# Patient Record
Sex: FEMALE | Race: WHITE | URBAN - METROPOLITAN AREA
[De-identification: names, ages, dates, MRNs, and addresses within clinical notes are randomized per-mention and may not be internally consistent; named-entity substitution may affect disease eponyms.]

---

## 2017-01-04 ENCOUNTER — TELEPHONE (OUTPATIENT)
Dept: NURSING | Facility: CLINIC | Age: 52
End: 2017-01-04

## 2017-01-04 DIAGNOSIS — E78.5 HYPERLIPIDEMIA LDL GOAL <100: Primary | ICD-10-CM

## 2017-01-04 RX ORDER — ATORVASTATIN CALCIUM 20 MG/1
20 TABLET, FILM COATED ORAL DAILY
Qty: 45 TABLET | Refills: 0 | Status: SHIPPED | OUTPATIENT
Start: 2017-01-04 | End: 2017-02-13

## 2017-01-04 NOTE — TELEPHONE ENCOUNTER
Called pt and LM informing Rx has been sent to her pharmacy and that it is for 20 mg of the Lipitor (Atorovastatin) so only needs to take 1 tablet, with 45 tabs/0rf (not two as she was doing with the 10 mg tablets) so she has enough so she doesn't run out before her appt. If has questions to call and speak to Triage. Closing encounter.

## 2017-01-04 NOTE — TELEPHONE ENCOUNTER
Pt calling, in November 2016 was changed from Zocor to Lipitor (Atorvastatin) and an Rx for 10 mg was sent but then after her results were back Violette recommending taking 20 mg instead, so pt was taking 2 tablets of the 10 mg and because of that she ran out of Rx about a wk ago and is just calling now. Called pharmacy and spoke to Pharmacist (Karina) who indicated it will get paid for (by insurance) on 1/18/17. Gave verbal to Karina to cancel/delete the remaining 3 month Rx and that we would send a new Rx. Last annual 1/13/16, pt stated she would make appt for February. Routing to Violette, ok to send 45 tabs of Atorvastatin 20 mg so pt would only take 1 tab daily? Routing to Violette. Rx & pharmacy pended.

## 2017-01-06 ENCOUNTER — TELEPHONE (OUTPATIENT)
Dept: PEDIATRICS | Facility: CLINIC | Age: 52
End: 2017-01-06

## 2017-01-06 NOTE — LETTER
[facility] [place]  [address]  [address]  [place] [address] [phone]        March 24, 2017  Modesta Bravo  [address]  [place] [address]    Dear Modesta,    I care about your health and have reviewed your health plan. I have reviewed your medical conditions, medication list, and lab results and am making recommendations based on this review, to better manage your health.    You are in particular need of attention regarding:  -Colon Cancer Screening    I am recommending that you:  -schedule a COLONOSCOPY to look for colon cancer (due every 10 years or 5 years in higher risk situations.)   Colon cancer is now the second leading cause of death in the [place] for both men and women and there are over 130,000 new cases and 50,000 deaths per year from colon cancer.  Colonoscopies can prevent 90-95% of these deaths.  Problem lesions can be removed before they ever become cancer.  This test is not only looking for cancer, but also getting rid of precancerious lesions.  If you do not wish to do a colonoscopy or cannot afford to do one, at this time, there is another option. It is called a FIT test or Fecal Immunochemical Occult Blood Test (take home stool sample kit).  It does not replace the colonoscopy for colorectal cancer screening, but it can detect hidden bleeding in the lower colon.  It does need to be repeated every year and if a positive result is obtained, you would be referred for a colonoscopy.  If you have completed either one of these tests at another facility, please have the records sent to our clinic so that we can best coordinate your care.    Here is a list of Health Maintenance topics that are due now or due soon:  Health Maintenance Due   Topic Date Due     HEPATITIS C SCREENING  11/09/1983     COLON CANCER SCREEN (SYSTEM ASSIGNED)  11/09/2015         Please call us at [phone] (or use [employer]) to address the above  recommendations.    Thank you for trusting [facility] and we appreciate the opportunity to serve you.  We look forward to supporting your healthcare needs in the future.    Healthy Regards,    Dr. Hugo Care Team

## 2017-01-06 NOTE — TELEPHONE ENCOUNTER
Panel Management Review      Patient has the following on her problem list: None      Composite cancer screening  Chart review shows that this patient is due/due soon for the following Colonoscopy  Summary:    Patient is due/failing the following:   COLONOSCOPY    Action needed:   Patient needs office visit for colonoscopy.    Type of outreach:    Sent [employer] message.    Questions for provider review:    None                                                                                                                                    Katiuska Blackwood MA       Chart routed to self .

## 2017-01-06 NOTE — LETTER
[facility]  [address]  [address]  [place] [address]  Phone: [phone]  Fax: [phone]  May 18, 2017      Modesta Bravo  [address] [place] [address]      Dear Modesta,    We care about your health and have reviewed your health plan including your medical conditions, medications, and lab results.  Based on this review, it is recommended that you follow up regarding the following health topic(s):  -Colon Cancer Screening    We recommend you take the following action(s):  -schedule a COLONOSCOPY to look for colon cancer (due every 10 years or 5 years in higher risk situations.)  Colonoscopies can prevent 90-95% of colon cancer deaths.  Problem lesions can be removed before they ever become cancer.  If you do not wish to do a colonoscopy or cannot afford to do one at this time, there is another option called a Fecal Immunochemical Occult Blood Test (FIT) a take home stool sample kit.  It does not replace the colonoscopy for colorectal cancer screening, but it can detect hidden bleeding in the lower colon.  It does need to be repeated every year and if a positive result is obtained, you would be referred for a colonoscopy.  If you have completed either one of these tests at another facility, please have the records sent to our clinic for our records.     Please call us at the [facility] [phone] (or use [employer]) to address the above recommendations.     Thank you for trusting [facility] and we appreciate the opportunity to serve you.  We look forward to supporting your healthcare needs in the future.    Healthy Regards,    Your Health Care Team  [facility]

## 2017-02-13 ENCOUNTER — RADIANT APPOINTMENT (OUTPATIENT)
Dept: MAMMOGRAPHY | Facility: CLINIC | Age: 52
End: 2017-02-13
Payer: COMMERCIAL

## 2017-02-13 ENCOUNTER — OFFICE VISIT (OUTPATIENT)
Dept: OBGYN | Facility: CLINIC | Age: 52
End: 2017-02-13
Payer: COMMERCIAL

## 2017-02-13 VITALS
SYSTOLIC BLOOD PRESSURE: 116 MMHG | BODY MASS INDEX: 32.8 KG/M2 | DIASTOLIC BLOOD PRESSURE: 72 MMHG | WEIGHT: 209 LBS | HEIGHT: 67 IN | HEART RATE: 70 BPM

## 2017-02-13 DIAGNOSIS — E78.5 HYPERLIPIDEMIA, UNSPECIFIED HYPERLIPIDEMIA TYPE: ICD-10-CM

## 2017-02-13 DIAGNOSIS — I10 ESSENTIAL HYPERTENSION WITH GOAL BLOOD PRESSURE LESS THAN 140/90: ICD-10-CM

## 2017-02-13 DIAGNOSIS — Z01.419 ENCOUNTER FOR GYNECOLOGICAL EXAMINATION WITHOUT ABNORMAL FINDING: Primary | ICD-10-CM

## 2017-02-13 DIAGNOSIS — Z12.31 VISIT FOR SCREENING MAMMOGRAM: ICD-10-CM

## 2017-02-13 DIAGNOSIS — Z12.11 COLON CANCER SCREENING: ICD-10-CM

## 2017-02-13 DIAGNOSIS — F41.9 ANXIETY: ICD-10-CM

## 2017-02-13 DIAGNOSIS — E78.5 HYPERLIPIDEMIA LDL GOAL <100: ICD-10-CM

## 2017-02-13 LAB
ALBUMIN SERPL-MCNC: 4 G/DL (ref 3.4–5)
ALP SERPL-CCNC: 66 U/L (ref 40–150)
ALT SERPL W P-5'-P-CCNC: 72 U/L (ref 0–50)
ANION GAP SERPL CALCULATED.3IONS-SCNC: 9 MMOL/L (ref 3–14)
AST SERPL W P-5'-P-CCNC: 42 U/L (ref 0–45)
BILIRUB SERPL-MCNC: 0.5 MG/DL (ref 0.2–1.3)
BUN SERPL-MCNC: 12 MG/DL (ref 7–30)
CALCIUM SERPL-MCNC: 9.3 MG/DL (ref 8.5–10.1)
CHLORIDE SERPL-SCNC: 102 MMOL/L (ref 94–109)
CHOLEST SERPL-MCNC: 155 MG/DL
CO2 SERPL-SCNC: 26 MMOL/L (ref 20–32)
CREAT SERPL-MCNC: 0.8 MG/DL (ref 0.52–1.04)
GFR SERPL CREATININE-BSD FRML MDRD: 76 ML/MIN/1.7M2
GLUCOSE SERPL-MCNC: 112 MG/DL (ref 70–99)
HDLC SERPL-MCNC: 45 MG/DL
LDLC SERPL CALC-MCNC: 82 MG/DL
NONHDLC SERPL-MCNC: 110 MG/DL
POTASSIUM SERPL-SCNC: 3.4 MMOL/L (ref 3.4–5.3)
PROT SERPL-MCNC: 7.9 G/DL (ref 6.8–8.8)
SODIUM SERPL-SCNC: 137 MMOL/L (ref 133–144)
TRIGL SERPL-MCNC: 142 MG/DL

## 2017-02-13 PROCEDURE — 87624 HPV HI-RISK TYP POOLED RSLT: CPT | Performed by: PHYSICIAN ASSISTANT

## 2017-02-13 PROCEDURE — 80053 COMPREHEN METABOLIC PANEL: CPT | Performed by: PHYSICIAN ASSISTANT

## 2017-02-13 PROCEDURE — G0202 SCR MAMMO BI INCL CAD: HCPCS | Mod: TC

## 2017-02-13 PROCEDURE — G0145 SCR C/V CYTO,THINLAYER,RESCR: HCPCS | Performed by: PHYSICIAN ASSISTANT

## 2017-02-13 PROCEDURE — 36415 COLL VENOUS BLD VENIPUNCTURE: CPT | Performed by: PHYSICIAN ASSISTANT

## 2017-02-13 PROCEDURE — 99396 PREV VISIT EST AGE 40-64: CPT | Performed by: PHYSICIAN ASSISTANT

## 2017-02-13 PROCEDURE — 80061 LIPID PANEL: CPT | Performed by: PHYSICIAN ASSISTANT

## 2017-02-13 RX ORDER — ATORVASTATIN CALCIUM 20 MG/1
20 TABLET, FILM COATED ORAL DAILY
Qty: 90 TABLET | Refills: 3 | Status: SHIPPED | OUTPATIENT
Start: 2017-02-13 | End: 2018-02-09

## 2017-02-13 RX ORDER — LISINOPRIL AND HYDROCHLOROTHIAZIDE 20; 25 MG/1; MG/1
1 TABLET ORAL DAILY
Qty: 90 TABLET | Refills: 3 | Status: CANCELLED | OUTPATIENT
Start: 2017-02-13

## 2017-02-13 ASSESSMENT — ANXIETY QUESTIONNAIRES
5. BEING SO RESTLESS THAT IT IS HARD TO SIT STILL: SEVERAL DAYS
1. FEELING NERVOUS, ANXIOUS, OR ON EDGE: SEVERAL DAYS
3. WORRYING TOO MUCH ABOUT DIFFERENT THINGS: SEVERAL DAYS
6. BECOMING EASILY ANNOYED OR IRRITABLE: SEVERAL DAYS
GAD7 TOTAL SCORE: 6
7. FEELING AFRAID AS IF SOMETHING AWFUL MIGHT HAPPEN: NOT AT ALL
2. NOT BEING ABLE TO STOP OR CONTROL WORRYING: SEVERAL DAYS
IF YOU CHECKED OFF ANY PROBLEMS ON THIS QUESTIONNAIRE, HOW DIFFICULT HAVE THESE PROBLEMS MADE IT FOR YOU TO DO YOUR WORK, TAKE CARE OF THINGS AT HOME, OR GET ALONG WITH OTHER PEOPLE: NOT DIFFICULT AT ALL

## 2017-02-13 ASSESSMENT — PATIENT HEALTH QUESTIONNAIRE - PHQ9: 5. POOR APPETITE OR OVEREATING: SEVERAL DAYS

## 2017-02-13 NOTE — PROGRESS NOTES
Modesta is a 51 year old  female who presents for annual exam.     Besides routine health maintenance, she has no other health concerns today .    HPI:  The patient's PCP is Toro Hugo MD, MD.      Doing well overall. Main concern is still can't loose weight. Has decided to see a  at the [facility] as next step.     Still having monthly menses and they remain very heavy. We have discussed ablation in the past which she still may consider.   This past month, her period was lighter, however so she is going to see if this is the new trend.   She is having occ hot flushes but not bad. +vaginal dryness, ok with OTC lube, decreased libido.    Would like refill of sertraline. Moods are ok overall, still feels more irritable than use to but is ok with same dose of medication.   Lipitor is going much better than simvastatin, takes 20mg daily. LFTs were up last year prior to the med change so will recheck today.   No muscle aches or fatigue with lipitor.   Blood pressure has been excellent, wonders about a decreased dose. No dizziness.     Being worked up for chronic congestion and some coughing, occ wheezing. Pulmonary function testing was normal. Thinking it may be allergies.     Had mammogram prior to visit.   Agrees to colonoscopy at [facility], did not get it done last year but agrees to do it now.   Pap/HPV due today.       GYNECOLOGIC HISTORY:    Patient's last menstrual period was 2017 (approximate).  Her current contraception method is: vasectomy.  She  reports that she has never smoked. She has never used smokeless tobacco.    Patient is sexually active.  STD testing offered?  Declined  Last PHQ-9 score on record =   PHQ-9 SCORE 2017   Total Score 5     Last GAD7 score on record =   SARAI-7 SCORE 2017   Total Score 6     Alcohol Score = 3    HEALTH MAINTENANCE:  Cholesterol: 16   Total= 239, Triglycerides=158, HDL=51, PSR=548  Last Mammo: one year ago, Result: normal, Next  Mammo: today   Pap: 12 wnl, HPV-  Colonoscopy:  Never, Result: not applicable, Next Colonoscopy: DUE  Dexa:  Never    Health maintenance updated:  yes    HISTORY:  Obstetric History       T2      TAB0   SAB0   E0   M0   L2       # Outcome Date GA Lbr Bandar/2nd Weight Sex Delivery Anes PTL Lv   2 Term            1 Term                   Patient Active Problem List   Diagnosis     Hyperlipidemia     Hyperparathyroidism (H)     Hypertension goal BP (blood pressure) < 140/90     Anxiety     Cough     Past Surgical History   Procedure Laterality Date     No history of surgery        Social History   Substance Use Topics     Smoking status: Never Smoker     Smokeless tobacco: Never Used     Alcohol use 0.0 oz/week     0 Standard drinks or equivalent per week      Problem (# of Occurrences) Relation (Name,Age of Onset)    Breast Cancer (3) Sister (47): pt is BRCA neg, Maternal Grandmother, Sister    DIABETES (2) Mother, Father    Hyperlipidemia (2) Mother, Father    Hypertension (2) Mother, Father: chf 73            Current Outpatient Prescriptions   Medication Sig     BABY ASPIRIN PO      atorvastatin (LIPITOR) 20 MG tablet Take 1 tablet (20 mg) by mouth daily     sertraline (ZOLOFT) 50 MG tablet Take 1 tablet (50 mg) by mouth daily     lisinopril-hydrochlorothiazide (PRINZIDE,ZESTORETIC) 20-25 MG per tablet Take 1 tablet by mouth daily     albuterol (PROAIR HFA, PROVENTIL HFA, VENTOLIN HFA) 108 (90 BASE) MCG/ACT inhaler Inhale 2 puffs into the lungs every 6 hours as needed for shortness of breath / dyspnea or wheezing     Vitamin D, Cholecalciferol, 1000 UNITS TABS Take 2,000 Units by mouth daily     [DISCONTINUED] atorvastatin (LIPITOR) 20 MG tablet Take 1 tablet (20 mg) by mouth daily     beclomethasone (QVAR) 80 MCG/ACT Inhaler Inhale 2 puffs into the lungs 2 times daily (Patient not taking: Reported on 2017)     [DISCONTINUED] sertraline (ZOLOFT) 50 MG tablet Take 1 tablet (50 mg) by mouth  "daily     No current facility-administered medications for this visit.      No Known Allergies    Past medical, surgical, social and family histories were reviewed and updated in EPIC.    ROS:   12 point review of systems negative other than symptoms noted below.  Constitutional: Fatigue and Weight Gain  Head: Nasal Congestion  Cardiovascular: Chest Pain  Respiratory: Cough and Wheezing  Genitourinary: Cramps, Heavy Bleeding with Period, Hot Flashes, Night Sweats, Painful [place] and Vaginal Dryness  Skin: Skin Dryness  Neurologic: Headaches  Endocrine: Decreased Libido  Psychiatric: Anxiety, Difficulty Sleeping and [place]    EXAM:  /72  Pulse 70  Ht 5' 6.5\" (1.689 m)  Wt 209 lb (94.8 kg)  LMP 01/26/2017 (Approximate)  BMI 33.23 kg/m2   BMI: Body mass index is 33.23 kg/(m^2).    PHYSICAL EXAM:  Constitutional:  Appearance: Well nourished, well developed, alert, in no acute distress  Neck:  Lymph Nodes:  No lymphadenopathy present    Thyroid:  Gland size normal, nontender, no nodules or masses present  on palpation  Chest:  Respiratory Effort:  Breathing unlabored  Cardiovascular:    Heart: Auscultation:  Regular rate, normal rhythm, no murmurs present  Breasts: Inspection of Breasts:  No lymphadenopathy present    Palpation of Breasts and Axillae:  No masses present on palpation, no  breast tenderness    Axillary Lymph Nodes:  No lymphadenopathy present  Gastrointestinal:   Abdominal Examination:  Abdomen nontender to palpation, tone normal without rigidity or guarding, no masses present, umbilicus without lesions   Liver and Spleen:  No hepatomegaly present, liver nontender to palpation    Hernias:  No hernias present  Lymphatic: Lymph Nodes:  No other lymphadenopathy present  Skin:  General Inspection:  No rashes present, no lesions present, no areas of  discoloration    Genitalia and Groin:  No rashes present, no lesions present, no areas of  discoloration, no masses " present  Neurologic/Psychiatric:    Mental Status:  Oriented X3     Pelvic Exam:  External Genitalia:     Normal appearance for age, no discharge present, no tenderness present, no inflammatory lesions present, color normal  Vagina:     Normal vaginal vault without central or paravaginal defects, ATROPHIC  Bladder:     Nontender to palpation  Urethra:   Urethral Body:  Urethra palpation normal, urethra structural support normal   Urethral Meatus:  No erythema or lesions present  Cervix:     Appearance healthy, no lesions present, nontender to palpation, no bleeding present  Uterus:     Nontender to palpation, no masses present, position anteflexed, mobility: normal  Adnexa:     No adnexal tenderness present, no adnexal masses present  Perineum:     Perineum within normal limits, no evidence of trauma, no rashes or skin lesions present  Inguinal Lymph Nodes:     No lymphadenopathy present      COUNSELING:   Reviewed preventive health counseling, as reflected in patient instructions  Special attention given to:        (Marie)menopause management    BMI: Body mass index is 33.23 kg/(m^2).  Weight management plan: Discussed healthy diet and exercise guidelines and patient will follow up in 12 months in clinic to re-evaluate.    ASSESSMENT:  51 year old female with satisfactory annual exam.    ICD-10-CM    1. Encounter for gynecological examination without abnormal finding Z01.419 Pap imaged thin layer screen with HPV - recommended age 30 - 65     HPV High Risk Types DNA Cervical   2. Hyperlipidemia LDL goal <100 E78.5 Comprehensive metabolic panel     Lipid panel reflex to direct LDL     atorvastatin (LIPITOR) 20 MG tablet   3. Anxiety F41.9 sertraline (ZOLOFT) 50 MG tablet   4. Essential hypertension with goal blood pressure less than 140/90 I10    5. Colon cancer screening Z12.11 GASTROENTEROLOGY ADULT REF PROCEDURE ONLY   6. Hypertension goal BP (blood pressure) < 140/90 I10    7. Hyperlipidemia, unspecified  hyperlipidemia type E78.5        PLAN:  Follow up with your primary care provider for your other medical problems.  Risks, benefits, and side effects of medications reviewed.  Increase physical activity and exercise.  PHQ-9/SARAI-7 scores were discussed.  Alcohol score discussed.  Lab results will be called to the patient.  Usual safety and preventative measures counseling done.  BMI >25  Weight loss encouraged.  Discussed Osteoporosis screening as well as calcium and Vitamin D recommendations.  Pt will my chart message me with 2 weeks of blood pressure results after her trip to [place]. If they remain in the 120/70s range, recommend she continue with current dose of BP medication. If the are lower and/or feels dizzy we will decrease dose.   Agrees to schedule colonoscopy, orders sent.   Continue with sertraline and lipitor at current doses.   Discussed menses and vania-menopause. She will RTC for ablation consult if her menses continue to be heavy. Not interested in Mirena.     Violette Fletcher PA-C

## 2017-02-13 NOTE — MR AVS SNAPSHOT
After Visit Summary   2/13/2017    Modesta Bravo    MRN: [id number]           Patient Information     Date Of Birth          1965        Visit Information        Provider Department      2/13/2017 9:00 AM Violette Fletcher PA-C [facility]        Today's Diagnoses     Encounter for gynecological examination without abnormal finding    -  1    Hyperlipidemia LDL goal <100        Anxiety        Essential hypertension with goal blood pressure less than 140/90        Colon cancer screening        Hypertension goal BP (blood pressure) < 140/90        Hyperlipidemia, unspecified hyperlipidemia type           Follow-ups after your visit        Additional Services     GASTROENTEROLOGY ADULT REF PROCEDURE ONLY       Last Lab Result: Creatinine (mg/dL)       Date                     Value                 11/11/2016               0.75             ----------  Body mass index is 33.23 kg/(m^2).     Needed:  No  Language:  English    Patient will be contacted to schedule procedure.     Please be aware that coverage of these services is subject to the terms and limitations of your health insurance plan.  Call member services at your health plan with any benefit or coverage questions.  Any procedures must be performed at a [place] facility OR coordinated by your clinic's referral office.    Please bring the following with you to your appointment:    (1) Any X-Rays, CTs or MRIs which have been performed.  Contact the facility where they were done to arrange for  prior to your scheduled appointment.    (2) List of current medications   (3) This referral request   (4) Any documents/labs given to you for this referral                  Who to contact     If you have questions or need follow up information about today's clinic visit or your schedule please contact [facility] directly at [phone].  Normal or non-critical lab and imaging results  "will be communicated to you by MyChart, letter or phone within 4 business days after the clinic has received the results. If you do not hear from us within 7 days, please contact the clinic through [employer] or phone. If you have a critical or abnormal lab result, we will notify you by phone as soon as possible.  Submit refill requests through [employer] or call your pharmacy and they will forward the refill request to us. Please allow 3 business days for your refill to be completed.          Additional Information About Your Visit        [employer] Information     [employer] gives you secure access to your electronic health record. If you see a primary care provider, you can also send messages to your care team and make appointments. If you have questions, please call your primary care clinic.  If you do not have a primary care provider, please call [phone] and they will assist you.        Care EveryWhere ID     This is your Care EveryWhere ID. This could be used by other organizations to access your [facility] medical records  [id number]        Your Vitals Were     Pulse Height Last Period BMI (Body Mass Index)          70 5' 6.5\" (1.689 m) 01/26/2017 (Approximate) 33.23 kg/m2         Blood Pressure from Last 3 Encounters:   02/13/17 116/72   11/11/16 (!) 150/92   08/29/16 (!) 152/103    Weight from Last 3 Encounters:   02/13/17 209 lb (94.8 kg)   11/11/16 209 lb (94.8 kg)   08/29/16 207 lb (93.9 kg)              We Performed the Following     Comprehensive metabolic panel     GASTROENTEROLOGY ADULT REF PROCEDURE ONLY     HPV High Risk Types DNA Cervical     Lipid panel reflex to direct LDL     Pap imaged thin layer screen with HPV - recommended age 30 - 65          Where to get your medicines      These medications were sent to [id number] - [place] - [address]  [address] [place] [address]     Phone:  [phone]     atorvastatin 20 MG tablet    " sertraline 50 MG tablet          Primary Care Provider Office Phone # Fax #    Toro Hugo [phone]       [facility] [place] [address]        Thank you!     Thank you for choosing [facility]  for your care. Our goal is always to provide you with excellent care. Hearing back from our patients is one way we can continue to improve our services. Please take a few minutes to complete the written survey that you may receive in the mail after your visit with us. Thank you!             Your Updated Medication List - Protect others around you: Learn how to safely use, store and throw away your medicines at www.disposemymeds.org.          This list is accurate as of: 2/13/17  9:58 AM.  Always use your most recent med list.                   Brand Name Dispense Instructions for use    albuterol 108 (90 BASE) MCG/ACT Inhaler    PROAIR HFA/PROVENTIL HFA/VENTOLIN HFA    3 Inhaler    Inhale 2 puffs into the lungs every 6 hours as needed for shortness of breath / dyspnea or wheezing       atorvastatin 20 MG tablet    LIPITOR    90 tablet    Take 1 tablet (20 mg) by mouth daily       BABY ASPIRIN PO          beclomethasone 80 MCG/ACT Inhaler    QVAR    3 Inhaler    Inhale 2 puffs into the lungs 2 times daily       lisinopril-hydrochlorothiazide 20-25 MG per tablet    PRINZIDE/ZESTORETIC    90 tablet    Take 1 tablet by mouth daily       sertraline 50 MG tablet    ZOLOFT    90 tablet    Take 1 tablet (50 mg) by mouth daily       Vitamin D (Cholecalciferol) 1000 UNITS Tabs      Take 2,000 Units by mouth daily

## 2017-02-14 ASSESSMENT — ANXIETY QUESTIONNAIRES: GAD7 TOTAL SCORE: 6

## 2017-02-14 ASSESSMENT — PATIENT HEALTH QUESTIONNAIRE - PHQ9: SUM OF ALL RESPONSES TO PHQ QUESTIONS 1-9: 5

## 2017-02-15 LAB
COPATH REPORT: NORMAL
PAP: NORMAL

## 2017-02-17 LAB
FINAL DIAGNOSIS: NORMAL
HPV HR 12 DNA CVX QL NAA+PROBE: NEGATIVE
HPV16 DNA SPEC QL NAA+PROBE: NEGATIVE
HPV18 DNA SPEC QL NAA+PROBE: NEGATIVE
SPECIMEN DESCRIPTION: NORMAL

## 2017-03-01 DIAGNOSIS — R05.9 COUGH: ICD-10-CM

## 2017-03-02 RX ORDER — BENZONATATE 100 MG/1
CAPSULE ORAL
Qty: 42 CAPSULE | Refills: 0 | OUTPATIENT
Start: 2017-03-02

## 2017-04-21 ENCOUNTER — OFFICE VISIT (OUTPATIENT)
Dept: URGENT CARE | Facility: URGENT CARE | Age: 52
End: 2017-04-21
Payer: COMMERCIAL

## 2017-04-21 VITALS
OXYGEN SATURATION: 96 % | SYSTOLIC BLOOD PRESSURE: 120 MMHG | WEIGHT: 213 LBS | TEMPERATURE: 99.1 F | RESPIRATION RATE: 22 BRPM | BODY MASS INDEX: 33.86 KG/M2 | HEART RATE: 90 BPM | DIASTOLIC BLOOD PRESSURE: 80 MMHG

## 2017-04-21 DIAGNOSIS — R05.9 COUGH: ICD-10-CM

## 2017-04-21 DIAGNOSIS — J45.901 ASTHMA EXACERBATION: Primary | ICD-10-CM

## 2017-04-21 PROCEDURE — 94640 AIRWAY INHALATION TREATMENT: CPT | Performed by: FAMILY MEDICINE

## 2017-04-21 PROCEDURE — 99214 OFFICE O/P EST MOD 30 MIN: CPT | Mod: 25 | Performed by: FAMILY MEDICINE

## 2017-04-21 RX ORDER — ALBUTEROL SULFATE 90 UG/1
2 AEROSOL, METERED RESPIRATORY (INHALATION) EVERY 4 HOURS PRN
Qty: 1 INHALER | Refills: 1 | Status: SHIPPED | OUTPATIENT
Start: 2017-04-21 | End: 2018-03-27

## 2017-04-21 RX ORDER — ALBUTEROL SULFATE 0.83 MG/ML
1 SOLUTION RESPIRATORY (INHALATION) EVERY 4 HOURS PRN
Status: ON HOLD | COMMUNITY
Start: 2017-04-21 | End: 2017-07-28

## 2017-04-21 RX ORDER — BENZONATATE 100 MG/1
200 CAPSULE ORAL 3 TIMES DAILY PRN
Qty: 42 CAPSULE | Refills: 3 | Status: SHIPPED | OUTPATIENT
Start: 2017-04-21 | End: 2017-05-10

## 2017-04-21 NOTE — PROGRESS NOTES
SUBJECTIVE:   Modesta Bravo is a 51 year old female with a h/o cough variant asthma.  She is presenting with a chief complaint of cough (sometimes productive frothy phlegm, worse at night), wheezing.  No fevers.    Onset of symptoms was one week ago.  Course of illness is worsening. .    Severity moderate. .    Current and Associated symptoms: as listed above.   Treatment measures tried include Albuterol MDI (BID-TID). And Tessalon Perles.   Predisposing factors include h/o cough-variant asthma. .    Past Medical History:   Diagnosis Date     Anxiety      Cough variant asthma      Family history of breast cancer     Pt is BrCa negative     Hyperlipidaemia      Hyperparathyroidism (H)     seen by Dr. Bosch     Hypertension      Menorrhagia      Vitamin deficiency      Current Outpatient Prescriptions   Medication Sig Dispense Refill     BABY ASPIRIN PO        atorvastatin (LIPITOR) 20 MG tablet Take 1 tablet (20 mg) by mouth daily 90 tablet 3     sertraline (ZOLOFT) 50 MG tablet Take 1 tablet (50 mg) by mouth daily 90 tablet 3     lisinopril-hydrochlorothiazide (PRINZIDE,ZESTORETIC) 20-25 MG per tablet Take 1 tablet by mouth daily 90 tablet 3     albuterol (PROAIR HFA, PROVENTIL HFA, VENTOLIN HFA) 108 (90 BASE) MCG/ACT inhaler Inhale 2 puffs into the lungs every 6 hours as needed for shortness of breath / dyspnea or wheezing 3 Inhaler 1     beclomethasone (QVAR) 80 MCG/ACT Inhaler Inhale 2 puffs into the lungs 2 times daily 3 Inhaler 3     Vitamin D, Cholecalciferol, 1000 UNITS TABS Take 2,000 Units by mouth daily       Social History   Substance Use Topics     Smoking status: Never Smoker     Smokeless tobacco: Never Used     Alcohol use 0.0 oz/week     0 Standard drinks or equivalent per week       ROS:  Negative except for above history of present illness.      OBJECTIVE  :/80 (BP Location: Right arm, Patient Position: Chair, Cuff Size: Adult Large)  Pulse 90  Temp 99.1  F (37.3  C) (Tympanic)  Resp  22  Wt 213 lb (96.6 kg)  SpO2 96%  BMI 33.86 kg/m2  GENERAL APPEARANCE: healthy, alert and audible wheezing.  Patient can speak in full sentences.    RESP: expiratory wheezes bilateral and throughout and inspiratory wheezes bilateral and throughout  CV: regular rates and rhythm, normal S1 S2, no murmur noted    ASSESSMENT:  Cough  Asthma exacerbation    PLAN:  Patient doesn't want a Rx for Prednisone.  In the past, patient gained 20 pounds after taking prednisone and has not been able to lose this weight.   Rx:  ProAir, Tessalon Perles  follow up with the primary care provider if not better in 3-4 days.   Go to the emergency room if you develop worsening shortness of breath.   Patient received an Albuterol neb treatment during this clinic encounter.  She felt better after the albuterol neb treatment.      See orders in Epic    Mayank Wan MD

## 2017-04-21 NOTE — MR AVS SNAPSHOT
After Visit Summary   4/21/2017    Modesta Bravo    MRN: [id number]           Patient Information     Date Of Birth          1965        Visit Information        Provider Department      4/21/2017 2:50 PM Mayank Wan MD [facility] [place] [facility]        Today's Diagnoses     Asthma exacerbation    -  1    Cough          Care Instructions    Go to the emergency room if there is worsening shortness of breath.     follow up with the primary care provider if not better in 3-4 days.         Follow-ups after your visit        Who to contact     If you have questions or need follow up information about today's clinic visit or your schedule please contact [facility] directly at [phone].  Normal or non-critical lab and imaging results will be communicated to you by quitchenhart, letter or phone within 4 business days after the clinic has received the results. If you do not hear from us within 7 days, please contact the clinic through e27t or phone. If you have a critical or abnormal lab result, we will notify you by phone as soon as possible.  Submit refill requests through [employer] or call your pharmacy and they will forward the refill request to us. Please allow 3 business days for your refill to be completed.          Additional Information About Your Visit        MyChart Information     [employer] gives you secure access to your electronic health record. If you see a primary care provider, you can also send messages to your care team and make appointments. If you have questions, please call your primary care clinic.  If you do not have a primary care provider, please call [phone] and they will assist you.        Care EveryWhere ID     This is your Care EveryWhere ID. This could be used by other organizations to access your [place] medical records  [id number]        Your Vitals Were     Pulse Temperature Respirations Pulse Oximetry BMI (Body Mass Index)       90 99.1  F (37.3   C) (Tympanic) 22 96% 33.86 kg/m2        Blood Pressure from Last 3 Encounters:   04/21/17 120/80   02/13/17 116/72   11/11/16 (!) 150/92    Weight from Last 3 Encounters:   04/21/17 213 lb (96.6 kg)   02/13/17 209 lb (94.8 kg)   11/11/16 209 lb (94.8 kg)              We Performed the Following     INHALATION/NEBULIZER TREATMENT, INITIAL          Today's Medication Changes          These changes are accurate as of: 4/21/17  4:24 PM.  If you have any questions, ask your nurse or doctor.               Start taking these medicines.        Dose/Directions    benzonatate 100 MG capsule   Commonly known as:  TESSALON   Used for:  Cough   Started by:  Mayank Wan MD        Dose:  200 mg   Take 2 capsules (200 mg) by mouth 3 times daily as needed   Quantity:  42 capsule   Refills:  3         These medicines have changed or have updated prescriptions.        Dose/Directions    * albuterol 108 (90 BASE) MCG/ACT Inhaler   Commonly known as:  PROAIR HFA/PROVENTIL HFA/VENTOLIN HFA   This may have changed:  Another medication with the same name was added. Make sure you understand how and when to take each.   Used for:  Acute bronchospasm   Changed by:  Moses Sanders MD        Dose:  2 puff   Inhale 2 puffs into the lungs every 6 hours as needed for shortness of breath / dyspnea or wheezing   Quantity:  3 Inhaler   Refills:  1       * albuterol 108 (90 BASE) MCG/ACT Inhaler   Commonly known as:  PROAIR HFA/PROVENTIL HFA/VENTOLIN HFA   This may have changed:  You were already taking a medication with the same name, and this prescription was added. Make sure you understand how and when to take each.   Used for:  Asthma exacerbation, Cough   Changed by:  Mayank Wan MD        Dose:  2 puff   Inhale 2 puffs into the lungs every 4 hours as needed for shortness of breath / dyspnea or wheezing /chest tightness/cough   Quantity:  1 Inhaler   Refills:  1       * albuterol (2.5 MG/3ML) 0.083% neb solution   This may have  changed:  Another medication with the same name was added. Make sure you understand how and when to take each.   Used for:  Asthma exacerbation   Changed by:  Mayank Wan MD        Dose:  1 vial   Take 1 vial (2.5 mg) by nebulization once for 1 dose   Refills:  0       * Notice:  This list has 3 medication(s) that are the same as other medications prescribed for you. Read the directions carefully, and ask your doctor or other care provider to review them with you.         Where to get your medicines      Some of these will need a paper prescription and others can be bought over the counter.  Ask your nurse if you have questions.     Bring a paper prescription for each of these medications     albuterol 108 (90 BASE) MCG/ACT Inhaler    benzonatate 100 MG capsule                Primary Care Provider Office Phone # Fax #    Toro Hugo [phone]       [place] [address] [place] [address]        Thank you!     Thank you for choosing [facility]  for your care. Our goal is always to provide you with excellent care. Hearing back from our patients is one way we can continue to improve our services. Please take a few minutes to complete the written survey that you may receive in the mail after your visit with us. Thank you!             Your Updated Medication List - Protect others around you: Learn how to safely use, store and throw away your medicines at www.disposemymeds.org.          This list is accurate as of: 4/21/17  4:24 PM.  Always use your most recent med list.                   Brand Name Dispense Instructions for use    * albuterol 108 (90 BASE) MCG/ACT Inhaler    PROAIR HFA/PROVENTIL HFA/VENTOLIN HFA    3 Inhaler    Inhale 2 puffs into the lungs every 6 hours as needed for shortness of breath / dyspnea or wheezing       * albuterol 108 (90 BASE) MCG/ACT Inhaler    PROAIR HFA/PROVENTIL HFA/VENTOLIN HFA    1 Inhaler    Inhale 2 puffs into  the lungs every 4 hours as needed for shortness of breath / dyspnea or wheezing /chest tightness/cough       * albuterol (2.5 MG/3ML) 0.083% neb solution      Take 1 vial (2.5 mg) by nebulization once for 1 dose       atorvastatin 20 MG tablet    LIPITOR    90 tablet    Take 1 tablet (20 mg) by mouth daily       BABY ASPIRIN PO          beclomethasone 80 MCG/ACT Inhaler    QVAR    3 Inhaler    Inhale 2 puffs into the lungs 2 times daily       benzonatate 100 MG capsule    TESSALON    42 capsule    Take 2 capsules (200 mg) by mouth 3 times daily as needed       lisinopril-hydrochlorothiazide 20-25 MG per tablet    PRINZIDE/ZESTORETIC    90 tablet    Take 1 tablet by mouth daily       sertraline 50 MG tablet    ZOLOFT    90 tablet    Take 1 tablet (50 mg) by mouth daily       Vitamin D (Cholecalciferol) 1000 UNITS Tabs      Take 2,000 Units by mouth daily       * Notice:  This list has 3 medication(s) that are the same as other medications prescribed for you. Read the directions carefully, and ask your doctor or other care provider to review them with you.

## 2017-04-21 NOTE — PATIENT INSTRUCTIONS
Go to the emergency room if there is worsening shortness of breath.     follow up with the primary care provider if not better in 3-4 days.

## 2017-05-10 ENCOUNTER — TELEPHONE (OUTPATIENT)
Dept: URGENT CARE | Facility: URGENT CARE | Age: 52
End: 2017-05-10

## 2017-05-10 DIAGNOSIS — R05.9 COUGH: ICD-10-CM

## 2017-05-10 RX ORDER — BENZONATATE 100 MG/1
200 CAPSULE ORAL 3 TIMES DAILY PRN
Qty: 42 CAPSULE | Refills: 3 | Status: SHIPPED | OUTPATIENT
Start: 2017-05-10 | End: 2017-08-03

## 2017-05-10 NOTE — TELEPHONE ENCOUNTER
Clinic Action Needed:None  Reason for Call:Patient calling requesting Tessalon prescription be sent to [facility]. Reporting she had lost original hand written Rx.  Reordered to [facility].  Routed to:Not Routed    Shonna Chung, RN  [facility]

## 2017-07-07 ENCOUNTER — OFFICE VISIT (OUTPATIENT)
Dept: OBGYN | Facility: CLINIC | Age: 52
End: 2017-07-07
Payer: COMMERCIAL

## 2017-07-07 ENCOUNTER — TELEPHONE (OUTPATIENT)
Dept: OBGYN | Facility: CLINIC | Age: 52
End: 2017-07-07

## 2017-07-07 ENCOUNTER — RADIANT APPOINTMENT (OUTPATIENT)
Dept: ULTRASOUND IMAGING | Facility: CLINIC | Age: 52
End: 2017-07-07
Payer: COMMERCIAL

## 2017-07-07 VITALS — DIASTOLIC BLOOD PRESSURE: 70 MMHG | SYSTOLIC BLOOD PRESSURE: 118 MMHG

## 2017-07-07 DIAGNOSIS — N92.0 MENORRHAGIA WITH REGULAR CYCLE: Primary | ICD-10-CM

## 2017-07-07 DIAGNOSIS — N92.0 MENORRHAGIA WITH REGULAR CYCLE: ICD-10-CM

## 2017-07-07 DIAGNOSIS — F41.9 ANXIETY: ICD-10-CM

## 2017-07-07 DIAGNOSIS — R37 SEXUAL DYSFUNCTION: ICD-10-CM

## 2017-07-07 PROCEDURE — 88305 TISSUE EXAM BY PATHOLOGIST: CPT | Performed by: OBSTETRICS & GYNECOLOGY

## 2017-07-07 PROCEDURE — 99214 OFFICE O/P EST MOD 30 MIN: CPT | Mod: 25 | Performed by: OBSTETRICS & GYNECOLOGY

## 2017-07-07 PROCEDURE — 58100 BIOPSY OF UTERUS LINING: CPT | Performed by: OBSTETRICS & GYNECOLOGY

## 2017-07-07 PROCEDURE — 58340 CATHETER FOR HYSTEROGRAPHY: CPT | Performed by: OBSTETRICS & GYNECOLOGY

## 2017-07-07 PROCEDURE — 76831 ECHO EXAM UTERUS: CPT | Performed by: OBSTETRICS & GYNECOLOGY

## 2017-07-07 RX ORDER — OXYCODONE HYDROCHLORIDE 5 MG/1
TABLET ORAL
Qty: 3 TABLET | Refills: 0 | Status: ON HOLD | OUTPATIENT
Start: 2017-07-07 | End: 2017-07-28

## 2017-07-07 RX ORDER — BUPROPION HYDROCHLORIDE 150 MG/1
150 TABLET ORAL EVERY MORNING
Qty: 30 TABLET | Refills: 0 | Status: SHIPPED | OUTPATIENT
Start: 2017-07-07 | End: 2017-08-29

## 2017-07-07 NOTE — TELEPHONE ENCOUNTER
ERIKAM to call re [employer] that is on the schedule as follows. Advised via VM that I am only here until 2:30 and if she gets msg after that time to call the  and ask to speak to Amber.    Patient surgery scheduled on 7/14/2017 at 3pm Check in 1:45pm  Location for surgery to performed:   In Clinic  Scheduled by Debbie/Amber 7/7/2017     Information Packet given :Yes: MAILED    CPT codes given: No            Consents signed? N/A  Rep Informed :N/A    PREOP DATE :  NA  In Epic :Yes    On Spreadsheet :Yes    On Calendar EB  :No    In [place] Calendar NIKO  :No    Assist NA   Assist in Epic NA  Assist Notified as needed :No     Debbie Wiley  Surgery Scheduler

## 2017-07-07 NOTE — PROGRESS NOTES
SUBJECTIVE:                                                   Modesta Bravo is a 51 year old female who presents to clinic today for the following health issue(s):  Patient presents with:  Other: Heavy menses, discuss ablation      Additional information:     HPI:  Continues to have menorrhagia with regular cycles. No DUB or pain.   Has menopausal symptoms of hot flashes and night sweats.   No missed menses.  Misses activities due to the heavy bleeding.   Has waited for 4 years due to proximity to menopause but bleeding continues.     Also has issues with decreased libido, vaginal dryness and anorgasmia.   On Zoloft for years. Has had decreased libido for years. Affecting marriage. Anorgasmia is new. Has used KY gel but doesn't feel it's enough.       Patient's last menstrual period was 2017 (exact date)..   Patient is sexually active, .  Using none for contraception.    reports that she has never smoked. She has never used smokeless tobacco.    STD testing offered?  Declined    Health maintenance updated:  yes    Today's PHQ-2 Score:   PHQ-2 (  [employer]) 10/5/2015   Q1: Little interest or pleasure in doing things 0   Q2: Feeling down, depressed or hopeless 0   PHQ-2 Score 0     Today's PHQ-9 Score:   PHQ-9 SCORE 2017   Total Score 5     Today's SARAI-7 Score:   SARAI-7 SCORE 2017   Total Score 6       Problem list and histories reviewed & adjusted, as indicated.  Additional history: as documented.    Patient Active Problem List   Diagnosis     Hyperlipidemia     Hyperparathyroidism (H)     Hypertension goal BP (blood pressure) < 140/90     Anxiety     Cough     Past Surgical History:   Procedure Laterality Date     NO HISTORY OF SURGERY        Social History   Substance Use Topics     Smoking status: Never Smoker     Smokeless tobacco: Never Used     Alcohol use 0.0 oz/week     0 Standard drinks or equivalent per week      Problem (# of Occurrences) Relation (Name,Age of Onset)    Breast  Cancer (3) Sister (47): pt is BRCA neg, Maternal Grandmother, Sister    DIABETES (2) Mother, Father    Hyperlipidemia (2) Mother, Father    Hypertension (2) Mother, Father: chf 73            Current Outpatient Prescriptions   Medication Sig     buPROPion (WELLBUTRIN XL) 150 MG 24 hr tablet Take 1 tablet (150 mg) by mouth every morning     benzonatate (TESSALON) 100 MG capsule Take 2 capsules (200 mg) by mouth 3 times daily as needed     albuterol (PROAIR HFA/PROVENTIL HFA/VENTOLIN HFA) 108 (90 BASE) MCG/ACT Inhaler Inhale 2 puffs into the lungs every 4 hours as needed for shortness of breath / dyspnea or wheezing /chest tightness/cough     albuterol (2.5 MG/3ML) 0.083% neb solution Take 1 vial (2.5 mg) by nebulization once for 1 dose     atorvastatin (LIPITOR) 20 MG tablet Take 1 tablet (20 mg) by mouth daily     sertraline (ZOLOFT) 50 MG tablet Take 1 tablet (50 mg) by mouth daily     lisinopril-hydrochlorothiazide (PRINZIDE,ZESTORETIC) 20-25 MG per tablet Take 1 tablet by mouth daily     albuterol (PROAIR HFA, PROVENTIL HFA, VENTOLIN HFA) 108 (90 BASE) MCG/ACT inhaler Inhale 2 puffs into the lungs every 6 hours as needed for shortness of breath / dyspnea or wheezing     Vitamin D, Cholecalciferol, 1000 UNITS TABS Take 2,000 Units by mouth daily     BABY ASPIRIN PO      No current facility-administered medications for this visit.      No Known Allergies    ROS:  12 point review of systems negative other than symptoms noted below.  Constitutional: Fatigue and Weight Gain  Head: Nasal Congestion  Respiratory: Cough and Wheezing  Genitourinary: Cramps, Heavy Bleeding with Period, Hot Flashes, Incontinence, Night Sweats, Painful [place], Significant PMS and Vaginal Dryness  Skin: Skin Dryness  Endocrine: Decreased Libido  Psychiatric: Difficulty Sleeping and Herzog    OBJECTIVE:     /70  LMP 07/07/2017 (Exact Date)  There is no height or weight on file to calculate BMI.    Exam:  Constitutional:  Appearance:  Well nourished, well developed alert, in no acute distress  Neurologic/Psychiatric:  Mental Status:  Oriented X3   Pelvic Exam:  External Genitalia:     Normal appearance for age, no discharge present, no tenderness present, no inflammatory lesions present, color normal  Vagina:     Normal vaginal vault without central or paravaginal defects, no discharge present, no inflammatory lesions present, no masses present  Bladder:     Nontender to palpation  Urethra:   Urethral Body:  Urethra palpation normal, urethra structural support normal   Urethral Meatus:  No erythema or lesions present  Cervix:     Appearance healthy, no lesions present, nontender to palpation, no bleeding present  Uterus:     Uterus: firm, normal sized and nontender, retroverted in position.   Adnexa:     No adnexal tenderness present, no adnexal masses present  Perineum:     Perineum within normal limits, no evidence of trauma, no rashes or skin lesions present  Anus:     Anus within normal limits, no hemorrhoids present  Inguinal Lymph Nodes:     No lymphadenopathy present  Pubic Hair:     Normal pubic hair distribution for age  Genitalia and Groin:     No rashes present, no lesions present, no areas of discoloration, no masses present     In-Clinic Test Results:  U/S shows 3-4 cm anterior fibroid close to the endometrium but not touching.     ASSESSMENT/PLAN:                                                        ICD-10-CM    1. Sexual dysfunction R37 buPROPion (WELLBUTRIN XL) 150 MG 24 hr tablet   2. Menorrhagia with regular cycle N92.0 US Transvaginal Non OB       Discussed options for menorrhagia. Can do OCPs, IUD, Ablation and LASH. Pt wants to avoid hormones due to FH of breast cancer even though she is BrCa neg.  Interested in ablation. Discussed workup and expected outcomes. Gave 15% rate of failure.  Will have EMB today with u/s.   Want ablation next week since she is starting her cycle now.     Will try adding Wellbutrin to zoloft for  a month. If helps with anorgasmia or decreased libido, will continue. She had SADD as well so this will help. If no effect, will get Testosterone IM and see what effects.   Gave samples of Astroglide and Uberlube.  Some of her sexual dysfunction may be from anorgasmia and then dryness.       25 minutes was spent face to face with the patient today discussing her history, diagnosis, and follow-up plan as noted above. Over 50% of the visit was spent in counseling and coordination of care.    Total Visit Time: 25 minutes.        Sergei Painting MD  [facility]    INDICATIONS:                                                    Is a pregnancy test required: No.  Was a consent obtained?  Yes    Having endometrial biopsy for menorrhagia    Today's PHQ-2 Score:   PHQ-2 ( 1999 [employer]) 10/5/2015   Q1: Little interest or pleasure in doing things 0   Q2: Feeling down, depressed or hopeless 0   PHQ-2 Score 0       PROCEDURE;                                                      A speculum was placed in the vagina and cervix prepped with betadine. A tenaculum was attached to the cervix. A small plastic 5 mm Pipelle syringe curette was inserted into the cervical canal. The uterus was sounded to 8 cm's. A vigorous four quadrant biopsy was performed, removing amount scant  of tissue. The speculum was removed. This tissue was placed in Formalin and sent to pathology.    The patient tolerated the procedure  well and she reported there was no cramping.      POST PROCEDURE;                                                      There  was no cramping at the time of discharge. She  tolerated the procedure well. There were no complications. Patient was discharged in stable condition.    Patient advised to call the clinic if severe pelvic pain, fever or heavy bleeding.    Sergei Painting MD

## 2017-07-07 NOTE — MR AVS SNAPSHOT
After Visit Summary   7/7/2017    Modesta Bravo    MRN: [id number]           Patient Information     Date Of Birth          1965        Visit Information        Provider Department      7/7/2017 10:20 AM Sergei Painting MD [facility]        Today's Diagnoses     Menorrhagia with regular cycle    -  1    Sexual dysfunction        Anxiety           Follow-ups after your visit        Your next 10 appointments already scheduled     Jul 19, 2017  9:00 AM CDT   (Arrive by 8:45 AM)   New Patient Visit with Carmella Maloney MD   [facility] ([facility])    [address] [place] [address] [phone]              Future tests that were ordered for you today     Open Future Orders        Priority Expected Expires Ordered    US Transvaginal Non OB Routine  7/8/2018 7/7/2017            Who to contact     If you have questions or need follow up information about today's clinic visit or your schedule please contact [facility] directly at [phone].  Normal or non-critical lab and imaging results will be communicated to you by MyChart, letter or phone within 4 business days after the clinic has received the results. If you do not hear from us within 7 days, please contact the clinic through Austh[employer] Software[employer] or phone. If you have a critical or abnormal lab result, we will notify you by phone as soon as possible.  Submit refill requests through [employer] or call your pharmacy and they will forward the refill request to us. Please allow 3 business days for your refill to be completed.          Additional Information About Your Visit        MyChart Information     [employer] gives you secure access to your electronic health record. If you see a primary care provider, you can also send messages to your care team and make appointments. If you have questions, please call your primary care clinic.  If you do not  have a primary care provider, please call [phone] and they will assist you.        Care EveryWhere ID     This is your Care EveryWhere ID. This could be used by other organizations to access your [place] medical records  [id number]        Your Vitals Were     Last Period                   07/07/2017 (Exact Date)            Blood Pressure from Last 3 Encounters:   07/07/17 118/70   04/21/17 120/80   02/13/17 116/72    Weight from Last 3 Encounters:   04/21/17 213 lb (96.6 kg)   02/13/17 209 lb (94.8 kg)   11/11/16 209 lb (94.8 kg)              We Performed the Following     ENDOMETRIAL BIOPSY W/O CERVICAL DILATION     Surgical pathology exam          Today's Medication Changes          These changes are accurate as of: 7/7/17 12:52 PM.  If you have any questions, ask your nurse or doctor.               Start taking these medicines.        Dose/Directions    buPROPion 150 MG 24 hr tablet   Commonly known as:  WELLBUTRIN XL   Used for:  Sexual dysfunction   Started by:  Sergei Painting MD        Dose:  150 mg   Take 1 tablet (150 mg) by mouth every morning   Quantity:  30 tablet   Refills:  0         Stop taking these medicines if you haven't already. Please contact your care team if you have questions.     beclomethasone 80 MCG/ACT Inhaler   Commonly known as:  QVAR   Stopped by:  Sergei Painting MD                Where to get your medicines      These medications were sent to [id number] - [place], [place] - [address]  AT [address]  [address] [place] [address]     Phone:  [phone]     buPROPion 150 MG 24 hr tablet                Primary Care Provider Office Phone # Fax #    Toro Hugo [phone]       [facility] [place] [address] [place] [address]        Equal Access to Services     ALEJO PANTOJA AH: Buddy Kenyon, armando larose, qaybta penelope pickett  dony postaan ah. So [facility] [phone].    ATENCIÓN: Si bhupinderla lillian, tiene a soler disposición servicios gratuitos de asistencia lingüística. Calvin al [phone].    We comply with applicable federal civil rights laws and [place] laws. We do not discriminate on the basis of race, color, national origin, age, disability sex, sexual orientation or gender identity.            Thank you!     Thank you for choosing [facility]  for your care. Our goal is always to provide you with excellent care. Hearing back from our patients is one way we can continue to improve our services. Please take a few minutes to complete the written survey that you may receive in the mail after your visit with us. Thank you!             Your Updated Medication List - Protect others around you: Learn how to safely use, store and throw away your medicines at www.disposemymeds.org.          This list is accurate as of: 7/7/17 12:52 PM.  Always use your most recent med list.                   Brand Name Dispense Instructions for use Diagnosis    * albuterol 108 (90 BASE) MCG/ACT Inhaler    PROAIR HFA/PROVENTIL HFA/VENTOLIN HFA    3 Inhaler    Inhale 2 puffs into the lungs every 6 hours as needed for shortness of breath / dyspnea or wheezing    Acute bronchospasm       * albuterol 108 (90 BASE) MCG/ACT Inhaler    PROAIR HFA/PROVENTIL HFA/VENTOLIN HFA    1 Inhaler    Inhale 2 puffs into the lungs every 4 hours as needed for shortness of breath / dyspnea or wheezing /chest tightness/cough    Asthma exacerbation, Cough       * albuterol (2.5 MG/3ML) 0.083% neb solution      Take 1 vial (2.5 mg) by nebulization once for 1 dose    Asthma exacerbation       atorvastatin 20 MG tablet    LIPITOR    90 tablet    Take 1 tablet (20 mg) by mouth daily    Hyperlipidemia LDL goal <100       BABY ASPIRIN PO       Essential hypertension with goal blood pressure less than 140/90       benzonatate 100 MG capsule    TESSALON    42 capsule     Take 2 capsules (200 mg) by mouth 3 times daily as needed    Cough       buPROPion 150 MG 24 hr tablet    WELLBUTRIN XL    30 tablet    Take 1 tablet (150 mg) by mouth every morning    Sexual dysfunction       lisinopril-hydrochlorothiazide 20-25 MG per tablet    PRINZIDE/ZESTORETIC    90 tablet    Take 1 tablet by mouth daily    Essential hypertension with goal blood pressure less than 140/90       sertraline 50 MG tablet    ZOLOFT    90 tablet    Take 1 tablet (50 mg) by mouth daily    Anxiety       Vitamin D (Cholecalciferol) 1000 UNITS Tabs      Take 2,000 Units by mouth daily        * Notice:  This list has 3 medication(s) that are the same as other medications prescribed for you. Read the directions carefully, and ask your doctor or other care provider to review them with you.

## 2017-07-07 NOTE — TELEPHONE ENCOUNTER
Please schedule for surgery:    Patient Name:  Modesta Bravo ([id number]).  :  1965      Physician requests assist from:  None  Requested Dates:  Next Thursday or friday  Schedule based on:  Cycle  Amount of time needed for the procedure:     Expected time off from work:    Surgeon:  Sergei Painting MD  Surgery permit to read:  [employer]  Preop Needed:  No  Location for surgery to performed:   In Clinic  Anesthesia:  Local   No Known Allergies  Nickel allergy:    EMB: YES,     DIAGNOSIS:  Menorrhagia    Special instructions:    Vendor Rep:    Meds Needed: Vicodin: 1 hour prior to procedure, Quantity 4, TORADOL: 60mg IM 30-45 minutes before procedure and Ibuprofen: 600mg 4 times a day for the 2 days before procedure

## 2017-07-11 LAB — COPATH REPORT: NORMAL

## 2017-07-12 ENCOUNTER — PRE VISIT (OUTPATIENT)
Dept: OTOLARYNGOLOGY | Facility: CLINIC | Age: 52
End: 2017-07-12

## 2017-07-12 NOTE — TELEPHONE ENCOUNTER
1.  Date/reason for appt:  7/19/17   Sinusitis/Cough    2.  Referring provider:  Dr Johnston, Internal    3.  Call to patient (Yes / No - short description):  No, referred    Records reviewed.  All records are in [facility] and imaging is in PACS.

## 2017-07-14 ENCOUNTER — ALLIED HEALTH/NURSE VISIT (OUTPATIENT)
Dept: NURSING | Facility: CLINIC | Age: 52
End: 2017-07-14
Payer: COMMERCIAL

## 2017-07-14 ENCOUNTER — OFFICE VISIT (OUTPATIENT)
Dept: OBGYN | Facility: CLINIC | Age: 52
End: 2017-07-14
Payer: COMMERCIAL

## 2017-07-14 VITALS
HEART RATE: 80 BPM | BODY MASS INDEX: 32.31 KG/M2 | TEMPERATURE: 98.1 F | HEIGHT: 68 IN | DIASTOLIC BLOOD PRESSURE: 75 MMHG | WEIGHT: 213.2 LBS | SYSTOLIC BLOOD PRESSURE: 126 MMHG

## 2017-07-14 DIAGNOSIS — G89.18 PAIN ASSOCIATED WITH SURGICAL PROCEDURE: ICD-10-CM

## 2017-07-14 DIAGNOSIS — N92.0 MENORRHAGIA WITH REGULAR CYCLE: Primary | ICD-10-CM

## 2017-07-14 DIAGNOSIS — Z01.812 PRE-PROCEDURE LAB EXAM: Primary | ICD-10-CM

## 2017-07-14 LAB — BETA HCG QUAL IFA URINE: NEGATIVE

## 2017-07-14 PROCEDURE — 58356 ENDOMETRIAL CRYOABLATION: CPT | Performed by: OBSTETRICS & GYNECOLOGY

## 2017-07-14 PROCEDURE — 96372 THER/PROPH/DIAG INJ SC/IM: CPT

## 2017-07-14 PROCEDURE — 84703 CHORIONIC GONADOTROPIN ASSAY: CPT | Performed by: OBSTETRICS & GYNECOLOGY

## 2017-07-14 PROCEDURE — 99207 ZZC NO CHARGE NURSE ONLY: CPT

## 2017-07-14 RX ORDER — KETOROLAC TROMETHAMINE 30 MG/ML
60 INJECTION, SOLUTION INTRAMUSCULAR; INTRAVENOUS ONCE
Qty: 2 ML | Refills: 0 | OUTPATIENT
Start: 2017-07-14 | End: 2017-07-14

## 2017-07-14 NOTE — MR AVS SNAPSHOT
After Visit Summary   7/14/2017    Modesta Bravo    MRN: [id number]           Patient Information     Date Of Birth          1965        Visit Information        Provider Department      7/14/2017 3:00 PM Sergei Painting MD; [facility]        Today's Diagnoses     Menorrhagia with regular cycle    -  1       Follow-ups after your visit        Your next 10 appointments already scheduled     Jul 19, 2017  9:00 AM CDT   (Arrive by 8:45 AM)   New Patient Visit with Carmella Maloney MD   [facility] ([facility] [place])    [address] [place] [address] [phone]              Who to contact     If you have questions or need follow up information about today's clinic visit or your schedule please contact [facility] directly at [phone].  Normal or non-critical lab and imaging results will be communicated to you by MyChart, letter or phone within 4 business days after the clinic has received the results. If you do not hear from us within 7 days, please contact the clinic through MyChart or phone. If you have a critical or abnormal lab result, we will notify you by phone as soon as possible.  Submit refill requests through [employer] or call your pharmacy and they will forward the refill request to us. Please allow 3 business days for your refill to be completed.          Additional Information About Your Visit        MyChart Information     [employer] gives you secure access to your electronic health record. If you see a primary care provider, you can also send messages to your care team and make appointments. If you have questions, please call your primary care clinic.  If you do not have a primary care provider, please call [phone] and they will assist you.        Care EveryWhere ID     This is your Care EveryWhere ID. This could be used by other organizations to access your  [facility] medical records  [id number]        Your Vitals Were     Last Period                   07/07/2017 (Exact Date)            Blood Pressure from Last 3 Encounters:   07/14/17 118/77   07/07/17 118/70   04/21/17 120/80    Weight from Last 3 Encounters:   07/14/17 213 lb 3.2 oz (96.7 kg)   04/21/17 213 lb (96.6 kg)   02/13/17 209 lb (94.8 kg)              We Performed the Following     ENDOMETRIAL CRYOABLATION W US GUIDE, INCLUDES ENDOMET SALAZAR          Today's Medication Changes          These changes are accurate as of: 7/14/17  4:35 PM.  If you have any questions, ask your nurse or doctor.               Start taking these medicines.        Dose/Directions    ketorolac 60 MG/2ML Soln injection   Commonly known as:  TORADOL   Used for:  Pain associated with surgical procedure        Dose:  60 mg   Inject 2 mLs (60 mg) into the muscle once for 1 dose   Quantity:  2 mL   Refills:  0            Where to get your medicines      Some of these will need a paper prescription and others can be bought over the counter.  Ask your nurse if you have questions.     You don't need a prescription for these medications     ketorolac 60 MG/2ML Soln injection                Primary Care Provider Office Phone # Fax #    Toro Hugo [phone]       [facility] [address] [place] [address] [address] [place] [address]        Equal Access to Services     TANYA PANTOJA AH: Hadii lucy espinozao Soyung, waaxda luqadaha, qaybta kaalmada nubia, penelope barnes. So [facility] [phone].    ATENCIÓN: Si habla español, tiene a soler disposición servicios gratuitos de asistencia lingüística. Calvin al [phone].    We comply with applicable federal civil rights laws and [place] laws. We do not discriminate on the basis of race, color, national origin, age, disability sex, sexual orientation or gender identity.            Thank you!     Thank you for choosing [facility]   for your care. Our goal is always to provide you with excellent care. Hearing back from our patients is one way we can continue to improve our services. Please take a few minutes to complete the written survey that you may receive in the mail after your visit with us. Thank you!             Your Updated Medication List - Protect others around you: Learn how to safely use, store and throw away your medicines at www.disposemymeds.org.          This list is accurate as of: 7/14/17  4:35 PM.  Always use your most recent med list.                   Brand Name Dispense Instructions for use Diagnosis    * albuterol 108 (90 BASE) MCG/ACT Inhaler    PROAIR HFA/PROVENTIL HFA/VENTOLIN HFA    3 Inhaler    Inhale 2 puffs into the lungs every 6 hours as needed for shortness of breath / dyspnea or wheezing    Acute bronchospasm       * albuterol 108 (90 BASE) MCG/ACT Inhaler    PROAIR HFA/PROVENTIL HFA/VENTOLIN HFA    1 Inhaler    Inhale 2 puffs into the lungs every 4 hours as needed for shortness of breath / dyspnea or wheezing /chest tightness/cough    Asthma exacerbation, Cough       * albuterol (2.5 MG/3ML) 0.083% neb solution      Take 1 vial (2.5 mg) by nebulization once for 1 dose    Asthma exacerbation       atorvastatin 20 MG tablet    LIPITOR    90 tablet    Take 1 tablet (20 mg) by mouth daily    Hyperlipidemia LDL goal <100       BABY ASPIRIN PO       Essential hypertension with goal blood pressure less than 140/90       benzonatate 100 MG capsule    TESSALON    42 capsule    Take 2 capsules (200 mg) by mouth 3 times daily as needed    Cough       buPROPion 150 MG 24 hr tablet    WELLBUTRIN XL    30 tablet    Take 1 tablet (150 mg) by mouth every morning    Sexual dysfunction       ketorolac 60 MG/2ML Soln injection    TORADOL    2 mL    Inject 2 mLs (60 mg) into the muscle once for 1 dose    Pain associated with surgical procedure       lisinopril-hydrochlorothiazide 20-25 MG per tablet    PRINZIDE/ZESTORETIC    90  tablet    Take 1 tablet by mouth daily    Essential hypertension with goal blood pressure less than 140/90       oxyCODONE 5 MG IR tablet    ROXICODONE    3 tablet    Take 1 tablet by mouth one hour before procedure. May repeat in 4 hours as needed.    Menorrhagia with regular cycle       sertraline 50 MG tablet    ZOLOFT    90 tablet    Take 1 tablet (50 mg) by mouth daily    Anxiety       Vitamin D (Cholecalciferol) 1000 UNITS Tabs      Take 2,000 Units by mouth daily        * Notice:  This list has 3 medication(s) that are the same as other medications prescribed for you. Read the directions carefully, and ask your doctor or other care provider to review them with you.

## 2017-07-14 NOTE — MR AVS SNAPSHOT
After Visit Summary   7/14/2017    Modesta Bravo    MRN: [id number]           Patient Information     Date Of Birth          1965        Visit Information        Provider Department      7/14/2017 2:00 PM [facility]        Today's Diagnoses     Pre-procedure lab exam    -  1    Pain associated with surgical procedure           Follow-ups after your visit        Your next 10 appointments already scheduled     Jul 19, 2017  9:00 AM CDT   (Arrive by 8:45 AM)   New Patient Visit with Carmella Maloney MD   [facility] ([facility] [place])    [address] [place] [address]  4th [facility] [address] [phone]              Who to contact     If you have questions or need follow up information about today's clinic visit or your schedule please contact [facility] directly at [phone].  Normal or non-critical lab and imaging results will be communicated to you by MyChart, letter or phone within 4 business days after the clinic has received the results. If you do not hear from us within 7 days, please contact the clinic through [employer] or phone. If you have a critical or abnormal lab result, we will notify you by phone as soon as possible.  Submit refill requests through [employer] or call your pharmacy and they will forward the refill request to us. Please allow 3 business days for your refill to be completed.          Additional Information About Your Visit        MyChart Information     [employer] gives you secure access to your electronic health record. If you see a primary care provider, you can also send messages to your care team and make appointments. If you have questions, please call your primary care clinic.  If you do not have a primary care provider, please call [phone] and they will assist you.        Care EveryWhere ID     This is your Care EveryWhere ID. This could be used by other organizations to  "access your [place] medical records  [id number]        Your Vitals Were     Pulse Temperature Height Last Period BMI (Body Mass Index)       80 98.1  F (36.7  C) (Oral) 5' 8\" (1.727 m) 07/07/2017 (Exact Date) 32.42 kg/m2        Blood Pressure from Last 3 Encounters:   07/14/17 126/75   07/07/17 118/70   04/21/17 120/80    Weight from Last 3 Encounters:   07/14/17 213 lb 3.2 oz (96.7 kg)   04/21/17 213 lb (96.6 kg)   02/13/17 209 lb (94.8 kg)              We Performed the Following     Beta HCG qual IFA urine     INJECTION INTRAMUSCULAR OR SUB-Q     KETOROLAC (TORADOL) 15 MG          Today's Medication Changes          These changes are accurate as of: 7/14/17  4:44 PM.  If you have any questions, ask your nurse or doctor.               Start taking these medicines.        Dose/Directions    ketorolac 60 MG/2ML Soln injection   Commonly known as:  TORADOL   Used for:  Pain associated with surgical procedure        Dose:  60 mg   Inject 2 mLs (60 mg) into the muscle once for 1 dose   Quantity:  2 mL   Refills:  0            Where to get your medicines      Some of these will need a paper prescription and others can be bought over the counter.  Ask your nurse if you have questions.     You don't need a prescription for these medications     ketorolac 60 MG/2ML Soln injection                Primary Care Provider Office Phone # Fax #    Toro Hugo [phone]       [address] [place] [address]        Equal Access to Services     TANYA PANTOJA AH: Hadii lucy espinozao Soyung, waaxda luqadaha, qaybta kaalmada nubia, waxay iditamiko barnes. So [facility] [phone].    ATENCIÓN: Si habla español, tiene a soler disposición servicios gratuitos de asistencia lingüística. Llame al [phone].    We comply with applicable federal civil rights laws and [place] laws. We do not discriminate on the basis of race, color, national origin, age, " disability sex, sexual orientation or gender identity.            Thank you!     Thank you for choosing [facility]  for your care. Our goal is always to provide you with excellent care. Hearing back from our patients is one way we can continue to improve our services. Please take a few minutes to complete the written survey that you may receive in the mail after your visit with us. Thank you!             Your Updated Medication List - Protect others around you: Learn how to safely use, store and throw away your medicines at www.disposemymeds.org.          This list is accurate as of: 7/14/17  4:44 PM.  Always use your most recent med list.                   Brand Name Dispense Instructions for use Diagnosis    * albuterol 108 (90 BASE) MCG/ACT Inhaler    PROAIR HFA/PROVENTIL HFA/VENTOLIN HFA    3 Inhaler    Inhale 2 puffs into the lungs every 6 hours as needed for shortness of breath / dyspnea or wheezing    Acute bronchospasm       * albuterol 108 (90 BASE) MCG/ACT Inhaler    PROAIR HFA/PROVENTIL HFA/VENTOLIN HFA    1 Inhaler    Inhale 2 puffs into the lungs every 4 hours as needed for shortness of breath / dyspnea or wheezing /chest tightness/cough    Asthma exacerbation, Cough       * albuterol (2.5 MG/3ML) 0.083% neb solution      Take 1 vial (2.5 mg) by nebulization once for 1 dose    Asthma exacerbation       atorvastatin 20 MG tablet    LIPITOR    90 tablet    Take 1 tablet (20 mg) by mouth daily    Hyperlipidemia LDL goal <100       BABY ASPIRIN PO       Essential hypertension with goal blood pressure less than 140/90       benzonatate 100 MG capsule    TESSALON    42 capsule    Take 2 capsules (200 mg) by mouth 3 times daily as needed    Cough       buPROPion 150 MG 24 hr tablet    WELLBUTRIN XL    30 tablet    Take 1 tablet (150 mg) by mouth every morning    Sexual dysfunction       ketorolac 60 MG/2ML Soln injection    TORADOL    2 mL    Inject 2 mLs (60 mg) into the muscle once for 1  dose    Pain associated with surgical procedure       lisinopril-hydrochlorothiazide 20-25 MG per tablet    PRINZIDE/ZESTORETIC    90 tablet    Take 1 tablet by mouth daily    Essential hypertension with goal blood pressure less than 140/90       oxyCODONE 5 MG IR tablet    ROXICODONE    3 tablet    Take 1 tablet by mouth one hour before procedure. May repeat in 4 hours as needed.    Menorrhagia with regular cycle       sertraline 50 MG tablet    ZOLOFT    90 tablet    Take 1 tablet (50 mg) by mouth daily    Anxiety       Vitamin D (Cholecalciferol) 1000 UNITS Tabs      Take 2,000 Units by mouth daily        * Notice:  This list has 3 medication(s) that are the same as other medications prescribed for you. Read the directions carefully, and ask your doctor or other care provider to review them with you.

## 2017-07-14 NOTE — NURSING NOTE
"Verified name and  with pt.  Pre-procedure Medications: Ibuprofen, Oxycodone, Toradol     Pt took Ibuprofen 600 mg 4 times a day on Wednesday, 3 times a day Thursday and twice today. Last time Ibuprofen taken was at 1:00 PM. Talked with Dr. Painting about this and he gave verbal \"OK\" to still give 60 mg of Toradol IM.   Pt took Oxycodone 5 MG IR at 2:04 PM on 17.    Pre-Procedure BP: at 1:56 PM: 118/77 mmHg, HR: 75 bpm (Right Arm, Automatic cuff)  Temp: 98.5 F (Oral)    LMP: 2017    Serum Pregnancy Test: Urine pregnancy test negative    Allergies: No Known Allergies    Reviewed post-procedure instructions with pt. Consent form signed at pt, nurse (myself-Miroslava Uriostegui RN as witness) and Dr. Sergei Painting. Consent form was signed before pt took Oxycodone.    IF ALLERGIC TO ASA OR NSAIDS DO NOT GIVE TORADOL  Ketorolac (Toradol) injection: 60 mg IM  Time given: 2:33 PM  Site: RUST - Gluteus  Lot #: [id number]  Expiration Date:  2017  NDC: 92949-689-74  : [employer]    Anesthesia Used: Carbocaine  Amount given: 30 cc.    Expiration Date: 2018  Lot #: [id number]      Uterine Sound Depth: Dr. Painting was unable to determine during the procedure.      FREEZE CYCLES:  Right Cornual Freeze: 7 minutes   Left Cornual Freeze:  7 minutes       Post Procedure Vital Signs:  4:20 PM Temperature (Oral): 98.1 F  4:24 PM Blood Pressure (Right Arm, Automatic cuff): 126/75 mmHg, HR: 80 bpm    Discharge instructions given: Yes and noted on instructions the times she would be able to take Oxycodone or Ibuprofen. Instructed pt with the Oxycodone no alcohol or driving. Pt asked if she needs to do follow-up from this procedure, informed her \"No\" unless issues arise. Pt verbalized understanding.    Comments: Patient tolerated injection well with minimal pain. Walked pt to lobby to meet her friend that was picking her up. Discharged from clinic in stable condition.        RN: Miroslava " Gila   Surgeon: Dr. Sergei Painting  Ultrasound Tech: Melissa Redding and Elva Marino

## 2017-07-14 NOTE — PROGRESS NOTES
INDICATIONS:                                                      Is a pregnancy test required: Yes.  Was it positive or negative?  Negative  Was a consent obtained?  Yes    This Modesta Bravo is a 51 year old female , , Patient's last menstrual period was 2017 (exact date). presents for   office HerOption cryo- ablation with the purpose of reducing menstrual flow to a manageable amount or possibly inducing total amenorrhea.  She has read the HerOption pamphlet . She signed the informed consent form after all her questions had been answered to her satisfaction. She confirmed to me that she understands that this procedure is intended to reduce the quantity of menstrual flow.  She indicated to me that she uses vasectomy for contraception and that she knows  pregnancy after endometrial ablation is not recommended.    Today's PHQ-2 Score:   PHQ-2 (  [employer]) 10/5/2015   Q1: Little interest or pleasure in doing things 0   Q2: Feeling down, depressed or hopeless 0   PHQ-2 Score 0       A urine pregnancy test was performed today and the result was negative.    Pre-operative medications given to Modesta included:Vicodin, Ibuprofen and Toradol    PROCEDURE:                                                      Modesta  was placed in the lithotomy position and draped appropriately. A metal speculum was introduced into her vagina and the cervix was prepped with Betadine . A single-tooth tenaculum was used. A paracervical block was placed with a total of 30 CC of 1% plain polocaine using a 1.5 inch 25 gauge needle on a needle extender. Approximately zero minutes were allowed to elapse for the  block to take maximal effect. The HerOption machine had previously gone through it's pre-cool cycle.    Ultrasound showed cavity width of <3cm.  The uterus sounded to 8 cm under ultrasound guidance. Dilation was necessary for the probe to be inserted.    The center of fundus was not frozen. The right cornua was probed under  ultrasound guidance.  This was frozen for  7  minutes.  The left cornua was then probed under ultrasound guidance. This was frozen for  7  Minutes.  The lower uterine segment was not frozen.     The procedure was terminated at this point. The patient tolerated the procedure well.  The patient was discharged after an observation period. She was given discharge instructions and instructed in taking pain meds at home.  Patient was instructed to expect copious vaginal discharge in the next 4 weeks. She understands it will take multiple cycles to see the full reduction in menses.    Discussed possible increase in cramping later today.  Reviewed expected discharge lasting 4-5 weeks.  Discussed 3-6 months to see decrease in bleeding.    Sergei Painting MD

## 2017-07-19 ENCOUNTER — OFFICE VISIT (OUTPATIENT)
Dept: OTOLARYNGOLOGY | Facility: CLINIC | Age: 52
End: 2017-07-19

## 2017-07-19 VITALS — BODY MASS INDEX: 32.28 KG/M2 | HEIGHT: 68 IN | WEIGHT: 213 LBS

## 2017-07-19 DIAGNOSIS — R05.3 CHRONIC COUGH: Primary | ICD-10-CM

## 2017-07-19 ASSESSMENT — PAIN SCALES - GENERAL: PAINLEVEL: NO PAIN (0)

## 2017-07-19 NOTE — MR AVS SNAPSHOT
After Visit Summary   2017    Modesta Bravo    MRN: [id number]           Patient Information     Date Of Birth          1965        Visit Information        Provider Department      2017 9:00 AM Carmella Maloney MD [facility] Th[facility]        Today's Diagnoses     Chronic cough    -  1      Care Instructions    Plan of care:  Talk to your pharmacist about reflux medication  Follow with the allergist as suggested  Clinic contact information:  1. To schedule an appointment call [phone], option 1  2. To talk to the Triage RN call [phone], option 3  3. If you need to speak to Lisa or get a message to your doctor on a Friday, call the triage RN  4. LisaRN: [phone]  5. Surgery scheduling:      Carol Waite: [phone]      Germaine Connellter: [phone]  6. Fax: [phone]  7. [phone]            Follow-ups after your visit        Additional Services     ALLERGY/ASTHMA ADULT REFERRAL       Your provider has referred you to: [facility] - [phone]  [email or web address]  Dr Guerra, Allergy clinic    Please be aware that coverage of these services is subject to the terms and limitations of your health insurance plan.  Call member services at your health plan with any benefit or coverage questions.      Please bring the following with you to your appointment:    (1) Any X-Rays, CTs or MRIs which have been performed.  Contact the facility where they were done to arrange for  prior to your scheduled appointment.    (2) List of current medications  (3) This referral request   (4) Any documents/labs given to you for this referral                  Who to contact     Please call your clinic at [phone] to:    Ask questions about your health    Make or cancel appointments    Discuss your medicines    Learn about your test results    Speak to your doctor   If you have compliments or  "concerns about an experience at your clinic, or if you wish to file a complaint, please contact [facility] Patient Relations at [phone] or email us at [email or web address]         Additional Information About Your Visit        MyChart Information     [employer] gives you secure access to your electronic health record. If you see a primary care provider, you can also send messages to your care team and make appointments. If you have questions, please call your primary care clinic.  If you do not have a primary care provider, please call [phone] and they will assist you.      [employer] is an electronic gateway that provides easy, online access to your medical records. With [employer], you can request a clinic appointment, read your test results, renew a prescription or communicate with your care team.     To access your existing account, please contact your [facility] or call [phone] for assistance.        Care EveryWhere ID     This is your Care EveryWhere ID. This could be used by other organizations to access your [place] medical records  [id number]        Your Vitals Were     Height Last Period BMI (Body Mass Index)             1.727 m (5' 7.99\") 07/07/2017 (Exact Date) 32.39 kg/m2          Blood Pressure from Last 3 Encounters:   07/14/17 126/75   07/07/17 118/70   04/21/17 120/80    Weight from Last 3 Encounters:   07/19/17 96.6 kg (213 lb)   07/14/17 96.7 kg (213 lb 3.2 oz)   04/21/17 96.6 kg (213 lb)              We Performed the Following     ALLERGY/ASTHMA ADULT REFERRAL        Primary Care Provider Office Phone # Fax #    Toro Hugo [phone]       [facility] [place] [address] [place] [address]        Equal Access to Services     ALEJO PANTOJA : Buddy Kenyon, armando larose, penelope morales. So wa " [phone].    ATENCIÓN: Si yousif snyder, tiene a soler disposición servicios gratuitos de asistencia lingüística. Calvin sierra [phone].    We comply with applicable federal civil rights laws and [place] laws. We do not discriminate on the basis of race, color, national origin, age, disability sex, sexual orientation or gender identity.            Thank you!     Thank you for choosing [facility]  for your care. Our goal is always to provide you with excellent care. Hearing back from our patients is one way we can continue to improve our services. Please take a few minutes to complete the written survey that you may receive in the mail after your visit with us. Thank you!             Your Updated Medication List - Protect others around you: Learn how to safely use, store and throw away your medicines at www.disposemymeds.org.          This list is accurate as of: 7/19/17  6:01 PM.  Always use your most recent med list.                   Brand Name Dispense Instructions for use Diagnosis    * albuterol 108 (90 BASE) MCG/ACT Inhaler    PROAIR HFA/PROVENTIL HFA/VENTOLIN HFA    3 Inhaler    Inhale 2 puffs into the lungs every 6 hours as needed for shortness of breath / dyspnea or wheezing    Acute bronchospasm       * albuterol 108 (90 BASE) MCG/ACT Inhaler    PROAIR HFA/PROVENTIL HFA/VENTOLIN HFA    1 Inhaler    Inhale 2 puffs into the lungs every 4 hours as needed for shortness of breath / dyspnea or wheezing /chest tightness/cough    Asthma exacerbation, Cough       * albuterol (2.5 MG/3ML) 0.083% neb solution      Take 1 vial (2.5 mg) by nebulization once for 1 dose    Asthma exacerbation       atorvastatin 20 MG tablet    LIPITOR    90 tablet    Take 1 tablet (20 mg) by mouth daily    Hyperlipidemia LDL goal <100       BABY ASPIRIN PO       Essential hypertension with goal blood pressure less than 140/90       benzonatate 100 MG capsule    TESSALON    42 capsule    Take 2 capsules (200 mg) by  mouth 3 times daily as needed    Cough       buPROPion 150 MG 24 hr tablet    WELLBUTRIN XL    30 tablet    Take 1 tablet (150 mg) by mouth every morning    Sexual dysfunction       lisinopril-hydrochlorothiazide 20-25 MG per tablet    PRINZIDE/ZESTORETIC    90 tablet    Take 1 tablet by mouth daily    Essential hypertension with goal blood pressure less than 140/90       oxyCODONE 5 MG IR tablet    ROXICODONE    3 tablet    Take 1 tablet by mouth one hour before procedure. May repeat in 4 hours as needed.    Menorrhagia with regular cycle       sertraline 50 MG tablet    ZOLOFT    90 tablet    Take 1 tablet (50 mg) by mouth daily    Anxiety       Vitamin D (Cholecalciferol) 1000 UNITS Tabs      Take 2,000 Units by mouth daily        * Notice:  This list has 3 medication(s) that are the same as other medications prescribed for you. Read the directions carefully, and ask your doctor or other care provider to review them with you.

## 2017-07-19 NOTE — NURSING NOTE
Chief Complaint   Patient presents with     Consult     acute sinusitis     Cindy Manzanares Medical Assistant

## 2017-07-19 NOTE — LETTER
2017      RE: Modesta Bravo  [address] [place] [address]       Otolaryngology Adult Consultation    Patient: Modesta Bravo   : 1965     Patient presents with:  Consult:   Chief Complaint   Patient presents with     Consult     acute sinusitis        Referring Provider: Shanta Johnston  Consulting Physician:  Carmella Maloney MD       Assessment/Plan: Chronic cough, suspect silent GERD and asthma as etiologies.  Would like her to see am allergist, but we will empirically start her on a proton pump inhibitor (PPI) in the interim.  Discussed this at length with the patient, and she agrees to plan.  Nasal exam normal, CT unremarkable and does not sound rhinogenic.     HPI: Modesta Bravo is a 51-year-old female seen today in the Otolaryngology Clinic for a history of cough variant asthma and sinus issues.  Symptoms include intermittent severe congestion, and lots of mucous. Chronic cough is severe and causes vomiting three times per week.  She wonders if maybe something she is eating is causing this.  Duration of symptoms has been three years. Previous medical therapies have been tried and their effects include prednisone with weight gain.  She has had to get nebs in clinic for asthma symptoms. Associated lower respiratory symptoms are wheezing, coughing, and shortness of breath.  Denies overt GERD.     Previous work up has been performed including thorough evaluation with Dr. Johnston.  She has been trying to 'tough it out'.  A previous sinus CT scan was unremarkable.    She also has had Lyme's disease.    Current Outpatient Prescriptions on File Prior to Visit:  buPROPion (WELLBUTRIN XL) 150 MG 24 hr tablet Take 1 tablet (150 mg) by mouth every morning   oxyCODONE (ROXICODONE) 5 MG IR tablet Take 1 tablet by mouth one hour before procedure. May repeat in 4 hours as needed.   benzonatate (TESSALON) 100 MG capsule Take 2 capsules (200 mg) by mouth 3 times daily as needed   albuterol  (PROAIR HFA/PROVENTIL HFA/VENTOLIN HFA) 108 (90 BASE) MCG/ACT Inhaler Inhale 2 puffs into the lungs every 4 hours as needed for shortness of breath / dyspnea or wheezing /chest tightness/cough   albuterol (2.5 MG/3ML) 0.083% neb solution Take 1 vial (2.5 mg) by nebulization once for 1 dose   BABY ASPIRIN PO    atorvastatin (LIPITOR) 20 MG tablet Take 1 tablet (20 mg) by mouth daily   sertraline (ZOLOFT) 50 MG tablet Take 1 tablet (50 mg) by mouth daily   lisinopril-hydrochlorothiazide (PRINZIDE,ZESTORETIC) 20-25 MG per tablet Take 1 tablet by mouth daily   albuterol (PROAIR HFA, PROVENTIL HFA, VENTOLIN HFA) 108 (90 BASE) MCG/ACT inhaler Inhale 2 puffs into the lungs every 6 hours as needed for shortness of breath / dyspnea or wheezing   Vitamin D, Cholecalciferol, 1000 UNITS TABS Take 2,000 Units by mouth daily     No current facility-administered medications on file prior to visit.      No Known Allergies    Past Medical History:   Diagnosis Date     Anxiety      Cough variant asthma      Family history of breast cancer     Pt is BrCa negative     Hyperlipidaemia      Hyperparathyroidism (H)     seen by Dr. Bosch     Hypertension      Menorrhagia      Vitamin deficiency          Review of Systems   ENT ROS 7/19/2017   Constitutional Weight gain, Problems with sleep   Ears, Nose, Throat Ear pain, Nasal congestion or drainage   Cardiopulmonary Cough, Wheezing, Chest pain   Gastrointestinal/Genitourinary Unexplained nausea/vomiting   Allergy/Immunology Allergies or hay fever        Review of Systems: 14 point review of systems was negative other than the symptoms noted above.    Physical Exam:    General Assessment   The patient is alert, oriented and in no acute distress.   Head/Face/Scalp  Grossly normal.   Ears  Normal canals, auricles and tympanic membranes.   Nose  External nose is straight, skin is normal. Intranasal exam (anterior rhinoscopy) reveals normal moist mucosa, turbinate tissue without edema,  erythema or crusting.  Septum mainly in the midline.  Small spur inferiorly on left, at level of middle meatus.  Mouth  Oral cavity shows healthy mucosa with out ulceration, masses or other lesions involving the tongue, palate, buccal mucosa, floor of mouth or gingiva.  Dentition in good repair.  Oropharynx with normal tonsils, posterior wall and base of tongue.  Neck  No significant adenopathy, thyroid or salivary gland abnormality.       Imaging:    Results for orders placed in visit on 08/29/16   CT MAXILLOFACIAL W/O CONTRAST    Status: Normal 8/29/2016    Narrative CT MAXILLOFACIAL W/O CONTRAST 8/29/2016 9:55 AM    History: Acute sinusitis, unspecified     Comparison: No prior similar studies     Technique:  Using thin collimation multidetector helical acquisition  technique, axial, coronal, and sagittal thin section CT images were  reconstructed through the paranasal sinuses. Images were reviewed in  bone and soft tissue windows.    Findings:     Minimal polypoid mucosal thickening along the floors of bilateral  maxillary sinuses.   Sphenoid sinus is clear.  The frontal sinuses are clear. The ethmoid air cells are clear.    The ostiomeatal units appear patent bilaterally. The bony walls of the  paranasal sinuses are intact.    The adenoid tonsils in the nasopharynx are unremarkable.      Impression Impression:    Minimal polypoid mucosal thickening along the floors of the maxillary  sinuses. Otherwise normal fluid levels to suggest acute sinusitis.     I have personally reviewed the examination and initial interpretation  and I agree with the findings.    MD Carmella YATES MD

## 2017-07-19 NOTE — LETTER
2017       RE: Modesta Bravo  [address] [place] [address]     Dear Colleague,    Thank you for referring your patient, Modesta Bravo, to the [facility] at [facility]. Please see a copy of my visit note below.    Otolaryngology Adult Consultation    Patient: Modesta Bravo   : 1965     Patient presents with:  Consult:   Chief Complaint   Patient presents with     Consult     acute sinusitis        Referring Provider: Shanta Johnston  Consulting Physician:  Carmella Maloney MD       Assessment/Plan: Chronic cough, suspect silent GERD and asthma as etiologies.  Would like her to see am allergist, but we will empirically start her on a proton pump inhibitor (PPI) in the interim.  Discussed this at length with the patient, and she agrees to plan.  Nasal exam normal, CT unremarkable and does not sound rhinogenic.     HPI: Modesta Bravo is a 51-year-old female seen today in the Otolaryngology Clinic for a history of cough variant asthma and sinus issues.  Symptoms include intermittent severe congestion, and lots of mucous. Chronic cough is severe and causes vomiting three times per week.  She wonders if maybe something she is eating is causing this.  Duration of symptoms has been three years. Previous medical therapies have been tried and their effects include prednisone with weight gain.  She has had to get nebs in clinic for asthma symptoms. Associated lower respiratory symptoms are wheezing, coughing, and shortness of breath.  Denies overt GERD.     Previous work up has been performed including thorough evaluation with Dr. Johnston.  She has been trying to 'tough it out'.  A previous sinus CT scan was unremarkable.    She also has had Lyme's disease.    Current Outpatient Prescriptions on File Prior to Visit:  buPROPion (WELLBUTRIN XL) 150 MG 24 hr tablet Take 1 tablet (150 mg) by mouth every morning   oxyCODONE (ROXICODONE) 5 MG  IR tablet Take 1 tablet by mouth one hour before procedure. May repeat in 4 hours as needed.   benzonatate (TESSALON) 100 MG capsule Take 2 capsules (200 mg) by mouth 3 times daily as needed   albuterol (PROAIR HFA/PROVENTIL HFA/VENTOLIN HFA) 108 (90 BASE) MCG/ACT Inhaler Inhale 2 puffs into the lungs every 4 hours as needed for shortness of breath / dyspnea or wheezing /chest tightness/cough   albuterol (2.5 MG/3ML) 0.083% neb solution Take 1 vial (2.5 mg) by nebulization once for 1 dose   BABY ASPIRIN PO    atorvastatin (LIPITOR) 20 MG tablet Take 1 tablet (20 mg) by mouth daily   sertraline (ZOLOFT) 50 MG tablet Take 1 tablet (50 mg) by mouth daily   lisinopril-hydrochlorothiazide (PRINZIDE,ZESTORETIC) 20-25 MG per tablet Take 1 tablet by mouth daily   albuterol (PROAIR HFA, PROVENTIL HFA, VENTOLIN HFA) 108 (90 BASE) MCG/ACT inhaler Inhale 2 puffs into the lungs every 6 hours as needed for shortness of breath / dyspnea or wheezing   Vitamin D, Cholecalciferol, 1000 UNITS TABS Take 2,000 Units by mouth daily     No current facility-administered medications on file prior to visit.      No Known Allergies    Past Medical History:   Diagnosis Date     Anxiety      Cough variant asthma      Family history of breast cancer     Pt is BrCa negative     Hyperlipidaemia      Hyperparathyroidism (H)     seen by Dr. Bosch     Hypertension      Menorrhagia      Vitamin deficiency          Review of Systems   ENT ROS 7/19/2017   Constitutional Weight gain, Problems with sleep   Ears, Nose, Throat Ear pain, Nasal congestion or drainage   Cardiopulmonary Cough, Wheezing, Chest pain   Gastrointestinal/Genitourinary Unexplained nausea/vomiting   Allergy/Immunology Allergies or hay fever        Review of Systems: 14 point review of systems was negative other than the symptoms noted above.    Physical Exam:    General Assessment   The patient is alert, oriented and in no acute distress.   Head/Face/Scalp  Grossly normal.    Ears  Normal canals, auricles and tympanic membranes.   Nose  External nose is straight, skin is normal. Intranasal exam (anterior rhinoscopy) reveals normal moist mucosa, turbinate tissue without edema, erythema or crusting.  Septum mainly in the midline.  Small spur inferiorly on left, at level of middle meatus.  Mouth  Oral cavity shows healthy mucosa with out ulceration, masses or other lesions involving the tongue, palate, buccal mucosa, floor of mouth or gingiva.  Dentition in good repair.  Oropharynx with normal tonsils, posterior wall and base of tongue.  Neck  No significant adenopathy, thyroid or salivary gland abnormality.       Imaging:    Results for orders placed in visit on 08/29/16   CT MAXILLOFACIAL W/O CONTRAST    Status: Normal 8/29/2016    Narrative CT MAXILLOFACIAL W/O CONTRAST 8/29/2016 9:55 AM    History: Acute sinusitis, unspecified     Comparison: No prior similar studies     Technique:  Using thin collimation multidetector helical acquisition  technique, axial, coronal, and sagittal thin section CT images were  reconstructed through the paranasal sinuses. Images were reviewed in  bone and soft tissue windows.    Findings:     Minimal polypoid mucosal thickening along the floors of bilateral  maxillary sinuses.   Sphenoid sinus is clear.  The frontal sinuses are clear. The ethmoid air cells are clear.    The ostiomeatal units appear patent bilaterally. The bony walls of the  paranasal sinuses are intact.    The adenoid tonsils in the nasopharynx are unremarkable.      Impression Impression:    Minimal polypoid mucosal thickening along the floors of the maxillary  sinuses. Otherwise normal fluid levels to suggest acute sinusitis.     I have personally reviewed the examination and initial interpretation  and I agree with the findings.    ROBER MERRITT MD          Again, thank you for allowing me to participate in the care of your patient.      Sincerely,    Carmella Maloney MD

## 2017-07-19 NOTE — PROGRESS NOTES
Otolaryngology Adult Consultation    Patient: Modesta Bravo   : 1965     Patient presents with:  Consult:   Chief Complaint   Patient presents with     Consult     acute sinusitis        Referring Provider: Shanta Johnston  Consulting Physician:  Carmella Maloney MD       Assessment/Plan: Chronic cough, suspect silent GERD and asthma as etiologies.  Would like her to see am allergist, but we will empirically start her on a proton pump inhibitor (PPI) in the interim.  Discussed this at length with the patient, and she agrees to plan.  Nasal exam normal, CT unremarkable and does not sound rhinogenic.     HPI: Modesta Bravo is a 51-year-old female seen today in the Otolaryngology Clinic for a history of cough variant asthma and sinus issues.  Symptoms include intermittent severe congestion, and lots of mucous. Chronic cough is severe and causes vomiting three times per week.  She wonders if maybe something she is eating is causing this.  Duration of symptoms has been three years. Previous medical therapies have been tried and their effects include prednisone with weight gain.  She has had to get nebs in clinic for asthma symptoms. Associated lower respiratory symptoms are wheezing, coughing, and shortness of breath.  Denies overt GERD.     Previous work up has been performed including thorough evaluation with Dr. Johnston.  She has been trying to 'tough it out'.  A previous sinus CT scan was unremarkable.    She also has had Lyme's disease.    Current Outpatient Prescriptions on File Prior to Visit:  buPROPion (WELLBUTRIN XL) 150 MG 24 hr tablet Take 1 tablet (150 mg) by mouth every morning   oxyCODONE (ROXICODONE) 5 MG IR tablet Take 1 tablet by mouth one hour before procedure. May repeat in 4 hours as needed.   benzonatate (TESSALON) 100 MG capsule Take 2 capsules (200 mg) by mouth 3 times daily as needed   albuterol (PROAIR HFA/PROVENTIL HFA/VENTOLIN HFA) 108 (90 BASE) MCG/ACT Inhaler Inhale 2  puffs into the lungs every 4 hours as needed for shortness of breath / dyspnea or wheezing /chest tightness/cough   albuterol (2.5 MG/3ML) 0.083% neb solution Take 1 vial (2.5 mg) by nebulization once for 1 dose   BABY ASPIRIN PO    atorvastatin (LIPITOR) 20 MG tablet Take 1 tablet (20 mg) by mouth daily   sertraline (ZOLOFT) 50 MG tablet Take 1 tablet (50 mg) by mouth daily   lisinopril-hydrochlorothiazide (PRINZIDE,ZESTORETIC) 20-25 MG per tablet Take 1 tablet by mouth daily   albuterol (PROAIR HFA, PROVENTIL HFA, VENTOLIN HFA) 108 (90 BASE) MCG/ACT inhaler Inhale 2 puffs into the lungs every 6 hours as needed for shortness of breath / dyspnea or wheezing   Vitamin D, Cholecalciferol, 1000 UNITS TABS Take 2,000 Units by mouth daily     No current facility-administered medications on file prior to visit.      No Known Allergies    Past Medical History:   Diagnosis Date     Anxiety      Cough variant asthma      Family history of breast cancer     Pt is BrCa negative     Hyperlipidaemia      Hyperparathyroidism (H)     seen by Dr. Bosch     Hypertension      Menorrhagia      Vitamin deficiency          Review of Systems   ENT ROS 7/19/2017   Constitutional Weight gain, Problems with sleep   Ears, Nose, Throat Ear pain, Nasal congestion or drainage   Cardiopulmonary Cough, Wheezing, Chest pain   Gastrointestinal/Genitourinary Unexplained nausea/vomiting   Allergy/Immunology Allergies or hay fever        Review of Systems: 14 point review of systems was negative other than the symptoms noted above.    Physical Exam:    General Assessment   The patient is alert, oriented and in no acute distress.   Head/Face/Scalp  Grossly normal.   Ears  Normal canals, auricles and tympanic membranes.   Nose  External nose is straight, skin is normal. Intranasal exam (anterior rhinoscopy) reveals normal moist mucosa, turbinate tissue without edema, erythema or crusting.  Septum mainly in the midline.  Small spur inferiorly on  left, at level of middle meatus.  Mouth  Oral cavity shows healthy mucosa with out ulceration, masses or other lesions involving the tongue, palate, buccal mucosa, floor of mouth or gingiva.  Dentition in good repair.  Oropharynx with normal tonsils, posterior wall and base of tongue.  Neck  No significant adenopathy, thyroid or salivary gland abnormality.       Imaging:    Results for orders placed in visit on 08/29/16   CT MAXILLOFACIAL W/O CONTRAST    Status: Normal 8/29/2016    Narrative CT MAXILLOFACIAL W/O CONTRAST 8/29/2016 9:55 AM    History: Acute sinusitis, unspecified     Comparison: No prior similar studies     Technique:  Using thin collimation multidetector helical acquisition  technique, axial, coronal, and sagittal thin section CT images were  reconstructed through the paranasal sinuses. Images were reviewed in  bone and soft tissue windows.    Findings:     Minimal polypoid mucosal thickening along the floors of bilateral  maxillary sinuses.   Sphenoid sinus is clear.  The frontal sinuses are clear. The ethmoid air cells are clear.    The ostiomeatal units appear patent bilaterally. The bony walls of the  paranasal sinuses are intact.    The adenoid tonsils in the nasopharynx are unremarkable.      Impression Impression:    Minimal polypoid mucosal thickening along the floors of the maxillary  sinuses. Otherwise normal fluid levels to suggest acute sinusitis.     I have personally reviewed the examination and initial interpretation  and I agree with the findings.    ROBER MERRITT MD

## 2017-07-19 NOTE — PATIENT INSTRUCTIONS
Plan of care:  Talk to your pharmacist about reflux medication  Follow with the allergist as suggested  Clinic contact information:  1. To schedule an appointment call [phone], option 1  2. To talk to the Triage RN call [phone], option 3  3. If you need to speak to Lisa or get a message to your doctor on a Friday, call the triage RN  4. LisaRN: [phone]  5. Surgery scheduling:      Carol Waite: [phone]      Germaine Storey: [phone]  6. Fax: [phone]  7. [phone]

## 2017-07-26 ENCOUNTER — APPOINTMENT (OUTPATIENT)
Dept: CT IMAGING | Facility: CLINIC | Age: 52
DRG: 863 | End: 2017-07-26
Attending: EMERGENCY MEDICINE
Payer: COMMERCIAL

## 2017-07-26 ENCOUNTER — APPOINTMENT (OUTPATIENT)
Dept: ULTRASOUND IMAGING | Facility: CLINIC | Age: 52
DRG: 863 | End: 2017-07-26
Attending: EMERGENCY MEDICINE
Payer: COMMERCIAL

## 2017-07-26 ENCOUNTER — TELEPHONE (OUTPATIENT)
Dept: NURSING | Facility: CLINIC | Age: 52
End: 2017-07-26

## 2017-07-26 ENCOUNTER — HOSPITAL ENCOUNTER (INPATIENT)
Facility: CLINIC | Age: 52
LOS: 2 days | Discharge: HOME | DRG: 863 | End: 2017-07-28
Attending: EMERGENCY MEDICINE | Admitting: OBSTETRICS & GYNECOLOGY
Payer: COMMERCIAL

## 2017-07-26 DIAGNOSIS — N71.9 ENDOMETRITIS: ICD-10-CM

## 2017-07-26 LAB
ALBUMIN SERPL-MCNC: 3.6 G/DL (ref 3.4–5)
ALBUMIN UR-MCNC: NEGATIVE MG/DL
ALP SERPL-CCNC: 66 U/L (ref 40–150)
ALT SERPL W P-5'-P-CCNC: 60 U/L (ref 0–50)
ANION GAP SERPL CALCULATED.3IONS-SCNC: 8 MMOL/L (ref 3–14)
APPEARANCE UR: CLEAR
AST SERPL W P-5'-P-CCNC: 40 U/L (ref 0–45)
BASOPHILS # BLD AUTO: 0.1 10E9/L (ref 0–0.2)
BASOPHILS NFR BLD AUTO: 0.2 %
BILIRUB SERPL-MCNC: 0.4 MG/DL (ref 0.2–1.3)
BILIRUB UR QL STRIP: NEGATIVE
BUN SERPL-MCNC: 16 MG/DL (ref 7–30)
CALCIUM SERPL-MCNC: 9.2 MG/DL (ref 8.5–10.1)
CHLORIDE SERPL-SCNC: 103 MMOL/L (ref 94–109)
CO2 SERPL-SCNC: 26 MMOL/L (ref 20–32)
COLOR UR AUTO: YELLOW
CREAT SERPL-MCNC: 0.77 MG/DL (ref 0.52–1.04)
DIFFERENTIAL METHOD BLD: ABNORMAL
EOSINOPHIL # BLD AUTO: 0 10E9/L (ref 0–0.7)
EOSINOPHIL NFR BLD AUTO: 0.2 %
ERYTHROCYTE [DISTWIDTH] IN BLOOD BY AUTOMATED COUNT: 12.3 % (ref 10–15)
GFR SERPL CREATININE-BSD FRML MDRD: 79 ML/MIN/1.7M2
GLUCOSE SERPL-MCNC: 88 MG/DL (ref 70–99)
GLUCOSE UR STRIP-MCNC: NEGATIVE MG/DL
HCT VFR BLD AUTO: 37.8 % (ref 35–47)
HGB BLD-MCNC: 12.3 G/DL (ref 11.7–15.7)
HGB UR QL STRIP: ABNORMAL
IMM GRANULOCYTES # BLD: 0.1 10E9/L (ref 0–0.4)
IMM GRANULOCYTES NFR BLD: 0.7 %
KETONES UR STRIP-MCNC: NEGATIVE MG/DL
LACTATE BLD-SCNC: 1.9 MMOL/L (ref 0.7–2.1)
LEUKOCYTE ESTERASE UR QL STRIP: ABNORMAL
LYMPHOCYTES # BLD AUTO: 0.8 10E9/L (ref 0.8–5.3)
LYMPHOCYTES NFR BLD AUTO: 4 %
MCH RBC QN AUTO: 31.9 PG (ref 26.5–33)
MCHC RBC AUTO-ENTMCNC: 32.5 G/DL (ref 31.5–36.5)
MCV RBC AUTO: 98 FL (ref 78–100)
MICRO REPORT STATUS: NORMAL
MONOCYTES # BLD AUTO: 0.7 10E9/L (ref 0–1.3)
MONOCYTES NFR BLD AUTO: 3.5 %
MUCOUS THREADS #/AREA URNS LPF: PRESENT /LPF
NEUTROPHILS # BLD AUTO: 18.7 10E9/L (ref 1.6–8.3)
NEUTROPHILS NFR BLD AUTO: 91.4 %
NITRATE UR QL: NEGATIVE
NRBC # BLD AUTO: 0 10*3/UL
NRBC BLD AUTO-RTO: 0 /100
PH UR STRIP: 7 PH (ref 5–7)
PLATELET # BLD AUTO: 229 10E9/L (ref 150–450)
POTASSIUM SERPL-SCNC: 3.9 MMOL/L (ref 3.4–5.3)
PROT SERPL-MCNC: 7.2 G/DL (ref 6.8–8.8)
RBC # BLD AUTO: 3.86 10E12/L (ref 3.8–5.2)
RBC #/AREA URNS AUTO: 8 /HPF (ref 0–2)
SODIUM SERPL-SCNC: 137 MMOL/L (ref 133–144)
SP GR UR STRIP: 1.02 (ref 1–1.03)
SPECIMEN SOURCE: NORMAL
SQUAMOUS #/AREA URNS AUTO: 1 /HPF (ref 0–1)
URN SPEC COLLECT METH UR: ABNORMAL
UROBILINOGEN UR STRIP-MCNC: 0 MG/DL (ref 0–2)
WBC # BLD AUTO: 20.4 10E9/L (ref 4–11)
WBC #/AREA URNS AUTO: 8 /HPF (ref 0–2)
WET PREP SPEC: NORMAL

## 2017-07-26 PROCEDURE — 87077 CULTURE AEROBIC IDENTIFY: CPT | Performed by: EMERGENCY MEDICINE

## 2017-07-26 PROCEDURE — 96376 TX/PRO/DX INJ SAME DRUG ADON: CPT

## 2017-07-26 PROCEDURE — 87070 CULTURE OTHR SPECIMN AEROBIC: CPT | Performed by: EMERGENCY MEDICINE

## 2017-07-26 PROCEDURE — 76830 TRANSVAGINAL US NON-OB: CPT

## 2017-07-26 PROCEDURE — 81001 URINALYSIS AUTO W/SCOPE: CPT | Performed by: EMERGENCY MEDICINE

## 2017-07-26 PROCEDURE — 87186 SC STD MICRODIL/AGAR DIL: CPT | Performed by: EMERGENCY MEDICINE

## 2017-07-26 PROCEDURE — 25000132 ZZH RX MED GY IP 250 OP 250 PS 637: Performed by: OBSTETRICS & GYNECOLOGY

## 2017-07-26 PROCEDURE — 96368 THER/DIAG CONCURRENT INF: CPT

## 2017-07-26 PROCEDURE — 96366 THER/PROPH/DIAG IV INF ADDON: CPT

## 2017-07-26 PROCEDURE — 87491 CHLMYD TRACH DNA AMP PROBE: CPT | Performed by: EMERGENCY MEDICINE

## 2017-07-26 PROCEDURE — 74177 CT ABD & PELVIS W/CONTRAST: CPT

## 2017-07-26 PROCEDURE — 80053 COMPREHEN METABOLIC PANEL: CPT | Performed by: EMERGENCY MEDICINE

## 2017-07-26 PROCEDURE — 87086 URINE CULTURE/COLONY COUNT: CPT | Performed by: EMERGENCY MEDICINE

## 2017-07-26 PROCEDURE — 99221 1ST HOSP IP/OBS SF/LOW 40: CPT | Performed by: OBSTETRICS & GYNECOLOGY

## 2017-07-26 PROCEDURE — 87040 BLOOD CULTURE FOR BACTERIA: CPT | Performed by: EMERGENCY MEDICINE

## 2017-07-26 PROCEDURE — 25000128 H RX IP 250 OP 636: Performed by: EMERGENCY MEDICINE

## 2017-07-26 PROCEDURE — 96365 THER/PROPH/DIAG IV INF INIT: CPT

## 2017-07-26 PROCEDURE — 87210 SMEAR WET MOUNT SALINE/INK: CPT | Performed by: EMERGENCY MEDICINE

## 2017-07-26 PROCEDURE — 25000125 ZZHC RX 250: Performed by: OBSTETRICS & GYNECOLOGY

## 2017-07-26 PROCEDURE — 87205 SMEAR GRAM STAIN: CPT | Performed by: EMERGENCY MEDICINE

## 2017-07-26 PROCEDURE — 83605 ASSAY OF LACTIC ACID: CPT | Performed by: EMERGENCY MEDICINE

## 2017-07-26 PROCEDURE — 12000007 ZZH R&B INTERMEDIATE

## 2017-07-26 PROCEDURE — 87147 CULTURE TYPE IMMUNOLOGIC: CPT | Performed by: EMERGENCY MEDICINE

## 2017-07-26 PROCEDURE — 25000132 ZZH RX MED GY IP 250 OP 250 PS 637

## 2017-07-26 PROCEDURE — 87591 N.GONORRHOEAE DNA AMP PROB: CPT | Performed by: EMERGENCY MEDICINE

## 2017-07-26 PROCEDURE — 96375 TX/PRO/DX INJ NEW DRUG ADDON: CPT

## 2017-07-26 PROCEDURE — 25000128 H RX IP 250 OP 636: Performed by: OBSTETRICS & GYNECOLOGY

## 2017-07-26 PROCEDURE — 99285 EMERGENCY DEPT VISIT HI MDM: CPT | Mod: 25

## 2017-07-26 PROCEDURE — 85025 COMPLETE CBC W/AUTO DIFF WBC: CPT | Performed by: EMERGENCY MEDICINE

## 2017-07-26 RX ORDER — LISINOPRIL AND HYDROCHLOROTHIAZIDE 20; 25 MG/1; MG/1
1 TABLET ORAL DAILY
Status: DISCONTINUED | OUTPATIENT
Start: 2017-07-27 | End: 2017-07-28 | Stop reason: HOSPADM

## 2017-07-26 RX ORDER — IBUPROFEN 600 MG/1
TABLET, FILM COATED ORAL
Status: COMPLETED
Start: 2017-07-26 | End: 2017-07-26

## 2017-07-26 RX ORDER — ACETAMINOPHEN 325 MG/1
650 TABLET ORAL EVERY 4 HOURS PRN
Status: DISCONTINUED | OUTPATIENT
Start: 2017-07-26 | End: 2017-07-28 | Stop reason: HOSPADM

## 2017-07-26 RX ORDER — NALOXONE HYDROCHLORIDE 0.4 MG/ML
.1-.4 INJECTION, SOLUTION INTRAMUSCULAR; INTRAVENOUS; SUBCUTANEOUS
Status: DISCONTINUED | OUTPATIENT
Start: 2017-07-26 | End: 2017-07-28 | Stop reason: HOSPADM

## 2017-07-26 RX ORDER — ONDANSETRON 2 MG/ML
4 INJECTION INTRAMUSCULAR; INTRAVENOUS EVERY 6 HOURS PRN
Status: DISCONTINUED | OUTPATIENT
Start: 2017-07-26 | End: 2017-07-28 | Stop reason: HOSPADM

## 2017-07-26 RX ORDER — PROCHLORPERAZINE MALEATE 5 MG
5-10 TABLET ORAL EVERY 6 HOURS PRN
Status: DISCONTINUED | OUTPATIENT
Start: 2017-07-26 | End: 2017-07-28 | Stop reason: HOSPADM

## 2017-07-26 RX ORDER — BUPROPION HYDROCHLORIDE 150 MG/1
150 TABLET ORAL EVERY MORNING
Status: DISCONTINUED | OUTPATIENT
Start: 2017-07-27 | End: 2017-07-28 | Stop reason: HOSPADM

## 2017-07-26 RX ORDER — AMOXICILLIN 250 MG
1-2 CAPSULE ORAL 2 TIMES DAILY
Status: DISCONTINUED | OUTPATIENT
Start: 2017-07-26 | End: 2017-07-28 | Stop reason: HOSPADM

## 2017-07-26 RX ORDER — AZITHROMYCIN 250 MG/1
1000 TABLET, FILM COATED ORAL ONCE
Status: COMPLETED | OUTPATIENT
Start: 2017-07-26 | End: 2017-07-26

## 2017-07-26 RX ORDER — OXYCODONE HYDROCHLORIDE 5 MG/1
5-10 TABLET ORAL
Status: DISCONTINUED | OUTPATIENT
Start: 2017-07-26 | End: 2017-07-28 | Stop reason: HOSPADM

## 2017-07-26 RX ORDER — VANCOMYCIN HYDROCHLORIDE 1 G/200ML
1000 INJECTION, SOLUTION INTRAVENOUS EVERY 12 HOURS
Status: DISCONTINUED | OUTPATIENT
Start: 2017-07-27 | End: 2017-07-28 | Stop reason: HOSPADM

## 2017-07-26 RX ORDER — ONDANSETRON 2 MG/ML
4 INJECTION INTRAMUSCULAR; INTRAVENOUS EVERY 30 MIN PRN
Status: DISCONTINUED | OUTPATIENT
Start: 2017-07-26 | End: 2017-07-27

## 2017-07-26 RX ORDER — SODIUM CHLORIDE 9 MG/ML
INJECTION, SOLUTION INTRAVENOUS CONTINUOUS
Status: DISCONTINUED | OUTPATIENT
Start: 2017-07-26 | End: 2017-07-28 | Stop reason: HOSPADM

## 2017-07-26 RX ORDER — ALBUTEROL SULFATE 90 UG/1
2 AEROSOL, METERED RESPIRATORY (INHALATION) EVERY 4 HOURS PRN
Status: DISCONTINUED | OUTPATIENT
Start: 2017-07-26 | End: 2017-07-28 | Stop reason: HOSPADM

## 2017-07-26 RX ORDER — HYDROMORPHONE HYDROCHLORIDE 1 MG/ML
0.5 INJECTION, SOLUTION INTRAMUSCULAR; INTRAVENOUS; SUBCUTANEOUS
Status: COMPLETED | OUTPATIENT
Start: 2017-07-26 | End: 2017-07-27

## 2017-07-26 RX ORDER — IBUPROFEN 600 MG/1
600 TABLET, FILM COATED ORAL EVERY 6 HOURS PRN
Status: DISCONTINUED | OUTPATIENT
Start: 2017-07-26 | End: 2017-07-28 | Stop reason: HOSPADM

## 2017-07-26 RX ORDER — CALCIUM CARBONATE 500 MG/1
500-1000 TABLET, CHEWABLE ORAL 4 TIMES DAILY PRN
Status: DISCONTINUED | OUTPATIENT
Start: 2017-07-26 | End: 2017-07-28 | Stop reason: HOSPADM

## 2017-07-26 RX ORDER — ATORVASTATIN CALCIUM 20 MG/1
20 TABLET, FILM COATED ORAL DAILY
Status: DISCONTINUED | OUTPATIENT
Start: 2017-07-27 | End: 2017-07-28 | Stop reason: HOSPADM

## 2017-07-26 RX ORDER — SODIUM CHLORIDE, SODIUM LACTATE, POTASSIUM CHLORIDE, CALCIUM CHLORIDE 600; 310; 30; 20 MG/100ML; MG/100ML; MG/100ML; MG/100ML
INJECTION, SOLUTION INTRAVENOUS CONTINUOUS
Status: DISCONTINUED | OUTPATIENT
Start: 2017-07-26 | End: 2017-07-28 | Stop reason: HOSPADM

## 2017-07-26 RX ORDER — PROCHLORPERAZINE 25 MG
25 SUPPOSITORY, RECTAL RECTAL EVERY 12 HOURS PRN
Status: DISCONTINUED | OUTPATIENT
Start: 2017-07-26 | End: 2017-07-28 | Stop reason: HOSPADM

## 2017-07-26 RX ORDER — METOCLOPRAMIDE HYDROCHLORIDE 5 MG/ML
10 INJECTION INTRAMUSCULAR; INTRAVENOUS EVERY 6 HOURS PRN
Status: DISCONTINUED | OUTPATIENT
Start: 2017-07-26 | End: 2017-07-28 | Stop reason: HOSPADM

## 2017-07-26 RX ORDER — METOCLOPRAMIDE 10 MG/1
10 TABLET ORAL EVERY 6 HOURS PRN
Status: DISCONTINUED | OUTPATIENT
Start: 2017-07-26 | End: 2017-07-28 | Stop reason: HOSPADM

## 2017-07-26 RX ORDER — ONDANSETRON 4 MG/1
4 TABLET, ORALLY DISINTEGRATING ORAL EVERY 6 HOURS PRN
Status: DISCONTINUED | OUTPATIENT
Start: 2017-07-26 | End: 2017-07-28 | Stop reason: HOSPADM

## 2017-07-26 RX ORDER — SODIUM CHLORIDE 9 MG/ML
1000 INJECTION, SOLUTION INTRAVENOUS CONTINUOUS
Status: DISCONTINUED | OUTPATIENT
Start: 2017-07-26 | End: 2017-07-28 | Stop reason: HOSPADM

## 2017-07-26 RX ORDER — IOPAMIDOL 755 MG/ML
500 INJECTION, SOLUTION INTRAVASCULAR ONCE
Status: COMPLETED | OUTPATIENT
Start: 2017-07-26 | End: 2017-07-26

## 2017-07-26 RX ADMIN — Medication 0.5 MG: at 19:45

## 2017-07-26 RX ADMIN — ONDANSETRON 4 MG: 4 TABLET, ORALLY DISINTEGRATING ORAL at 23:54

## 2017-07-26 RX ADMIN — AZITHROMYCIN 1000 MG: 250 TABLET, FILM COATED ORAL at 22:23

## 2017-07-26 RX ADMIN — IBUPROFEN 600 MG: 600 TABLET ORAL at 18:04

## 2017-07-26 RX ADMIN — Medication 0.5 MG: at 18:05

## 2017-07-26 RX ADMIN — SODIUM CHLORIDE, POTASSIUM CHLORIDE, SODIUM LACTATE AND CALCIUM CHLORIDE 2448 ML: 600; 310; 30; 20 INJECTION, SOLUTION INTRAVENOUS at 17:01

## 2017-07-26 RX ADMIN — SODIUM CHLORIDE, POTASSIUM CHLORIDE, SODIUM LACTATE AND CALCIUM CHLORIDE: 600; 310; 30; 20 INJECTION, SOLUTION INTRAVENOUS at 20:50

## 2017-07-26 RX ADMIN — VANCOMYCIN HYDROCHLORIDE 1500 MG: 10 INJECTION, POWDER, LYOPHILIZED, FOR SOLUTION INTRAVENOUS at 18:21

## 2017-07-26 RX ADMIN — SODIUM CHLORIDE: 9 INJECTION, SOLUTION INTRAVENOUS at 22:25

## 2017-07-26 RX ADMIN — IOPAMIDOL 91 ML: 755 INJECTION, SOLUTION INTRAVENOUS at 19:25

## 2017-07-26 RX ADMIN — TAZOBACTAM SODIUM AND PIPERACILLIN SODIUM 4.5 G: 500; 4 INJECTION, SOLUTION INTRAVENOUS at 23:54

## 2017-07-26 RX ADMIN — SODIUM CHLORIDE 63 ML: 9 INJECTION, SOLUTION INTRAVENOUS at 19:25

## 2017-07-26 RX ADMIN — TAZOBACTAM SODIUM AND PIPERACILLIN SODIUM 4.5 G: 500; 4 INJECTION, SOLUTION INTRAVENOUS at 18:21

## 2017-07-26 RX ADMIN — ONDANSETRON 4 MG: 2 INJECTION INTRAMUSCULAR; INTRAVENOUS at 16:59

## 2017-07-26 ASSESSMENT — ENCOUNTER SYMPTOMS
ABDOMINAL PAIN: 1
SHORTNESS OF BREATH: 0
FEVER: 1
VOMITING: 0
CONSTIPATION: 0
DIARRHEA: 0
NAUSEA: 1

## 2017-07-26 NOTE — TELEPHONE ENCOUNTER
"Pt calling had [facility] with Dr. Painting on 7/14/17 and for the past three days states that their is an odor to her discharge, \"it's smelly.\" Pt aware that can have the pink/mucous/bloody discharge. Today has been running a low grade temp 100.2 and c/o chills and nausea, sick to her stomach. Has been passing clots, beny in size and cramping pain was so bad today that she \"doubled over\" because she was lifting something and had to get out the heating pad again and is super uncomfortable. With both her C-sections she got Staph infections. No MD appt's today available. Recommended going into today to an Urgent Care, or an Urgency Room or ER to make sure doesn't have infection. Pt voiced understanding an by the sounds of it was going to go into a [facility].      Routing to Dr. Painting so he's aware.  "

## 2017-07-26 NOTE — IP AVS SNAPSHOT
MRN:[id number]                      After Visit Summary   7/26/2017    Modesta Bravo    MRN: [id number]           Thank you!     Thank you for choosing [facility] for your care. Our goal is always to provide you with excellent care. Hearing back from our patients is one way we can continue to improve our services. Please take a few minutes to complete the written survey that you may receive in the mail after you visit. If you would like to speak to someone directly about your visit please contact Patient Relations at [phone]. Thank you!          Patient Information     Date Of Birth          1965        Designated Caregiver       Most Recent Value    Caregiver    Will someone help with your care after discharge? no      About your hospital stay     You were admitted on:  July 26, 2017 You last received care in the:  [place] [address] [facility]    You were discharged on:  July 28, 2017       Who to Call     For medical emergencies, please call 911.  For non-urgent questions about your medical care, please call your primary care provider or clinic, [phone]          Attending Provider     Provider Specialty    Yumiko Mercado MD Emergency Medicine    Damaso, Poornima Segal MD Emergency Medicine    Kera Cristobal MD OB/Gyn       Primary Care Provider Office Phone # Fax #    Toro Hugo [phone]      Follow-up Appointments     Follow-up and recommended labs and tests        Follow up with gyn in one week                  Further instructions from your care team       Follow up with primary MD and OB/GYN as instructed.   Call MD :  Fever of 101.5  Nausea not controlled with medication.    Pending Results     Date and Time Order Name Status Description    7/28/2017 0730 Clostridium difficile toxin B PCR In process     7/26/2017 1827 Blood culture Preliminary     7/26/2017 1801 Wound Culture Aerobic Bacterial Preliminary     7/26/2017  "1645 Blood culture Preliminary             Statement of Approval     Ordered          07/28/17 0952  I have reviewed and agree with all the recommendations and orders detailed in this document.  EFFECTIVE NOW     Approved and electronically signed by:  London Prakash MD             Admission Information     Date & Time Provider Department Dept. Phone    7/26/2017 Kera Cristobal MD [place] [id number] [facility] [phone]      Your Vitals Were     Blood Pressure Pulse Temperature Respirations Height Weight    138/79 88 96.9  F (36.1  C) (Oral) 16 1.727 m (5' 8\") 99.8 kg (220 lb)    Last Period Pulse Oximetry BMI (Body Mass Index)             07/07/2017 (Exact Date) 95% 33.45 kg/m2         MyChart Information     [employer] gives you secure access to your electronic health record. If you see a primary care provider, you can also send messages to your care team and make appointments. If you have questions, please call your primary care clinic.  If you do not have a primary care provider, please call [phone] and they will assist you.        Care EveryWhere ID     This is your Care EveryWhere ID. This could be used by other organizations to access your [facility] medical records  [id number]        Equal Access to Services     ALEJO PANTOJA AH: Buddy Kenyon, wafadi larose, qalongta kaalmada nubia, penelope barnes. So [facility] [phone].    ATENCIÓN: Si habla español, tiene a soler disposición servicios gratuitos de asistencia lingüística. Llame al [phone].    We comply with applicable federal civil rights laws and [place] laws. We do not discriminate on the basis of race, color, national origin, age, disability sex, sexual orientation or gender identity.               Review of your medicines      START taking        Dose / Directions    cephALEXin 500 MG capsule   Commonly known as:  KEFLEX        Dose:  500 mg   Take 1 capsule (500 mg) by mouth 4 times daily "   Quantity:  28 capsule   Refills:  0       metroNIDAZOLE 500 MG tablet   Commonly known as:  FLAGYL        Dose:  500 mg   Take 1 tablet (500 mg) by mouth 3 times daily   Quantity:  21 tablet   Refills:  0       ondansetron 4 MG ODT tab   Commonly known as:  ZOFRAN-ODT        Dose:  4 mg   Take 1 tablet (4 mg) by mouth every 6 hours as needed for nausea or vomiting   Quantity:  30 tablet   Refills:  1         CONTINUE these medicines which may have CHANGED, or have new prescriptions. If we are uncertain of the size of tablets/capsules you have at home, strength may be listed as something that might have changed.        Dose / Directions    albuterol 108 (90 BASE) MCG/ACT Inhaler   Commonly known as:  PROAIR HFA/PROVENTIL HFA/VENTOLIN HFA   This may have changed:  Another medication with the same name was removed. Continue taking this medication, and follow the directions you see here.   Used for:  Asthma exacerbation, Cough        Dose:  2 puff   Inhale 2 puffs into the lungs every 4 hours as needed for shortness of breath / dyspnea or wheezing /chest tightness/cough   Quantity:  1 Inhaler   Refills:  1         CONTINUE these medicines which have NOT CHANGED        Dose / Directions    atorvastatin 20 MG tablet   Commonly known as:  LIPITOR   Used for:  Hyperlipidemia LDL goal <100        Dose:  20 mg   Take 1 tablet (20 mg) by mouth daily   Quantity:  90 tablet   Refills:  3       BABY ASPIRIN PO   Used for:  Essential hypertension with goal blood pressure less than 140/90        Dose:  81 mg   Take 81 mg by mouth daily   Refills:  0       benzonatate 100 MG capsule   Commonly known as:  TESSALON   Used for:  Cough        Dose:  200 mg   Take 2 capsules (200 mg) by mouth 3 times daily as needed   Quantity:  42 capsule   Refills:  3       buPROPion 150 MG 24 hr tablet   Commonly known as:  WELLBUTRIN XL   Used for:  Sexual dysfunction        Dose:  150 mg   Take 1 tablet (150 mg) by mouth every morning   Quantity:   30 tablet   Refills:  0       lisinopril-hydrochlorothiazide 20-25 MG per tablet   Commonly known as:  PRINZIDE/ZESTORETIC   Used for:  Essential hypertension with goal blood pressure less than 140/90        Dose:  1 tablet   Take 1 tablet by mouth daily   Quantity:  90 tablet   Refills:  3       sertraline 50 MG tablet   Commonly known as:  ZOLOFT   Used for:  Anxiety        Dose:  50 mg   Take 1 tablet (50 mg) by mouth daily   Quantity:  90 tablet   Refills:  3       Vitamin D (Cholecalciferol) 1000 UNITS Tabs        Dose:  2000 Units   Take 2,000 Units by mouth daily   Refills:  0         STOP taking     oxyCODONE 5 MG IR tablet   Commonly known as:  ROXICODONE                Where to get your medicines      Some of these will need a paper prescription and others can be bought over the counter. Ask your nurse if you have questions.     Bring a paper prescription for each of these medications     cephALEXin 500 MG capsule    metroNIDAZOLE 500 MG tablet    ondansetron 4 MG ODT tab                Protect others around you: Learn how to safely use, store and throw away your medicines at www.disposemymeds.org.             Medication List: This is a list of all your medications and when to take them. Check marks below indicate your daily home schedule. Keep this list as a reference.      Medications           Morning Afternoon Evening Bedtime As Needed    albuterol 108 (90 BASE) MCG/ACT Inhaler   Commonly known as:  PROAIR HFA/PROVENTIL HFA/VENTOLIN HFA   Inhale 2 puffs into the lungs every 4 hours as needed for shortness of breath / dyspnea or wheezing /chest tightness/cough                                atorvastatin 20 MG tablet   Commonly known as:  LIPITOR   Take 1 tablet (20 mg) by mouth daily                                BABY ASPIRIN PO   Take 81 mg by mouth daily                                benzonatate 100 MG capsule   Commonly known as:  TESSALON   Take 2 capsules (200 mg) by mouth 3 times daily as needed                                 buPROPion 150 MG 24 hr tablet   Commonly known as:  WELLBUTRIN XL   Take 1 tablet (150 mg) by mouth every morning                                cephALEXin 500 MG capsule   Commonly known as:  KEFLEX   Take 1 capsule (500 mg) by mouth 4 times daily                                lisinopril-hydrochlorothiazide 20-25 MG per tablet   Commonly known as:  PRINZIDE/ZESTORETIC   Take 1 tablet by mouth daily                                metroNIDAZOLE 500 MG tablet   Commonly known as:  FLAGYL   Take 1 tablet (500 mg) by mouth 3 times daily                                ondansetron 4 MG ODT tab   Commonly known as:  ZOFRAN-ODT   Take 1 tablet (4 mg) by mouth every 6 hours as needed for nausea or vomiting   Last time this was given:  4 mg on 7/26/2017 11:54 PM                                sertraline 50 MG tablet   Commonly known as:  ZOLOFT   Take 1 tablet (50 mg) by mouth daily                                Vitamin D (Cholecalciferol) 1000 UNITS Tabs   Take 2,000 Units by mouth daily                                          More Information        Nonobstetric Endometritis  You have endometritis. This is an infection of the inside lining of the uterus. There are many possible causes. It may occur after a procedure on your uterus, such as a biopsy or placement of an IUD. It may be caused by a vaginal infection that spread. It may also be caused by a sexually transmitted infection (STI). Testing will be done to look for the cause of the infection so the best treatment can be done. It is not always possible to find the exact cause.    Symptoms of endometritis include:    Fever    Feeling ill    Pain in the lower belly    Swelling of the belly    Bleeding from the vagina that s not during your normal period    Bad-smelling fluid from the vagina    Pain when urinating    Pain during sex  In some cases, an infection called pelvic inflammatory disease (PID) also forms. This involves the ovaries  and fallopian tubes. If not treated, PID may lead to infertility. This means you may not be able to get pregnant. It can also lead to full-body infection (sepsis). If you have endometritis, you may need further testing to rule out PID.  Home care  You will be prescribed antibiotic medicines to take for the infection. Take these as directed by your healthcare provider. Don t stop taking the medicine, even if you feel better. Take all of the medicine until it is gone. In general, your symptoms should get better within 2 to 3 days of starting the antibiotic. If you don t get better within 3 days of starting this treatment, call your healthcare provider.  General care  Plan to rest at home for the rest of the day.  Until you have taken all the medicine and your symptoms are gone:    Don t have sex    Don t put anything into your vagina, including tampons    Don t douche. Douching is generally not recommended at any time.    Don t take a tub bath, use a hot tub, or swim. Showering is fine.  Follow-up care  Follow up with your healthcare provider, or as advised. If an STI caused the infection, your recent sexual partners must be contacted and treated as well.  Call 911  Call 911 if any of these occur:    Pain in your chest, arm, or jaw    Shortness of breath  When to seek medical advice  Call your healthcare provider right away if any of the following occur:    Fever of 100.4 F (38 C) or higher, or as directed by your healthcare provider    Symptoms get worse or you have new symptoms    Nausea or vomiting    You don t get better within 3 days of starting treatment  Date Last Reviewed: 12/1/2016 2000-2017 [employer]. [address] [place] [address], [place] [address]. All rights reserved. This information is not intended as a substitute for professional medical care. Always follow your healthcare professional's instructions.              About C. diff Infection  For Patients, Family and Visitors  What is C. diff  (clostridium difficile)?  C. diff are germs (bacteria). These germs can live in the guts of healthy people. Antibiotic medicine can change the balance of germs in the gut, causing C. diff infection and diarrhea (dye-uh-MACARIO-hardy).  You can also get C. diff infection during a hospital stay, after surgery, or if you have a weak immune system or IBD (inflammatory bowel disease).  What are the symptoms?  If you have these symptoms, your doctor will ask for a stool (poop) sample for testing:    Diarrhea (loose, watery stools)    Belly pain, tenderness and cramping    Fever  How does it spread?  C. diff leaves your body through your stool. You can get infected when :  1. You touch a surface that has this germ, then  2. You touch food or something else that you put in your mouth.  Here's how you, your family and visitors can help prevent the infection from spreading:     Wash hands often, especially in the hospital, after using the bathroom and before you eat.    Use antibiotics only when you need them. Don't ask for them if your doctor says you have a virus.    If you take antibiotics, follow the directions. Finish all the pills, even if you feel better.    If you have C. diff infection, try to use a separate restroom until you are well.    At home, clean countertops, sinks, faucets, bathroom doorknobs and toilets often. Use warm water with soap or cleaning products with bleach. (Don't use pure bleach. It's too strong.)    In the hospital, your care team should wear gloves and gowns. They should clean their hands before touching you and before leaving the room. If they don't, please remind them.  Hand washing  For this illness, soap and water works better than hand .    Wash hands with warm water and plenty of soap. Wash for 15 to 20 seconds.    Clean under nails, between fingers and up the wrists.    Rinse hands, letting water run down your fingers.    Dry hands well. Use a paper towel to turn off the faucet and  open the door.   How is it treated?  Your doctor may change your antibiotics and give you medicine for diarrhea. Don't take other anti-diarrhea medicines. They will make things worse.  You may get extra fluids through an IV (small tube in the arm or hand).  Sometimes, the infection will come back. If you have symptoms, please call your doctor.   For informational purposes only. Not to replace the advice of your health care provider. Copyright   2014 [employer]. All rights reserved. [employer] [id number] - REV 05/16.

## 2017-07-26 NOTE — ED PROVIDER NOTES
History     Chief Complaint:  Fever and Nausea    HPI   Modesta Bravo is a 51 year old female who presents to the emergency department today for evaluation of abdominal pain and a fever. 13 days ago she had a uterine ablation for generalized heavy periods. Since then she has had daily heavy bleeding, but she was aware what happened. This bleeding increased approximately 5 days ago as did her lower abdominal pain. This morning she woke up with a fever as well as significant lower abdominal pain. She knows that over the past several days she has had foul smelling vaginal discharge in addition to the vaginal bleeding. Today she also developed nausea but no vomiting. Her fever was above 101 at home. She's not had diarrhea or constipation. She has no shortness of breath. Of note she did have MRSA infections after both previous c-sections that were 19 and 21 years ago.    Allergies:  No Known Drug Allergies    Medications:    Albuterol  Lipitor  Zoloft  Lisinopril-hydrochlorothiazide   Wellbutrin  Cholecalciferol  Roxicodone  Tessalon  Albuterol  Aspirin     Past Medical History:    Anxiety   Cough variant asthma   Family history of breast cancer   Hyperlipidaemia   Hyperparathyroidism    Hypertension   Menorrhagia   Vitamin deficiency    Past Surgical History:    GYN surgery    Family History:    Mother: Hyperlipidemia, Hypertension, Diabetes,   Father: Hyperlipidemia, Diabetes, Hypertension, CHF  Sister: Breast Cancer   Maternal Grandmother: Breast Cancer    Social History:  Smoking Status: Never Smoker  Smokeless Tobacco: Never Used  Alcohol Use: Positive  Marital Status:   [2]     Review of Systems   Constitutional: Positive for fever.   Respiratory: Negative for shortness of breath.    Gastrointestinal: Positive for abdominal pain (Lower) and nausea. Negative for constipation, diarrhea and vomiting.   Genitourinary: Positive for vaginal bleeding and vaginal discharge.   All other systems reviewed and are  "negative.    Physical Exam   Vitals:  Patient Vitals for the past 24 hrs:   BP Temp Temp src Heart Rate Resp SpO2 Height Weight   07/26/17 2050 - 99  F (37.2  C) Oral - - - - -   07/26/17 2040 130/78 - - 106 - 92 % - -   07/26/17 2010 137/80 - - 111 - 92 % - -   07/26/17 1955 127/79 - - 107 - 90 % - -   07/26/17 1950 - 100.1  F (37.8  C) Oral - - - - -   07/26/17 1942 - - - - - 93 % - -   07/26/17 1941 131/82 - - - - - - -   07/26/17 1840 144/82 - - 118 - 93 % - -   07/26/17 1825 145/82 - - 118 - 92 % - -   07/26/17 1803 146/83 - - 114 - 96 % - -   07/26/17 1710 142/87 - - 107 - 95 % - -   07/26/17 1705 - 100.2  F (37.9  C) Oral - - - - -   07/26/17 1704 139/84 - - 108 - 97 % - -   07/26/17 1636 164/86 100.1  F (37.8  C) Oral 111 16 99 % - -   07/26/17 1622 (!) 160/105 99.5  F (37.5  C) Oral 118 20 99 % 1.702 m (5' 7\") 81.6 kg (180 lb)      Physical Exam  General/Appearance: appears stated age, well-groomed, appears comfortable  Eyes: EOMI, no scleral injection, no icterus  ENT: MMM  Neck: supple, nl ROM, no stiffness  Cardiovascular: Tachycardic but regular, nl S1S2, no m/r/g, 2+ pulses in all 4 extremities, cap refill <2sec  Respiratory: CTAB, good air movement throughout, no wheezes/rhonchi/rales, no increased WOB, no retractions  Back: no lesions  GI: abd soft, non-distended, significant tenderness to palpation to suprapubic/left adnexal/periumbilical region with involuntary guarding, no HSM, no rebound, nl BS  MSK: CRANE, good tone, no bony abnormality  Skin: warm and well-perfused, no rash, no edema, no ecchymosis, nl turgor  Neuro: GCS 15, alert and oriented, no gross focal neuro deficits  Psych: interacts appropriately  Heme: no petechia, no purpura, no active bleeding    Emergency Department Course     Imaging:  Radiology findings were communicated with the patient who voiced understanding of the findings.    CT Abdomen Pelvis w Contrast  1. Poorly defined heterogeneous 4.5 cm uterine mass, " indeterminate  though likely a fibroid. Given the patient's clinical history, an  inflammatory process relating to the prior procedure cannot be  completely excluded. Recommend follow-up as clinically indicated.  2. Mild nonspecific retroperitoneal and pelvic adenopathy of uncertain  significance and etiology. Consider short-term follow-up imaging to  document stability.  3. No other acute findings.  4. Fatty liver.  Reading per radiology    US Pelvic Complete w Transvaginal  1. Visualization limited by a 4.3 cm fibroid in the uterus.  2. Endometrium not well seen.  3. Neither ovary is identified. No suspicious adnexal masses or free  fluid.  Reading per radiology    Laboratory:  Laboratory findings were communicated with the patient who voiced understanding of the findings.    Wet Prep (Specimen: Vagina): Moderate PMNs seen, No trichomonas, No yeast seen, No clue cells seen  Neisseria Gonorrhea PCR: Pending  Chlamydia Trachomatis PCR: Pending  Wound Culture Aerobic Bacterial: Pending  Gram Stain: Pending  Blood Culture: Pending x 2   UA with Microscopic: Blood: Moderate (A), Leukocyte Esterase: Moderate (A), WBC/HPF: 8 (H), RBC/HPF: 8 (H), Mucous: Present (A)  Urine Culture Aerobic Bacterial: Pending  CBC: WBC 20.4 (H), HGB 12.3,   CMP: ALT 60 (H) o/w WNL (Creatinine 0.77)  Lactic Acid Whole Blood: 1.9    Interventions:  1659 Zofran 4 mg IV  1701 LR 2448 ml IV  1804 Ibuprofen 600 mg PO  1805 Dilaudid 0.5 mg IV   1821 Vancocin 1500 mg IV  1821 Zosyn 4.5 gm IV  1945 Dilaudid 0.5 mg IV   2050  ml/hr IV    Emergency Department Course:  Nursing notes and vitals reviewed.  I performed an exam of the patient as documented above.   The patient provided a urine sample here in the emergency department. This was sent for laboratory testing, findings above.  IV was inserted and blood was drawn for laboratory testing, results above.  The patient was sent for a US Pelvic Complete w Transvaginal and a CT Abdomen  Pelvis w Contrast while in the emergency department, results above.   2007 I spoke with Dr. Danay Hare of the [facility] regarding patient's presentation, findings, and plan of care. She reports that does not have privileges here at [facility].   2017 I spoke with Dr. Kera Cristobal of OBGYN from [facility] regarding patient's presentation, findings, and plan of care. Dr. Cristobal will accept the patient into her care.   I discussed the treatment plan with the patient. They expressed understanding of this plan and consented to admission. I discussed the patient with Dr. Kera Cristobal, who will admit the patient to a monitored bed for further evaluation and treatment.  I personally reviewed the laboratory and imaging results with the patient and answered all related questions prior to admission.  Impression & Plan      Medical Decision Making:  Modesta Bravo is a 51 year old female who is thirteen days status post uterine ablation who presents with endometritis. She has had foul smelling vaginal discharge for five days followed by the onset of fever and lower abdominal pain today. She is septic with tachycardia and an elevated white count. Of note, though, her lactate is within normal limits. She has zenaida purulent discharge coming from the cervix. Her ultrasound and CT do not suggest an overt perforation, although this is still within the differential. She has been started on Vancocin and Zosyn. [facility] will be admitting her and so far they agree with the antibiotic choices.     Diagnosis:    ICD-10-CM    1. Endometritis N71.9      Disposition:   The patient is admitted into the care of Dr. Kera Cristobal.    Scribe Disclosure:  I, Fady Ellis, am serving as a scribe at 6:11 PM on 7/26/2017 to document services personally performed by Poornima Petit MD, based on my observations and the provider's statements to me.    [facility] EMERGENCY DEPARTMENT       Damaso  Poornima Segal MD  08/01/17 2024

## 2017-07-26 NOTE — IP AVS SNAPSHOT
[place] [id number] [facility]    [address]    [facility] [address]    Phone:  [phone]    Fax:  [phone]                                       After Visit Summary   7/26/2017    Modesta Bravo    MRN: [id number]           After Visit Summary Signature Page     I have received my discharge instructions, and my questions have been answered. I have discussed any challenges I see with this plan with the nurse or doctor.    ..........................................................................................................................................  Patient/Patient Representative Signature      ..........................................................................................................................................  Patient Representative Print Name and Relationship to Patient    ..................................................               ................................................  Date                                            Time    ..........................................................................................................................................  Reviewed by Signature/Title    ...................................................              ..............................................  Date                                                            Time

## 2017-07-26 NOTE — ED NOTES
Pt had ablation 13 days ago and is now having lower abdominal pain.  She is very nauseated and reports foul vaginal drainage.

## 2017-07-27 ENCOUNTER — TELEPHONE (OUTPATIENT)
Dept: OBGYN | Facility: CLINIC | Age: 52
End: 2017-07-27

## 2017-07-27 LAB
ANION GAP SERPL CALCULATED.3IONS-SCNC: 8 MMOL/L (ref 3–14)
BASOPHILS # BLD AUTO: 0 10E9/L (ref 0–0.2)
BASOPHILS NFR BLD AUTO: 0.2 %
BUN SERPL-MCNC: 11 MG/DL (ref 7–30)
C TRACH DNA SPEC QL NAA+PROBE: NORMAL
CALCIUM SERPL-MCNC: 8.4 MG/DL (ref 8.5–10.1)
CHLORIDE SERPL-SCNC: 103 MMOL/L (ref 94–109)
CO2 SERPL-SCNC: 25 MMOL/L (ref 20–32)
CREAT SERPL-MCNC: 0.79 MG/DL (ref 0.52–1.04)
DIFFERENTIAL METHOD BLD: ABNORMAL
EOSINOPHIL # BLD AUTO: 0 10E9/L (ref 0–0.7)
EOSINOPHIL NFR BLD AUTO: 0.1 %
ERYTHROCYTE [DISTWIDTH] IN BLOOD BY AUTOMATED COUNT: 12.6 % (ref 10–15)
GFR SERPL CREATININE-BSD FRML MDRD: 77 ML/MIN/1.7M2
GLUCOSE SERPL-MCNC: 121 MG/DL (ref 70–99)
GRAM STN SPEC: ABNORMAL
HCT VFR BLD AUTO: 32.3 % (ref 35–47)
HGB BLD-MCNC: 10.7 G/DL (ref 11.7–15.7)
IMM GRANULOCYTES # BLD: 0.1 10E9/L (ref 0–0.4)
IMM GRANULOCYTES NFR BLD: 0.6 %
LACTATE BLD-SCNC: 1.3 MMOL/L (ref 0.7–2.1)
LYMPHOCYTES # BLD AUTO: 0.5 10E9/L (ref 0.8–5.3)
LYMPHOCYTES NFR BLD AUTO: 3.7 %
MCH RBC QN AUTO: 32.6 PG (ref 26.5–33)
MCHC RBC AUTO-ENTMCNC: 33.1 G/DL (ref 31.5–36.5)
MCV RBC AUTO: 99 FL (ref 78–100)
MICRO REPORT STATUS: ABNORMAL
MONOCYTES # BLD AUTO: 0.5 10E9/L (ref 0–1.3)
MONOCYTES NFR BLD AUTO: 3.8 %
N GONORRHOEA DNA SPEC QL NAA+PROBE: NORMAL
NEUTROPHILS # BLD AUTO: 11.9 10E9/L (ref 1.6–8.3)
NEUTROPHILS NFR BLD AUTO: 91.6 %
NRBC # BLD AUTO: 0 10*3/UL
NRBC BLD AUTO-RTO: 0 /100
PLATELET # BLD AUTO: 176 10E9/L (ref 150–450)
POTASSIUM SERPL-SCNC: 3.9 MMOL/L (ref 3.4–5.3)
RBC # BLD AUTO: 3.28 10E12/L (ref 3.8–5.2)
SODIUM SERPL-SCNC: 136 MMOL/L (ref 133–144)
SPECIMEN SOURCE: ABNORMAL
SPECIMEN SOURCE: NORMAL
SPECIMEN SOURCE: NORMAL
WBC # BLD AUTO: 13 10E9/L (ref 4–11)

## 2017-07-27 PROCEDURE — 36415 COLL VENOUS BLD VENIPUNCTURE: CPT | Performed by: OBSTETRICS & GYNECOLOGY

## 2017-07-27 PROCEDURE — 12000007 ZZH R&B INTERMEDIATE

## 2017-07-27 PROCEDURE — 25000125 ZZHC RX 250: Performed by: OBSTETRICS & GYNECOLOGY

## 2017-07-27 PROCEDURE — 25000132 ZZH RX MED GY IP 250 OP 250 PS 637: Performed by: OBSTETRICS & GYNECOLOGY

## 2017-07-27 PROCEDURE — 80048 BASIC METABOLIC PNL TOTAL CA: CPT | Performed by: OBSTETRICS & GYNECOLOGY

## 2017-07-27 PROCEDURE — 25000128 H RX IP 250 OP 636: Performed by: EMERGENCY MEDICINE

## 2017-07-27 PROCEDURE — 83605 ASSAY OF LACTIC ACID: CPT | Performed by: OBSTETRICS & GYNECOLOGY

## 2017-07-27 PROCEDURE — 99231 SBSQ HOSP IP/OBS SF/LOW 25: CPT | Performed by: OBSTETRICS & GYNECOLOGY

## 2017-07-27 PROCEDURE — 25000128 H RX IP 250 OP 636: Performed by: OBSTETRICS & GYNECOLOGY

## 2017-07-27 PROCEDURE — 85025 COMPLETE CBC W/AUTO DIFF WBC: CPT | Performed by: OBSTETRICS & GYNECOLOGY

## 2017-07-27 RX ORDER — HYDROMORPHONE HCL/0.9% NACL/PF 0.2MG/0.2
0.2 SYRINGE (ML) INTRAVENOUS
Status: DISCONTINUED | OUTPATIENT
Start: 2017-07-27 | End: 2017-07-28 | Stop reason: HOSPADM

## 2017-07-27 RX ORDER — ACETAMINOPHEN 10 MG/ML
1000 INJECTION, SOLUTION INTRAVENOUS ONCE
Status: COMPLETED | OUTPATIENT
Start: 2017-07-27 | End: 2017-07-27

## 2017-07-27 RX ORDER — KETOROLAC TROMETHAMINE 30 MG/ML
30 INJECTION, SOLUTION INTRAMUSCULAR; INTRAVENOUS EVERY 6 HOURS PRN
Status: DISCONTINUED | OUTPATIENT
Start: 2017-07-27 | End: 2017-07-28 | Stop reason: HOSPADM

## 2017-07-27 RX ADMIN — METRONIDAZOLE 500 MG: 500 INJECTION, SOLUTION INTRAVENOUS at 10:40

## 2017-07-27 RX ADMIN — VANCOMYCIN HYDROCHLORIDE 1000 MG: 1 INJECTION, SOLUTION INTRAVENOUS at 07:02

## 2017-07-27 RX ADMIN — ACETAMINOPHEN 650 MG: 325 TABLET, FILM COATED ORAL at 19:38

## 2017-07-27 RX ADMIN — KETOROLAC TROMETHAMINE 30 MG: 30 INJECTION, SOLUTION INTRAMUSCULAR at 09:59

## 2017-07-27 RX ADMIN — PROCHLORPERAZINE EDISYLATE 10 MG: 5 INJECTION INTRAMUSCULAR; INTRAVENOUS at 17:48

## 2017-07-27 RX ADMIN — TAZOBACTAM SODIUM AND PIPERACILLIN SODIUM 4.5 G: 500; 4 INJECTION, SOLUTION INTRAVENOUS at 17:34

## 2017-07-27 RX ADMIN — TAZOBACTAM SODIUM AND PIPERACILLIN SODIUM 4.5 G: 500; 4 INJECTION, SOLUTION INTRAVENOUS at 11:55

## 2017-07-27 RX ADMIN — METRONIDAZOLE 500 MG: 500 INJECTION, SOLUTION INTRAVENOUS at 21:36

## 2017-07-27 RX ADMIN — PROCHLORPERAZINE EDISYLATE 5 MG: 5 INJECTION INTRAMUSCULAR; INTRAVENOUS at 01:48

## 2017-07-27 RX ADMIN — TAZOBACTAM SODIUM AND PIPERACILLIN SODIUM 4.5 G: 500; 4 INJECTION, SOLUTION INTRAVENOUS at 06:01

## 2017-07-27 RX ADMIN — PROCHLORPERAZINE EDISYLATE 10 MG: 5 INJECTION INTRAMUSCULAR; INTRAVENOUS at 09:50

## 2017-07-27 RX ADMIN — METRONIDAZOLE 500 MG: 500 INJECTION, SOLUTION INTRAVENOUS at 15:47

## 2017-07-27 RX ADMIN — ONDANSETRON 4 MG: 2 INJECTION INTRAMUSCULAR; INTRAVENOUS at 05:53

## 2017-07-27 RX ADMIN — ONDANSETRON 4 MG: 2 INJECTION INTRAMUSCULAR; INTRAVENOUS at 21:23

## 2017-07-27 RX ADMIN — VANCOMYCIN HYDROCHLORIDE 1000 MG: 1 INJECTION, SOLUTION INTRAVENOUS at 18:35

## 2017-07-27 RX ADMIN — ACETAMINOPHEN 1000 MG: 10 INJECTION, SOLUTION INTRAVENOUS at 11:55

## 2017-07-27 RX ADMIN — KETOROLAC TROMETHAMINE 30 MG: 30 INJECTION, SOLUTION INTRAMUSCULAR at 17:58

## 2017-07-27 RX ADMIN — Medication 0.5 MG: at 06:30

## 2017-07-27 RX ADMIN — ONDANSETRON 4 MG: 2 INJECTION INTRAMUSCULAR; INTRAVENOUS at 14:21

## 2017-07-27 ASSESSMENT — PAIN DESCRIPTION - DESCRIPTORS
DESCRIPTORS: CRAMPING

## 2017-07-27 NOTE — PLAN OF CARE
Problem: Goal Outcome Summary  Goal: Goal Outcome Summary  Outcome: Improving  Patient remains hospitalized for endometritis. Continues on IV abx - Flagyl added today. Continues to have fevers - tmax 101.4 this shift. WBC improved. Continues to have vaginal bleeding. Pain continues but controlled with Toradol and Ofirmev. Continues to report nausea, without emesis. Zofran and Compazine given with relief. Minimal intake. Continues on IVF. Ambulating independently.

## 2017-07-27 NOTE — ED NOTES
Assumed care of patient. Pt reports pain and cramping in lower abdomen, however is refusing additional pain medications at this time. Pt states she will notify RN if additional pain medications are needed. Pt is alert and oriented, ABCs intact.

## 2017-07-27 NOTE — PHARMACY-ADMISSION MEDICATION HISTORY
Admission medication history interview status for this patient is complete. See [facility] admission navigator for allergy information, prior to admission medications and immunization status.     Medication history interview source(s):Patient  Medication history resources (including written lists, pill bottles, clinic record):None  Primary [facility]    Changes made to PTA medication list:  Added: -  Deleted: -  Changed: -    Actions taken by pharmacist (provider contacted, etc):None     Additional medication history information:None    Medication reconciliation/reorder completed by provider prior to medication history? No    Do you take OTC medications (eg tylenol, ibuprofen, fish oil, eye/ear drops, etc)? No    For patients on insulin therapy: No   Lantus/levemir/NPH/Mix 70/30 dose:   (Y/N) (see below)   Sliding scale Novolog Y/N  If Yes, do you have a baseline novolog pre-meal dose:  units with meals   Patients eat three meals a day:   Y/N     Any Barriers to therapy:  cost of medications/comfortable with giving injections (if applicable)/ comfortable and confident with current diabetes regimen:       Prior to Admission medications    Medication Sig Last Dose Taking? Auth Provider   buPROPion (WELLBUTRIN XL) 150 MG 24 hr tablet Take 1 tablet (150 mg) by mouth every morning 7/26/2017 at am Yes Sergei Painting MD   benzonatate (TESSALON) 100 MG capsule Take 2 capsules (200 mg) by mouth 3 times daily as needed  Yes Mayank Wan MD   albuterol (PROAIR HFA/PROVENTIL HFA/VENTOLIN HFA) 108 (90 BASE) MCG/ACT Inhaler Inhale 2 puffs into the lungs every 4 hours as needed for shortness of breath / dyspnea or wheezing /chest tightness/cough  Yes Mayank Wan MD   albuterol (2.5 MG/3ML) 0.083% neb solution Take 1 vial by nebulization every 4 hours as needed  7/26/2017 at Unknown time Yes Mayank Wan MD   BABY ASPIRIN PO Take 81 mg by mouth daily  7/26/2017 at am Yes Reported, Patient   atorvastatin (LIPITOR)  20 MG tablet Take 1 tablet (20 mg) by mouth daily 7/26/2017 at am Yes Violette Fletcher PA-C   sertraline (ZOLOFT) 50 MG tablet Take 1 tablet (50 mg) by mouth daily 7/26/2017 at am Yes Violette Fletcher PA-C   lisinopril-hydrochlorothiazide (PRINZIDE,ZESTORETIC) 20-25 MG per tablet Take 1 tablet by mouth daily 7/26/2017 at am Yes Violette Fletcher PA-C   Vitamin D, Cholecalciferol, 1000 UNITS TABS Take 2,000 Units by mouth daily 7/26/2017 at am Yes Unknown, Entered By History   oxyCODONE (ROXICODONE) 5 MG IR tablet Take 1 tablet by mouth one hour before procedure. May repeat in 4 hours as needed.   Sergei Painting MD

## 2017-07-27 NOTE — TELEPHONE ENCOUNTER
Nurse from 5th floor surgery and oncology wants order for IV dilaudid pt is nauseous and cant take anything by mouth.    Snow العراقي R.N.  [facility]

## 2017-07-27 NOTE — H&P
History and Physical - Ob/Gyn  Modesta Bravo   1965  MRN [id number]    CC: Post-op infection    Consult requested by: Dr. Amy Petit    HPI: Modesta Bravo is a 51 year old  who underwent office based HerOption cryoablation of the endometrial lining on 17 for AUB. Since that times she has experienced ongoing vaginal bleeding and discharge, now increasingly malodorous with coin sized clots and significantly more pain. She endorses nausea without vomiting, fever and chills, and abdominal pain as well as anorexia today.   - Of note, she has a history of MRSA infections x2 following prior  sections.     10 point ROS negative other than as mentioned above    Gyn Hx:  LMP: Patient's last menstrual period was 2017 (exact date). Periods have been very heavy, preventing her from leaving the house prior to her ablation  OB Hx: c/s x2  STIs: none  Pap smears: no abnormals    Past Medical History:   Diagnosis Date     Anxiety      Cough variant asthma      Family history of breast cancer     Pt is BrCa negative     Hyperlipidaemia      Hyperparathyroidism (H)     seen by Dr. Bosch     Hypertension      Menorrhagia      Vitamin deficiency         Past Surgical History:   Procedure Laterality Date     GYN SURGERY       NO HISTORY OF SURGERY            No current facility-administered medications on file prior to encounter.   Current Outpatient Prescriptions on File Prior to Encounter:  albuterol (2.5 MG/3ML) 0.083% neb solution Take 1 vial (2.5 mg) by nebulization once for 1 dose   atorvastatin (LIPITOR) 20 MG tablet Take 1 tablet (20 mg) by mouth daily   sertraline (ZOLOFT) 50 MG tablet Take 1 tablet (50 mg) by mouth daily   lisinopril-hydrochlorothiazide (PRINZIDE,ZESTORETIC) 20-25 MG per tablet Take 1 tablet by mouth daily   Vitamin D, Cholecalciferol, 1000 UNITS TABS Take 2,000 Units by mouth daily   buPROPion (WELLBUTRIN XL) 150 MG 24 hr tablet Take 1 tablet (150 mg) by mouth every  morning   oxyCODONE (ROXICODONE) 5 MG IR tablet Take 1 tablet by mouth one hour before procedure. May repeat in 4 hours as needed.   benzonatate (TESSALON) 100 MG capsule Take 2 capsules (200 mg) by mouth 3 times daily as needed   albuterol (PROAIR HFA/PROVENTIL HFA/VENTOLIN HFA) 108 (90 BASE) MCG/ACT Inhaler Inhale 2 puffs into the lungs every 4 hours as needed for shortness of breath / dyspnea or wheezing /chest tightness/cough   BABY ASPIRIN PO    albuterol (PROAIR HFA, PROVENTIL HFA, VENTOLIN HFA) 108 (90 BASE) MCG/ACT inhaler Inhale 2 puffs into the lungs every 6 hours as needed for shortness of breath / dyspnea or wheezing         No Known Allergies     Social History     Social History     Marital status:      Spouse name: N/A     Number of children: 2     Years of education: N/A     Occupational History     Not on file.     Social History Main Topics     Smoking status: Never Smoker     Smokeless tobacco: Never Used     Alcohol use 0.0 oz/week     0 Standard drinks or equivalent per week      Comment: daily 1 glass wine     Drug use: No     Sexual activity: Yes     Partners: Male     Birth control/ protection: Male Surgical      Comment: vasectomy     Other Topics Concern     Not on file     Social History Narrative    Has a [employer] named Leodan. 2 children. 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        Objective:  Vitals:    07/26/17 1955 07/26/17 2010 07/26/17 2040 07/26/17 2050   BP: 127/79 137/80 130/78    Resp:       Temp:    99  F (37.2  C)   TempSrc:    Oral   SpO2: 90% 92% 92%    Weight:       Height:          Gen: resting comfortably, in NAD  Heart: REGULAR RATE AND RHYTHM, no murmurs  Lungs: CTAB, no crackles or wheezing  Abd: diffusely tender to palpation with increased point tenderness over her umbilicus and suprapubic region  : External genitalia normal well-estrogenized, healthy tissue.  No obvious excoriations, lesions, or rashes. Normal pink  vaginal mucosa.  SSE: Copious green, malodorous thin discharge in the vault, scant old blood. Normal appearing cervix with white purulent discharge at the external os  Bimanual: moderate CMT, very tender to palpation of uterus which is enlarged with an anterior fibroid. No adnexal masses, moderate tenderness appreciated.      Labs/Imaging reviewed by me:  Results for orders placed or performed during the hospital encounter of 07/26/17 (from the past 24 hour(s))   CBC with platelets differential   Result Value Ref Range    WBC 20.4 (H) 4.0 - 11.0 10e9/L    RBC Count 3.86 3.8 - 5.2 10e12/L    Hemoglobin 12.3 11.7 - 15.7 g/dL    Hematocrit 37.8 35.0 - 47.0 %    MCV 98 78 - 100 fl    MCH 31.9 26.5 - 33.0 pg    MCHC 32.5 31.5 - 36.5 g/dL    RDW 12.3 10.0 - 15.0 %    Platelet Count 229 150 - 450 10e9/L    Diff Method Automated Method     % Neutrophils 91.4 %    % Lymphocytes 4.0 %    % Monocytes 3.5 %    % Eosinophils 0.2 %    % Basophils 0.2 %    % Immature Granulocytes 0.7 %    Nucleated RBCs 0 0 /100    Absolute Neutrophil 18.7 (H) 1.6 - 8.3 10e9/L    Absolute Lymphocytes 0.8 0.8 - 5.3 10e9/L    Absolute Monocytes 0.7 0.0 - 1.3 10e9/L    Absolute Eosinophils 0.0 0.0 - 0.7 10e9/L    Absolute Basophils 0.1 0.0 - 0.2 10e9/L    Abs Immature Granulocytes 0.1 0 - 0.4 10e9/L    Absolute Nucleated RBC 0.0    Comprehensive metabolic panel   Result Value Ref Range    Sodium 137 133 - 144 mmol/L    Potassium 3.9 3.4 - 5.3 mmol/L    Chloride 103 94 - 109 mmol/L    Carbon Dioxide 26 20 - 32 mmol/L    Anion Gap 8 3 - 14 mmol/L    Glucose 88 70 - 99 mg/dL    Urea Nitrogen 16 7 - 30 mg/dL    Creatinine 0.77 0.52 - 1.04 mg/dL    GFR Estimate 79 >60 mL/min/1.7m2    GFR Estimate If Black >90   GFR Calc   >60 mL/min/1.7m2    Calcium 9.2 8.5 - 10.1 mg/dL    Bilirubin Total 0.4 0.2 - 1.3 mg/dL    Albumin 3.6 3.4 - 5.0 g/dL    Protein Total 7.2 6.8 - 8.8 g/dL    Alkaline Phosphatase 66 40 - 150 U/L    ALT 60 (H) 0 - 50 U/L     AST 40 0 - 45 U/L   Lactic acid whole blood   Result Value Ref Range    Lactic Acid 1.9 0.7 - 2.1 mmol/L   Blood culture   Result Value Ref Range    Specimen Description Blood Right Arm     Special Requests Aerobic and anaerobic bottles received     Culture Micro No growth after 2 hours     Micro Report Status Pending    UA with Microscopic   Result Value Ref Range    Color Urine Yellow     Appearance Urine Clear     Glucose Urine Negative NEG mg/dL    Bilirubin Urine Negative NEG    Ketones Urine Negative NEG mg/dL    Specific Gravity Urine 1.023 1.003 - 1.035    Blood Urine Moderate (A) NEG    pH Urine 7.0 5.0 - 7.0 pH    Protein Albumin Urine Negative NEG mg/dL    Urobilinogen mg/dL 0.0 0.0 - 2.0 mg/dL    Nitrite Urine Negative NEG    Leukocyte Esterase Urine Moderate (A) NEG    Source Midstream Urine     WBC Urine 8 (H) 0 - 2 /HPF    RBC Urine 8 (H) 0 - 2 /HPF    Squamous Epithelial /HPF Urine 1 0 - 1 /HPF    Mucous Urine Present (A) NEG /LPF   US Pelvic Complete w Transvaginal    Narrative    US PELVIC COMPLETE WITH TRANSVAGINAL 7/26/2017 5:58 PM     INDICATION: Pelvic pain. Ablation 13 days ago. Pain and bleeding.    COMPARISON: None.    FINDINGS: Transabdominal followed by endovaginal ultrasound to better  evaluate endometrium and ovaries. Uterus measures 9.9 x 7.0 x 6.5 cm.  Endometrium is poorly visualized on this exam. There is a 3.4 x 4.3 x  3.4 cm hypoechoic lesion in the right upper anterior uterus most  consistent with a fibroid. Attenuation from this limits visualization  of the uterus and endometrium. The ovaries are not seen. No adnexal  masses are identified. No free fluid or fluid collections.      Impression    IMPRESSION:  1. Visualization limited by a 4.3 cm fibroid in the uterus.  2. Endometrium not well seen.  3. Neither ovary is identified. No suspicious adnexal masses or free  fluid.   Blood culture   Result Value Ref Range    Specimen Description Left Hand     Culture Micro No growth after  1 hour     Micro Report Status Pending    Wet prep   Result Value Ref Range    Specimen Description Vagina     Wet Prep       Moderate PMNs seen  No Trichomonas seen  No yeast seen  No clue cells seen      Micro Report Status FINAL 07/26/2017    CT Abdomen Pelvis w Contrast    Narrative     CT ABDOMEN AND PELVIS WITH CONTRAST 7/26/2017 7:31 PM     HISTORY: Uterine pain, foul discharge, fever, elevated WBC. 13 days  status post uterine ablation.    TECHNIQUE: Volumetric acquisition through abdomen and pelvis with  IV  contrast. 91 mL Isovue-370  Radiation dose for this scan was reduced  using automated exposure control, adjustment of the mA and/or kV  according to patient size, or iterative reconstruction technique.    COMPARISON: Ultrasound 7/26/2017.    FINDINGS: Fatty infiltration of the liver. 2.8 cm cyst in the right  hepatic lobe medially. Gallbladder, spleen, pancreas, adrenal glands  and kidneys are negative. No hydronephrosis.    The uterus is present. There is a heterogeneous poorly defined mass in  the anterior uterus measuring approximately 4.5 cm, most likely a  fibroid. Negative appendix. No suspicious adnexal masses. Normal  appendix. No bowel obstruction, ascites or other acute findings. There  are multiple small and borderline prominent upper retroperitoneal  lymph nodes and bilateral pelvic lymph nodes of uncertain  significance. For example a left iliac chain lymph node on series 2,  image 70 measures approximately 2.8 x 1.4 cm. Degenerative disc  disease at the L3-4 interspace.      Impression    IMPRESSION:  1. Poorly defined heterogeneous 4.5 cm uterine mass, indeterminate  though likely a fibroid. Given the patient's clinical history, an  inflammatory process relating to the prior procedure cannot be  completely excluded. Recommend follow-up as clinically indicated.  2. Mild nonspecific retroperitoneal and pelvic adenopathy of uncertain  significance and etiology. Consider short-term follow-up  imaging to  document stability.  3. No other acute findings.  4. Fatty liver.       Assessment/Plan: Modesta Bravo is a 51 year old , POD#13 s/p HerOption endometrial cryoablation under US guidance, now with s/s of post-op endometritis including uterine tenderness, purulent discharge, fever to 100.4 at home, elevated WBC count, and nausea. CT and US c/w fibroid uterus and post-op pelvic inflammation.    1. FEN: regular diet, NS 75cc/hr  2. Pain: percocet, ibuprofen, hot packs for uterine cramping  3. CV: no issues  4. Pulm: no issues  5. ID: post-op endometritis w/hx obstetric related MRSA infections x2. Plan broad coverage including vanc/zosyn in addition to 1g azithromycin for atypical coverage. IV antibiotics until 24 hours afebrile. Consider d/c home on doxy or flagyl for ongoing outpatient treatment  6. Ppx: SCDs, senna, zantac, tums  7. Dispo: home when transition to PO antibiotics and afebrile x24hr      Kera Cristobal MD  OB/GYN

## 2017-07-27 NOTE — TELEPHONE ENCOUNTER
Pt admitted to [facility] with possible endometritis after HerOption.   Cultures pending. On IV antibiotics.  Called pt to see how she is doing.  WBC dropping but temp up today.  Will stay until temp normalizes.  Pt will f/u with me after discharge.  Appreciated Dr. Cristobal's help.

## 2017-07-27 NOTE — PHARMACY-VANCOMYCIN DOSING SERVICE
Pharmacy Vancomycin Initial Note  Date of Service 2017  Patient's  1965  51 year old, female    Indication: h/o MRSA, Endometritis    Current estimated CrCl = Estimated Creatinine Clearance: 106.8 mL/min (based on Cr of 0.77).    Creatinine for last 3 days  2017:  4:47 PM Creatinine 0.77 mg/dL    Recent Vancomycin Level(s) for last 3 days  No results found for requested labs within last 72 hours.      Vancomycin IV Administrations (past 72 hours)                   vancomycin (VANCOCIN) 1500 mg in 0.9% NaCl 250 mL PREMIX (mg) 1,500 mg New Bag 17 1821                Nephrotoxins and other renal medications (Future)    Start     Dose/Rate Route Frequency Ordered Stop    17 0900  lisinopril-hydrochlorothiazide (PRINZIDE/ZESTORETIC) 20-25 MG per tablet 1 tablet      1 tablet Oral DAILY 17 2213      07/27/17 0600  vancomycin (VANCOCIN) 1000 mg in dextrose 5% 200 mL PREMIX      1,000 mg Intravenous EVERY 12 HOURS 17 2230      17 0000  piperacillin-tazobactam (ZOSYN) intermittent infusion 4.5 g      4.5 g  200 mL/hr over 30 Minutes Intravenous EVERY 6 HOURS 17 2213      17 2209  ibuprofen (ADVIL/MOTRIN) tablet 600 mg      600 mg Oral EVERY 6 HOURS PRN 17 2213            Contrast Orders - past 72 hours (72h ago through future)    Start     Dose/Rate Route Frequency Ordered Stop    17 1924  iopamidol (ISOVUE-370) solution 500 mL      500 mL Intravenous ONCE 17 1923 07/26/17 19217 180  iohexol (OMNIPAQUE) solution 25 mL      25 mL Oral EVERY 30 MIN 17 1801 17 1900                Plan:  1.  Start vancomycin  1500 mg IV q12h.   2.  Goal Trough Level: 10-15 mg/L   3.  Pharmacy will check trough levels as appropriate in 1-3 Days.    4. Serum creatinine levels will be ordered a minimum of twice weekly.    5. [place] method utilized to dose vancomycin therapy: Method 1    Fady Obando

## 2017-07-27 NOTE — PLAN OF CARE
Problem: Goal Outcome Summary  Goal: Goal Outcome Summary  Outcome: No Change  Max temp 101.8, IV ofrimev requested.   Tachy, elevated BP after emesis.   Emesis x 2, Zofran x 2, Compazine x 1, IV Dilaudid x 1.  Up independently, voiding without difficulty. Loose stool x 1.  PIV NS 75/hr. Continues IV Zosyn and Vanco.

## 2017-07-27 NOTE — PROGRESS NOTES
"SPIRITUAL HEALTH SERVICES  SPIRITUAL ASSESSMENT Progress Note  [facility] Med Surg [facility]    PRIMARY FOCUS:     Goals of care    Symptom/pain management    Emotional/spiritual/Episcopal distress    Support for coping    ILLNESS CIRCUMSTANCES:   Reviewed documentation. Reflective conversation shared with Pt. Modesta which integrated elements of illness and family narratives.     Context of Serious Illness/Symptom(s) - Modesta mentioned she recently had a procedure to end her period and she ended up getting an infection from it. She noted having a fever, and being very nauseous from the antibiotics. However, today she is feeling much better.     Resources for Support - Modesta mentions her  as a main source of support.      DISTRESS:     Emotional/Existential/Relational Distress - Modesta notes this year she and her  will be empty nesters. 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 \"I have to rediscover, ME!\"     Spiritual/Presybeterian Distress - none expressed.    Social/Cultural/Economic Distress - none expressed.    SPIRITUAL/Yazidi COPING:     Anglican/[place] - Lutheran     Spiritual Practice(s) - \"I will take all the prayers I can!\"     Emotional/Existential/Relational Connections - Modesta reflected on her relationship with her daughter and the special time she had moving her daughter into her new home in [place] where she will now be a nurse. \"She is such a daddy's girl, but it was nice to be just the two of us. It is a time I will cherish for a long time!\"    GOALS OF CARE:    Goals of Care - Modesta notes needing to stay at least another day to finish with some antibiotics, \"I know I am feeling better though because all this lying around is making me antsy.\"     Meaning/Sense-Making - Modesta loves being a mother and is looking forward to the next stage of her and her 's life together " as empty nesters with adult children!     PLAN: No further plans at this time; however, I and other chaplains remain available per pt/family/staff request.      Danay Rojas   Intern  [phone]

## 2017-07-27 NOTE — PLAN OF CARE
Problem: Goal Outcome Summary  Goal: Goal Outcome Summary  Outcome: No Change  Ambulatory Status:  Pt up with SBA  VS:  99.1, tachy at times  Pain:  Denies at this time  Resp: LS clear  GI:  Denies nausea.  Good appetite and on regular diet.  BS active.  Passing flatus.  Last BM 7/26-soft/loose at baseline.  :  WDL; pt reports odorous vaginal drainage- pink tinged  Skin:  WDL  Tx:  IV zosyn and PO Zithromax x1  Labs:  WBC 20.4  Consults:  OB   Disposition:  TBD

## 2017-07-28 VITALS
OXYGEN SATURATION: 95 % | WEIGHT: 220 LBS | DIASTOLIC BLOOD PRESSURE: 79 MMHG | SYSTOLIC BLOOD PRESSURE: 138 MMHG | HEIGHT: 68 IN | TEMPERATURE: 96.9 F | HEART RATE: 88 BPM | BODY MASS INDEX: 33.34 KG/M2 | RESPIRATION RATE: 16 BRPM

## 2017-07-28 LAB
BACTERIA SPEC CULT: NORMAL
BASOPHILS # BLD AUTO: 0 10E9/L (ref 0–0.2)
BASOPHILS NFR BLD AUTO: 0.3 %
C DIFF TOX B STL QL: NORMAL
DIFFERENTIAL METHOD BLD: ABNORMAL
EOSINOPHIL # BLD AUTO: 0.1 10E9/L (ref 0–0.7)
EOSINOPHIL NFR BLD AUTO: 1.3 %
ERYTHROCYTE [DISTWIDTH] IN BLOOD BY AUTOMATED COUNT: 12.6 % (ref 10–15)
HCT VFR BLD AUTO: 31.3 % (ref 35–47)
HGB BLD-MCNC: 10.2 G/DL (ref 11.7–15.7)
IMM GRANULOCYTES # BLD: 0 10E9/L (ref 0–0.4)
IMM GRANULOCYTES NFR BLD: 0.6 %
LYMPHOCYTES # BLD AUTO: 0.8 10E9/L (ref 0.8–5.3)
LYMPHOCYTES NFR BLD AUTO: 11.2 %
Lab: NORMAL
MCH RBC QN AUTO: 32.4 PG (ref 26.5–33)
MCHC RBC AUTO-ENTMCNC: 32.6 G/DL (ref 31.5–36.5)
MCV RBC AUTO: 99 FL (ref 78–100)
MICRO REPORT STATUS: NORMAL
MONOCYTES # BLD AUTO: 0.4 10E9/L (ref 0–1.3)
MONOCYTES NFR BLD AUTO: 6 %
NEUTROPHILS # BLD AUTO: 5.6 10E9/L (ref 1.6–8.3)
NEUTROPHILS NFR BLD AUTO: 80.6 %
NRBC # BLD AUTO: 0 10*3/UL
NRBC BLD AUTO-RTO: 0 /100
PLATELET # BLD AUTO: 149 10E9/L (ref 150–450)
RBC # BLD AUTO: 3.15 10E12/L (ref 3.8–5.2)
SPECIMEN SOURCE: NORMAL
SPECIMEN SOURCE: NORMAL
VANCOMYCIN SERPL-MCNC: 10.6 MG/L
WBC # BLD AUTO: 6.9 10E9/L (ref 4–11)

## 2017-07-28 PROCEDURE — 87493 C DIFF AMPLIFIED PROBE: CPT | Performed by: OBSTETRICS & GYNECOLOGY

## 2017-07-28 PROCEDURE — 99238 HOSP IP/OBS DSCHRG MGMT 30/<: CPT | Performed by: OBSTETRICS & GYNECOLOGY

## 2017-07-28 PROCEDURE — 25000125 ZZHC RX 250: Performed by: OBSTETRICS & GYNECOLOGY

## 2017-07-28 PROCEDURE — 80202 ASSAY OF VANCOMYCIN: CPT | Performed by: OBSTETRICS & GYNECOLOGY

## 2017-07-28 PROCEDURE — 36415 COLL VENOUS BLD VENIPUNCTURE: CPT | Performed by: OBSTETRICS & GYNECOLOGY

## 2017-07-28 PROCEDURE — 85025 COMPLETE CBC W/AUTO DIFF WBC: CPT | Performed by: OBSTETRICS & GYNECOLOGY

## 2017-07-28 PROCEDURE — 25000128 H RX IP 250 OP 636: Performed by: OBSTETRICS & GYNECOLOGY

## 2017-07-28 RX ORDER — ONDANSETRON 4 MG/1
4 TABLET, ORALLY DISINTEGRATING ORAL EVERY 6 HOURS PRN
Qty: 30 TABLET | Refills: 1 | Status: SHIPPED | OUTPATIENT
Start: 2017-07-28 | End: 2018-03-27

## 2017-07-28 RX ORDER — CEPHALEXIN 500 MG/1
500 CAPSULE ORAL 4 TIMES DAILY
Qty: 28 CAPSULE | Refills: 0 | Status: SHIPPED | OUTPATIENT
Start: 2017-07-28 | End: 2018-03-27

## 2017-07-28 RX ORDER — METRONIDAZOLE 500 MG/1
500 TABLET ORAL 3 TIMES DAILY
Qty: 21 TABLET | Refills: 0 | Status: SHIPPED | OUTPATIENT
Start: 2017-07-28 | End: 2018-03-27

## 2017-07-28 RX ADMIN — ONDANSETRON 4 MG: 2 INJECTION INTRAMUSCULAR; INTRAVENOUS at 08:12

## 2017-07-28 RX ADMIN — TAZOBACTAM SODIUM AND PIPERACILLIN SODIUM 4.5 G: 500; 4 INJECTION, SOLUTION INTRAVENOUS at 00:24

## 2017-07-28 RX ADMIN — KETOROLAC TROMETHAMINE 30 MG: 30 INJECTION, SOLUTION INTRAMUSCULAR at 00:23

## 2017-07-28 RX ADMIN — VANCOMYCIN HYDROCHLORIDE 1000 MG: 1 INJECTION, SOLUTION INTRAVENOUS at 07:07

## 2017-07-28 RX ADMIN — METRONIDAZOLE 500 MG: 500 INJECTION, SOLUTION INTRAVENOUS at 04:58

## 2017-07-28 RX ADMIN — TAZOBACTAM SODIUM AND PIPERACILLIN SODIUM 4.5 G: 500; 4 INJECTION, SOLUTION INTRAVENOUS at 06:30

## 2017-07-28 RX ADMIN — PROCHLORPERAZINE EDISYLATE 10 MG: 5 INJECTION INTRAMUSCULAR; INTRAVENOUS at 00:24

## 2017-07-28 ASSESSMENT — PAIN DESCRIPTION - DESCRIPTORS: DESCRIPTORS: CRAMPING

## 2017-07-28 NOTE — PLAN OF CARE
Problem: Goal Outcome Summary  Goal: Goal Outcome Summary  Outcome: Improving   Patient alert and oriented  X4. VSS. Afebrile, temp 97.8. C/o  Abdominal pain 5/10. Had tramadol and compazine  X 1. Up independently in room. On IV zosyn, flagyl and vancomycin. Bleeding way less  Per patient.

## 2017-07-28 NOTE — DISCHARGE INSTRUCTIONS
Follow up with primary MD and OB/GYN as instructed.   Call MD :  Fever of 101.5  Nausea not controlled with medication.

## 2017-07-28 NOTE — PROGRESS NOTES
[facility]    Progress Note  Obstetrics and Gynecology     Date of Admission:  2017  Date of Service (when I saw the patient): 17    Assessment & Plan   Modesta Bravo is a 51 year old female  who presented to the ED last evening with fevers, pain, and purulent discharge after a cryoablation as outpatient.    ID: on vancomycin for MRSA coverage, zosyn, and this morning flagyl was added. Now with decreasing WBC count and clinical improvement, blood culture negative to date. Received one dose of azithromycin to cover atypicals as well.  -Continue antibiotics until 24 hours afebrile, then discharge on 10-14 days of flagyl or doxycycline.  -Needs two negative nasal swabs as an outpatient to clear her from MRSA contact precautions.    FEN: regular diet. Heplock IV.    Pain: not currently an issue.    Dispo: to home when afebrile for 24 hours.  -Dr. Painting is aware and will see her within a week of discharge as a follow up in clinic.    Sloane Beavers    Primary Care Physician   Toro Hugo MD      Allergies   No Known Allergies  Physical Exam   Temp: 101.5  F (38.6  C) Temp src: Oral BP: 114/68   Heart Rate: 75 Resp: 18 SpO2: 92 % O2 Device: None (Room air)    Vital Signs with Ranges  Temp:  [98.8  F (37.1  C)-101.8  F (38.8  C)] 101.5  F (38.6  C)  Heart Rate:  [] 75  Resp:  [16-20] 18  BP: (114-140)/(66-82) 114/68  SpO2:  [92 %-97 %] 92 %  220 lbs 0 oz    Constitutional: awake, alert, cooperative, no apparent distress, and appears stated age  Respiratory: no increased work of breathing and good air exchange  Cardiovascular: normal apical pulses  and no edema  GI: non-distended and non-tender  Genitounirinary: not reexamined  Skin/Extremities: no bruising or bleeding and normal skin color, texture, turgor  Musculoskeletal: no lower extremity pitting edema present  there is no redness, warmth, or swelling of the joints  Neurologic: Mental Status Exam:  Level of Alertness:    awake  Orientation:   person, place, time  Neuropsychiatric: Affect: normal and pleasant    Data   Results for orders placed or performed during the hospital encounter of 07/26/17 (from the past 24 hour(s))   Basic metabolic panel   Result Value Ref Range    Sodium 136 133 - 144 mmol/L    Potassium 3.9 3.4 - 5.3 mmol/L    Chloride 103 94 - 109 mmol/L    Carbon Dioxide 25 20 - 32 mmol/L    Anion Gap 8 3 - 14 mmol/L    Glucose 121 (H) 70 - 99 mg/dL    Urea Nitrogen 11 7 - 30 mg/dL    Creatinine 0.79 0.52 - 1.04 mg/dL    GFR Estimate 77 >60 mL/min/1.7m2    GFR Estimate If Black >90   GFR Calc   >60 mL/min/1.7m2    Calcium 8.4 (L) 8.5 - 10.1 mg/dL   CBC with platelets differential   Result Value Ref Range    WBC 13.0 (H) 4.0 - 11.0 10e9/L    RBC Count 3.28 (L) 3.8 - 5.2 10e12/L    Hemoglobin 10.7 (L) 11.7 - 15.7 g/dL    Hematocrit 32.3 (L) 35.0 - 47.0 %    MCV 99 78 - 100 fl    MCH 32.6 26.5 - 33.0 pg    MCHC 33.1 31.5 - 36.5 g/dL    RDW 12.6 10.0 - 15.0 %    Platelet Count 176 150 - 450 10e9/L    Diff Method Automated Method     % Neutrophils 91.6 %    % Lymphocytes 3.7 %    % Monocytes 3.8 %    % Eosinophils 0.1 %    % Basophils 0.2 %    % Immature Granulocytes 0.6 %    Nucleated RBCs 0 0 /100    Absolute Neutrophil 11.9 (H) 1.6 - 8.3 10e9/L    Absolute Lymphocytes 0.5 (L) 0.8 - 5.3 10e9/L    Absolute Monocytes 0.5 0.0 - 1.3 10e9/L    Absolute Eosinophils 0.0 0.0 - 0.7 10e9/L    Absolute Basophils 0.0 0.0 - 0.2 10e9/L    Abs Immature Granulocytes 0.1 0 - 0.4 10e9/L    Absolute Nucleated RBC 0.0    Lactic acid whole blood   Result Value Ref Range    Lactic Acid 1.3 0.7 - 2.1 mmol/L

## 2017-07-28 NOTE — PLAN OF CARE
Problem: Goal Outcome Summary  Goal: Goal Outcome Summary  Outcome: No Change  VSS except temp as high as 101.5 given PO tylenol. IV Toradol given for pain 7/10. Vaginal bleeding small to moderate. Pain improving and nausea controlled by zofran and compazine.

## 2017-07-28 NOTE — PROGRESS NOTES
Infection Prevention:    Patient placed on Enteric precautions because of possible Cdiff. If negative, continue with Contact precautions for MRSA hx. Please contact Infection Prevention with any questions/concerns at [phone].    Ashley Cosme, ICP

## 2017-07-28 NOTE — DISCHARGE SUMMARY
[facility] Discharge Summary    Modesta Bravo MRN# [id number]   Age: 51 year old YOB: 1965     Date of Admission:  7/26/2017  Date of Discharge::  7/28/2017   Admitting Physician:  7/28/2017  Discharge Physician:  London Prakash MD     Home clinic: [facility]          Admission Diagnoses:   Endometritis [N71.9]          Discharge Diagnosis:   same     Note: MRSA test and test for C. dificile are pending at time of discharge         Procedures:   Procedure(s):  none       No other procedures performed during this admission           Medications Prior to Admission:     Prescriptions Prior to Admission   Medication Sig Dispense Refill Last Dose     buPROPion (WELLBUTRIN XL) 150 MG 24 hr tablet Take 1 tablet (150 mg) by mouth every morning 30 tablet 0 7/26/2017 at am     benzonatate (TESSALON) 100 MG capsule Take 2 capsules (200 mg) by mouth 3 times daily as needed 42 capsule 3 Taking     albuterol (PROAIR HFA/PROVENTIL HFA/VENTOLIN HFA) 108 (90 BASE) MCG/ACT Inhaler Inhale 2 puffs into the lungs every 4 hours as needed for shortness of breath / dyspnea or wheezing /chest tightness/cough 1 Inhaler 1 Taking     BABY ASPIRIN PO Take 81 mg by mouth daily    7/26/2017 at am     atorvastatin (LIPITOR) 20 MG tablet Take 1 tablet (20 mg) by mouth daily 90 tablet 3 7/26/2017 at am     sertraline (ZOLOFT) 50 MG tablet Take 1 tablet (50 mg) by mouth daily 90 tablet 3 7/26/2017 at am     lisinopril-hydrochlorothiazide (PRINZIDE,ZESTORETIC) 20-25 MG per tablet Take 1 tablet by mouth daily 90 tablet 3 7/26/2017 at am     Vitamin D, Cholecalciferol, 1000 UNITS TABS Take 2,000 Units by mouth daily   7/26/2017 at am     [DISCONTINUED] oxyCODONE (ROXICODONE) 5 MG IR tablet Take 1 tablet by mouth one hour before procedure. May repeat in 4 hours as needed. 3 tablet 0 Taking     [DISCONTINUED] albuterol (2.5 MG/3ML) 0.083% neb solution Take 1 vial by nebulization every 4 hours as needed     7/26/2017 at Unknown time             Discharge Medications:     Current Discharge Medication List      START taking these medications    Details   ondansetron (ZOFRAN-ODT) 4 MG ODT tab Take 1 tablet (4 mg) by mouth every 6 hours as needed for nausea or vomiting  Qty: 30 tablet, Refills: 1    Associated Diagnoses: Endometritis      metroNIDAZOLE (FLAGYL) 500 MG tablet Take 1 tablet (500 mg) by mouth 3 times daily  Qty: 21 tablet, Refills: 0    Associated Diagnoses: Endometritis      cephALEXin (KEFLEX) 500 MG capsule Take 1 capsule (500 mg) by mouth 4 times daily  Qty: 28 capsule, Refills: 0    Associated Diagnoses: Endometritis         CONTINUE these medications which have NOT CHANGED    Details   buPROPion (WELLBUTRIN XL) 150 MG 24 hr tablet Take 1 tablet (150 mg) by mouth every morning  Qty: 30 tablet, Refills: 0    Associated Diagnoses: Sexual dysfunction      benzonatate (TESSALON) 100 MG capsule Take 2 capsules (200 mg) by mouth 3 times daily as needed  Qty: 42 capsule, Refills: 3    Associated Diagnoses: Cough      albuterol (PROAIR HFA/PROVENTIL HFA/VENTOLIN HFA) 108 (90 BASE) MCG/ACT Inhaler Inhale 2 puffs into the lungs every 4 hours as needed for shortness of breath / dyspnea or wheezing /chest tightness/cough  Qty: 1 Inhaler, Refills: 1    Associated Diagnoses: Asthma exacerbation; Cough      BABY ASPIRIN PO Take 81 mg by mouth daily     Associated Diagnoses: Essential hypertension with goal blood pressure less than 140/90      atorvastatin (LIPITOR) 20 MG tablet Take 1 tablet (20 mg) by mouth daily  Qty: 90 tablet, Refills: 3    Associated Diagnoses: Hyperlipidemia LDL goal <100      sertraline (ZOLOFT) 50 MG tablet Take 1 tablet (50 mg) by mouth daily  Qty: 90 tablet, Refills: 3    Associated Diagnoses: Anxiety      lisinopril-hydrochlorothiazide (PRINZIDE,ZESTORETIC) 20-25 MG per tablet Take 1 tablet by mouth daily  Qty: 90 tablet, Refills: 3    Associated Diagnoses: Essential hypertension with goal  blood pressure less than 140/90      Vitamin D, Cholecalciferol, 1000 UNITS TABS Take 2,000 Units by mouth daily         STOP taking these medications       oxyCODONE (ROXICODONE) 5 MG IR tablet Comments:   Reason for Stopping:         albuterol (2.5 MG/3ML) 0.083% neb solution Comments:   Reason for Stopping:                     Consultations:             Brief History of Illness:   Admitted for treatment of endometritis     Resolved with Zosyn/Flagyl           Hospital Course:   The patient's hospital course was unremarkable.  She recovered as anticipated and experienced no post-operative complications.       Discharge Instructions and Follow-Up:   Discharge diet: Regular   Discharge activity: Activity as tolerated   Discharge follow-up: Follow up with primary care provider in 1 weeks   Wound care Drink plenty of fluids           Discharge Disposition:   Discharged to home      Attestation:  I have reviewed today's vital signs, notes, medications, labs and imaging.    London Prakash MD

## 2017-07-28 NOTE — PLAN OF CARE
Problem: Goal Outcome Summary  Goal: Goal Outcome Summary  Outcome: Adequate for Discharge Date Met:  07/28/17  Pt given zofran for nausea, tolerated all of breakfast. Diarrhea 6 times over night. Stool culture sent. MD aware result is pending. Scripts given to Pt to fill at her own pharmacy. Discharge instructions and meds reviewed and no further questions. While result is pending related to stool, information given to pt and instructed to delegate self own bathroom and all instructions understood. DC complete and dc via escort at 1045.

## 2017-07-29 LAB
BACTERIA SPEC CULT: ABNORMAL
MICRO REPORT STATUS: ABNORMAL
MICROORGANISM SPEC CULT: ABNORMAL
SPECIMEN SOURCE: ABNORMAL

## 2017-08-01 ENCOUNTER — TELEPHONE (OUTPATIENT)
Dept: PEDIATRICS | Facility: CLINIC | Age: 52
End: 2017-08-01

## 2017-08-01 LAB
BACTERIA SPEC CULT: NO GROWTH
BACTERIA SPEC CULT: NO GROWTH
Lab: NORMAL
Lab: NORMAL
MICRO REPORT STATUS: NORMAL
MICRO REPORT STATUS: NORMAL
SPECIMEN SOURCE: NORMAL
SPECIMEN SOURCE: NORMAL

## 2017-08-01 NOTE — TELEPHONE ENCOUNTER
ED / Discharge Outreach Protocol    Patient Contact    Attempt # 2    Was call answered?  No.  Left message on voicemail with information to call me back.    Nohemi Marley RN.  Nurse Triage

## 2017-08-01 NOTE — TELEPHONE ENCOUNTER
Please contact patient for In-patient follow up.  [phone] (home) none (work)    Visit date: 7/28/17  Diagnosis listed:endometritis  Number of visits in past 12 months:1    Kelly Patterson,   [facility]

## 2017-08-01 NOTE — TELEPHONE ENCOUNTER
ED / Discharge Outreach Protocol    Patient Contact    Attempt # 1    Was call answered?  No.  Left message on voicemail with information to call me back.    Nohemi Marley RN.  Nurse Triage

## 2017-08-02 NOTE — TELEPHONE ENCOUNTER
Pt has a f/u appointment with Ob tomorrow.    ED / Discharge Outreach Protocol    Patient Contact    Attempt # 3    Was call answered?  Nehal Hong, RN  Triage Nurse

## 2017-08-03 ENCOUNTER — OFFICE VISIT (OUTPATIENT)
Dept: OBGYN | Facility: CLINIC | Age: 52
End: 2017-08-03
Payer: COMMERCIAL

## 2017-08-03 VITALS
HEIGHT: 68 IN | BODY MASS INDEX: 32.8 KG/M2 | SYSTOLIC BLOOD PRESSURE: 110 MMHG | DIASTOLIC BLOOD PRESSURE: 70 MMHG | WEIGHT: 216.4 LBS

## 2017-08-03 DIAGNOSIS — N71.9 ENDOMETRITIS: Primary | ICD-10-CM

## 2017-08-03 DIAGNOSIS — R05.9 COUGH: ICD-10-CM

## 2017-08-03 DIAGNOSIS — F41.9 ANXIETY: ICD-10-CM

## 2017-08-03 PROCEDURE — 99213 OFFICE O/P EST LOW 20 MIN: CPT | Performed by: OBSTETRICS & GYNECOLOGY

## 2017-08-03 RX ORDER — BENZONATATE 100 MG/1
200 CAPSULE ORAL 3 TIMES DAILY PRN
Qty: 42 CAPSULE | Refills: 3 | Status: SHIPPED | OUTPATIENT
Start: 2017-08-03 | End: 2018-04-06

## 2017-08-03 NOTE — PROGRESS NOTES
SUBJECTIVE:                                                   Modesta Bravo is a 51 year old female who presents to clinic today for the following health issue(s):  Patient presents with:  Follow Up For: was seen in ER for uterine infection and GBS after HerOption procedure        HPI:  Here for f/u from recent hospitalization for endometritis after HerOption procedure done 17.  Pt admitted for IV antibiotics 10 days later. Cultured positive for strep. Hx of MRSA but negative culture.  Doing much better. No vaginal discharge. Having first menses. Not heavy. No fever or pelvic pain.  Finishing antibiotics.  Has concert this weekend and wants to have ETOH. Taking Flagyl.     Patient's last menstrual period was 2017 (exact date)..   Patient is sexually active, .  Using vasectomy for contraception.    reports that she has never smoked. She has never used smokeless tobacco.      STD testing offered?  Declined    Health maintenance updated:  yes    Today's PHQ-2 Score:   PHQ-2 (  [employer]) 10/5/2015   Q1: Little interest or pleasure in doing things 0   Q2: Feeling down, depressed or hopeless 0   PHQ-2 Score 0     Today's PHQ-9 Score:   PHQ-9 SCORE 2017   Total Score 5     Today's SARAI-7 Score:   SARAI-7 SCORE 2017   Total Score 6       Problem list and histories reviewed & adjusted, as indicated.  Additional history: as documented.    Patient Active Problem List   Diagnosis     Hyperlipidemia     Hyperparathyroidism (H)     Hypertension goal BP (blood pressure) < 140/90     Anxiety     Cough     Endometritis     Past Surgical History:   Procedure Laterality Date     GYN SURGERY       NO HISTORY OF SURGERY        Social History   Substance Use Topics     Smoking status: Never Smoker     Smokeless tobacco: Never Used     Alcohol use 0.0 oz/week     0 Standard drinks or equivalent per week      Comment: daily 1 glass wine      Problem (# of Occurrences) Relation (Name,Age of Onset)    Breast  "Cancer (3) Sister (47): pt is BRCA neg, Maternal Grandmother, Sister    DIABETES (2) Mother, Father    Hyperlipidemia (2) Mother, Father    Hypertension (2) Mother, Father: chf 73            Current Outpatient Prescriptions   Medication Sig     Omeprazole (PRILOSEC PO)      benzonatate (TESSALON) 100 MG capsule Take 2 capsules (200 mg) by mouth 3 times daily as needed     ondansetron (ZOFRAN-ODT) 4 MG ODT tab Take 1 tablet (4 mg) by mouth every 6 hours as needed for nausea or vomiting     metroNIDAZOLE (FLAGYL) 500 MG tablet Take 1 tablet (500 mg) by mouth 3 times daily     cephALEXin (KEFLEX) 500 MG capsule Take 1 capsule (500 mg) by mouth 4 times daily     buPROPion (WELLBUTRIN XL) 150 MG 24 hr tablet Take 1 tablet (150 mg) by mouth every morning     albuterol (PROAIR HFA/PROVENTIL HFA/VENTOLIN HFA) 108 (90 BASE) MCG/ACT Inhaler Inhale 2 puffs into the lungs every 4 hours as needed for shortness of breath / dyspnea or wheezing /chest tightness/cough     BABY ASPIRIN PO Take 81 mg by mouth daily      atorvastatin (LIPITOR) 20 MG tablet Take 1 tablet (20 mg) by mouth daily     sertraline (ZOLOFT) 50 MG tablet Take 1 tablet (50 mg) by mouth daily     lisinopril-hydrochlorothiazide (PRINZIDE,ZESTORETIC) 20-25 MG per tablet Take 1 tablet by mouth daily     Vitamin D, Cholecalciferol, 1000 UNITS TABS Take 2,000 Units by mouth daily     No current facility-administered medications for this visit.      No Known Allergies    ROS:  12 point review of systems negative other than symptoms noted below.  Constitutional: Loss of Appetite  Respiratory: Cough  Gastrointestinal: Diarrhea  Genitourinary: Cramps, Heavy Bleeding with Period, Irregular Menses, Night Sweats and Vaginal Dryness  Endocrine: Decreased Libido  Psychiatric: Difficulty Sleeping    OBJECTIVE:     /70  Ht 5' 8\" (1.727 m)  Wt 216 lb 6.4 oz (98.2 kg)  LMP 08/01/2017 (Exact Date)  BMI 32.9 kg/m2  Body mass index is 32.9 " kg/(m^2).    Exam:  Constitutional:  Appearance: Well nourished, well developed alert, in no acute distress  Neurologic/Psychiatric:  Mental Status:  Oriented X3      In-Clinic Test Results:  No results found for this or any previous visit (from the past 24 hour(s)).    ASSESSMENT/PLAN:                                                        ICD-10-CM    1. Endometritis N71.9    2. Cough R05 benzonatate (TESSALON) 100 MG capsule   3. Anxiety F41.9        Appears to be better and recovered. Wants to stop Flagyl a day early due to ETOH avoidance. This was OKd.   Observe cycles for improvement.  Just restarted Wellbutrin for mood. RTC 30 days for f/u. Plan MRSA nasal swabs X2 to confirm she is no longer a carrier.      Sergei Painting MD  [facility] FOR [facility]

## 2017-08-03 NOTE — MR AVS SNAPSHOT
"              After Visit Summary   8/3/2017    Modesta Bravo    MRN: [id number]           Patient Information     Date Of Birth          1965        Visit Information        Provider Department      8/3/2017 10:50 AM Sergei Painting MD [facility]        Today's Diagnoses     Endometritis    -  1    Cough        Anxiety           Follow-ups after your visit        Who to contact     If you have questions or need follow up information about today's clinic visit or your schedule please contact [facility] directly at [phone].  Normal or non-critical lab and imaging results will be communicated to you by [employer], letter or phone within 4 business days after the clinic has received the results. If you do not hear from us within 7 days, please contact the clinic through [employer] or phone. If you have a critical or abnormal lab result, we will notify you by phone as soon as possible.  Submit refill requests through [employer] or call your pharmacy and they will forward the refill request to us. Please allow 3 business days for your refill to be completed.          Additional Information About Your Visit        MyChart Information     [employer] gives you secure access to your electronic health record. If you see a primary care provider, you can also send messages to your care team and make appointments. If you have questions, please call your primary care clinic.  If you do not have a primary care provider, please call [phone] and they will assist you.        Care EveryWhere ID     This is your Care EveryWhere ID. This could be used by other organizations to access your [place] medical records  [id number]        Your Vitals Were     Height Last Period BMI (Body Mass Index)             5' 8\" (1.727 m) 08/01/2017 (Exact Date) 32.9 kg/m2          Blood Pressure from Last 3 Encounters:   08/03/17 110/70   07/28/17 138/79   07/14/17 126/75    Weight from Last 3 " Encounters:   08/03/17 216 lb 6.4 oz (98.2 kg)   07/26/17 220 lb (99.8 kg)   07/19/17 213 lb (96.6 kg)              Today, you had the following     No orders found for display         Where to get your medicines      These medications were sent to [id number] - [place] - [address]  AT [address]  [address] [place] [address]     Phone:  [phone]     benzonatate 100 MG capsule          Primary Care Provider Office Phone # Fax #    Toro Jeff Hugo [phone]       [facility] [place] [address] [place] [address]        Equal Access to Services     [facility]: Hadii lucy ku hadasho Sojackieali, waaxda luqadaha, qaybta kaalmada adeegyada, waxay románin mariah david . So [facility] [place] [phone].    ATENCIÓN: Si habla español, tiene a soler disposición servicios gratuitos de asistencia lingüística. [facility] [phone].    We comply with applicable federal civil rights laws and [place] laws. We do not discriminate on the basis of race, color, national origin, age, disability sex, sexual orientation or gender identity.            Thank you!     Thank you for choosing [facility] FOR [facility]  for your care. Our goal is always to provide you with excellent care. Hearing back from our patients is one way we can continue to improve our services. Please take a few minutes to complete the written survey that you may receive in the mail after your visit with us. Thank you!             Your Updated Medication List - Protect others around you: Learn how to safely use, store and throw away your medicines at www.disposemymeds.org.          This list is accurate as of: 8/3/17 12:51 PM.  Always use your most recent med list.                   Brand Name Dispense Instructions for use Diagnosis    albuterol 108 (90 BASE) MCG/ACT Inhaler    PROAIR HFA/PROVENTIL HFA/VENTOLIN HFA    1 Inhaler    Inhale 2 puffs into the lungs  every 4 hours as needed for shortness of breath / dyspnea or wheezing /chest tightness/cough    Asthma exacerbation, Cough       atorvastatin 20 MG tablet    LIPITOR    90 tablet    Take 1 tablet (20 mg) by mouth daily    Hyperlipidemia LDL goal <100       BABY ASPIRIN PO      Take 81 mg by mouth daily    Essential hypertension with goal blood pressure less than 140/90       benzonatate 100 MG capsule    TESSALON    42 capsule    Take 2 capsules (200 mg) by mouth 3 times daily as needed    Cough       buPROPion 150 MG 24 hr tablet    WELLBUTRIN XL    30 tablet    Take 1 tablet (150 mg) by mouth every morning    Sexual dysfunction       cephALEXin 500 MG capsule    KEFLEX    28 capsule    Take 1 capsule (500 mg) by mouth 4 times daily    Endometritis       lisinopril-hydrochlorothiazide 20-25 MG per tablet    PRINZIDE/ZESTORETIC    90 tablet    Take 1 tablet by mouth daily    Essential hypertension with goal blood pressure less than 140/90       metroNIDAZOLE 500 MG tablet    FLAGYL    21 tablet    Take 1 tablet (500 mg) by mouth 3 times daily    Endometritis       ondansetron 4 MG ODT tab    ZOFRAN-ODT    30 tablet    Take 1 tablet (4 mg) by mouth every 6 hours as needed for nausea or vomiting    Endometritis       PRILOSEC PO           sertraline 50 MG tablet    ZOLOFT    90 tablet    Take 1 tablet (50 mg) by mouth daily    Anxiety       Vitamin D (Cholecalciferol) 1000 UNITS Tabs      Take 2,000 Units by mouth daily

## 2017-09-26 DIAGNOSIS — R37 SEXUAL DYSFUNCTION: ICD-10-CM

## 2017-09-26 RX ORDER — BUPROPION HYDROCHLORIDE 150 MG/1
TABLET ORAL
Qty: 30 TABLET | Refills: 0 | OUTPATIENT
Start: 2017-09-26

## 2017-09-26 NOTE — TELEPHONE ENCOUNTER
buPROPion (WELLBUTRIN XL) 150 MG 24 hr tablet    Last Written Prescription Date:  8/29/17  Last Fill Quantity: 30,   # refills: 0  Last Office Visit with [facility] primary care provider:  8/3/17  Future Office visit: none    Routing refill request to provider for review/approval because:  Rx denied. Pt needs appt for follow-up    8/3/17 Just restarted Wellbutrin for mood. RTC 30 days for f/u.

## 2017-10-05 ENCOUNTER — TELEPHONE (OUTPATIENT)
Dept: OBGYN | Facility: CLINIC | Age: 52
End: 2017-10-05

## 2017-10-05 DIAGNOSIS — R05.9 COUGH: ICD-10-CM

## 2017-10-05 DIAGNOSIS — R37 SEXUAL DYSFUNCTION: ICD-10-CM

## 2017-10-05 DIAGNOSIS — N71.9 ENDOMETRITIS: ICD-10-CM

## 2017-10-05 NOTE — TELEPHONE ENCOUNTER
Would like RX refilled: Bupropionxl (??)    Pharmacy: [facility] in [place]    Please call

## 2017-10-05 NOTE — TELEPHONE ENCOUNTER
"wellbutrin      Last Written Prescription Date:  8/29/17  Last Fill Quantity: 30,   # refills: 0  Last Office Visit with [facility] primary care provider:  8/3/17-problem visit, 2/13/17 annual  Future Office visit: none    Telephone note 8/29/17: \"POC note 7/7/17: \"Will try adding Wellbutrin to zoloft for a month. If helps with anorgasmia or decreased libido, will continue. She had SADD as well so this will help. If no effect, will get Testosterone IM and see what effects.\"Pt has noticed no improvement yet with Wellbutrin. Pt states she was sick and had to go to the hospital this past month and did not give the Wellbutrin a \"fair try.\" Pt would like to try taking for another month to see if she notices improvement.\"    Rx was approved. Need to ask pt how Rx is working for her now. LMTCB  "

## 2017-10-06 RX ORDER — BUPROPION HYDROCHLORIDE 150 MG/1
TABLET ORAL
Qty: 90 TABLET | Refills: 1 | Status: SHIPPED | OUTPATIENT
Start: 2017-10-06 | End: 2018-03-27

## 2017-10-06 ASSESSMENT — ANXIETY QUESTIONNAIRES
3. WORRYING TOO MUCH ABOUT DIFFERENT THINGS: NOT AT ALL
GAD7 TOTAL SCORE: 3
5. BEING SO RESTLESS THAT IT IS HARD TO SIT STILL: NOT AT ALL
6. BECOMING EASILY ANNOYED OR IRRITABLE: SEVERAL DAYS
1. FEELING NERVOUS, ANXIOUS, OR ON EDGE: SEVERAL DAYS
2. NOT BEING ABLE TO STOP OR CONTROL WORRYING: SEVERAL DAYS
IF YOU CHECKED OFF ANY PROBLEMS ON THIS QUESTIONNAIRE, HOW DIFFICULT HAVE THESE PROBLEMS MADE IT FOR YOU TO DO YOUR WORK, TAKE CARE OF THINGS AT HOME, OR GET ALONG WITH OTHER PEOPLE: NOT DIFFICULT AT ALL
7. FEELING AFRAID AS IF SOMETHING AWFUL MIGHT HAPPEN: NOT AT ALL

## 2017-10-06 ASSESSMENT — PATIENT HEALTH QUESTIONNAIRE - PHQ9
SUM OF ALL RESPONSES TO PHQ QUESTIONS 1-9: 2
5. POOR APPETITE OR OVEREATING: NOT AT ALL

## 2017-10-06 NOTE — TELEPHONE ENCOUNTER
Pt stated the wellbutrin is working well for her. PHQ9 score:2, GAD7 score 3. Pt requesting refill until next annual due time February. Per Dr. Painting's note on 7/7/17 Rx refilled.

## 2017-10-07 ASSESSMENT — ANXIETY QUESTIONNAIRES: GAD7 TOTAL SCORE: 3

## 2017-11-09 DIAGNOSIS — I10 ESSENTIAL HYPERTENSION WITH GOAL BLOOD PRESSURE LESS THAN 140/90: ICD-10-CM

## 2017-11-09 RX ORDER — LISINOPRIL AND HYDROCHLOROTHIAZIDE 20; 25 MG/1; MG/1
TABLET ORAL
Qty: 90 TABLET | Refills: 0 | Status: SHIPPED | OUTPATIENT
Start: 2017-11-09 | End: 2018-02-09

## 2017-11-09 NOTE — TELEPHONE ENCOUNTER
lisinopril-hydrochlorothiazide (PRINZIDE,ZESTORETIC) 20-25 MG per tablet  Last Written Prescription Date:  11/11/16  Last Fill Quantity: 90,   # refills: 3  Last Office Visit with [facility] primary care provider:  8/3/17 problem visit, 2/13/17 Annual  Future Office visit: none    Routing refill request to provider for review/approval because:  Refill sent until next annual.

## 2018-02-09 ENCOUNTER — ALLIED HEALTH/NURSE VISIT (OUTPATIENT)
Dept: NURSING | Facility: CLINIC | Age: 53
End: 2018-02-09
Payer: COMMERCIAL

## 2018-02-09 DIAGNOSIS — I10 ESSENTIAL HYPERTENSION WITH GOAL BLOOD PRESSURE LESS THAN 140/90: ICD-10-CM

## 2018-02-09 DIAGNOSIS — Z23 NEED FOR PROPHYLACTIC VACCINATION AND INOCULATION AGAINST INFLUENZA: Primary | ICD-10-CM

## 2018-02-09 DIAGNOSIS — E78.5 HYPERLIPIDEMIA LDL GOAL <100: ICD-10-CM

## 2018-02-09 PROCEDURE — 99207 ZZC NO CHARGE NURSE ONLY: CPT

## 2018-02-09 PROCEDURE — 90471 IMMUNIZATION ADMIN: CPT

## 2018-02-09 PROCEDURE — 90686 IIV4 VACC NO PRSV 0.5 ML IM: CPT

## 2018-02-09 NOTE — MR AVS SNAPSHOT
After Visit Summary   2/9/2018    Modesta Bravo    MRN: [id number]           Patient Information     Date Of Birth          1965        Visit Information        Provider Department      2/9/2018 3:30 PM AYE NURSE AB [facility] [place]        Today's Diagnoses     Need for prophylactic vaccination and inoculation against influenza    -  1       Follow-ups after your visit        Who to contact     If you have questions or need follow up information about today's clinic visit or your schedule please contact [facility] directly at [phone].  Normal or non-critical lab and imaging results will be communicated to you by CareCloudhart, letter or phone within 4 business days after the clinic has received the results. If you do not hear from us within 7 days, please contact the clinic through [employer] or phone. If you have a critical or abnormal lab result, we will notify you by phone as soon as possible.  Submit refill requests through Notorious or call your pharmacy and they will forward the refill request to us. Please allow 3 business days for your refill to be completed.          Additional Information About Your Visit        MyChart Information     Notorious gives you secure access to your electronic health record. If you see a primary care provider, you can also send messages to your care team and make appointments. If you have questions, please call your primary care clinic.  If you do not have a primary care provider, please call [phone] and they will assist you.        Care EveryWhere ID     This is your Care EveryWhere ID. This could be used by other organizations to access your [facility] medical records  [id number]         Blood Pressure from Last 3 Encounters:   08/03/17 110/70   07/28/17 138/79   07/14/17 126/75    Weight from Last 3 Encounters:   08/03/17 216 lb 6.4 oz (98.2 kg)   07/26/17 220 lb (99.8 kg)   07/19/17 213 lb (96.6 kg)              We Performed the Following      FLU VAC, SPLIT VIRUS IM > 3 YO (QUADRIVALENT) [49721]     Vaccine Administration, Initial [60950]        Primary Care Provider Office Phone # Fax #    Toro Hugo [phone] [address] [place] [address]        Equal Access to Services     [facility]: Hadii aad ku hadasho Soomaali, waaxda luqadaha, qaybta kaalmada adeegyada, waxay románin hayaan adeshruthi sandraeunice lalance . So [facility] [phone].    ATENCIÓN: Si habla español, tiene a soler disposición servicios gratuitos de asistencia lingüística. [facility] [phone].    We comply with applicable federal civil rights laws and [place] laws. We do not discriminate on the basis of race, color, national origin, age, disability, sex, sexual orientation, or gender identity.            Thank you!     Thank you for choosing [facility] [place]  for your care. Our goal is always to provide you with excellent care. Hearing back from our patients is one way we can continue to improve our services. Please take a few minutes to complete the written survey that you may receive in the mail after your visit with us. Thank you!             Your Updated Medication List - Protect others around you: Learn how to safely use, store and throw away your medicines at www.disposemymeds.org.          This list is accurate as of 2/9/18  4:47 PM.  Always use your most recent med list.                   Brand Name Dispense Instructions for use Diagnosis    albuterol 108 (90 BASE) MCG/ACT Inhaler    PROAIR HFA/PROVENTIL HFA/VENTOLIN HFA    1 Inhaler    Inhale 2 puffs into the lungs every 4 hours as needed for shortness of breath / dyspnea or wheezing /chest tightness/cough    Asthma exacerbation, Cough       atorvastatin 20 MG tablet    LIPITOR    90 tablet    Take 1 tablet (20 mg) by mouth daily    Hyperlipidemia LDL goal <100       BABY ASPIRIN PO      Take 81 mg by mouth daily    Essential hypertension with goal blood pressure less than 140/90        benzonatate 100 MG capsule    TESSALON    42 capsule    Take 2 capsules (200 mg) by mouth 3 times daily as needed    Cough       buPROPion 150 MG 24 hr tablet    WELLBUTRIN XL    90 tablet    TAKE 1 TABLET(150 MG) BY MOUTH EVERY MORNING    Sexual dysfunction       cephALEXin 500 MG capsule    KEFLEX    28 capsule    Take 1 capsule (500 mg) by mouth 4 times daily    Endometritis       lisinopril-hydrochlorothiazide 20-25 MG per tablet    PRINZIDE/ZESTORETIC    90 tablet    TAKE 1 TABLET BY MOUTH DAILY    Essential hypertension with goal blood pressure less than 140/90       metroNIDAZOLE 500 MG tablet    FLAGYL    21 tablet    Take 1 tablet (500 mg) by mouth 3 times daily    Endometritis       ondansetron 4 MG ODT tab    ZOFRAN-ODT    30 tablet    Take 1 tablet (4 mg) by mouth every 6 hours as needed for nausea or vomiting    Endometritis       PRILOSEC PO           sertraline 50 MG tablet    ZOLOFT    90 tablet    Take 1 tablet (50 mg) by mouth daily    Anxiety       Vitamin D (Cholecalciferol) 1000 UNITS Tabs      Take 2,000 Units by mouth daily

## 2018-02-09 NOTE — PROGRESS NOTES
Injectable Influenza Immunization Documentation    1.  Is the person to be vaccinated sick today?   No    2. Does the person to be vaccinated have an allergy to a component   of the vaccine?   No  Egg Allergy Algorithm Link    3. Has the person to be vaccinated ever had a serious reaction   to influenza vaccine in the past?   No    4. Has the person to be vaccinated ever had Guillain-Barré syndrome?   No    Form completed by

## 2018-02-13 RX ORDER — ATORVASTATIN CALCIUM 20 MG/1
TABLET, FILM COATED ORAL
Qty: 30 TABLET | Refills: 0 | Status: SHIPPED | OUTPATIENT
Start: 2018-02-13 | End: 2018-03-21

## 2018-02-13 RX ORDER — LISINOPRIL AND HYDROCHLOROTHIAZIDE 20; 25 MG/1; MG/1
TABLET ORAL
Qty: 30 TABLET | Refills: 0 | Status: SHIPPED | OUTPATIENT
Start: 2018-02-13 | End: 2018-03-21

## 2018-02-13 NOTE — TELEPHONE ENCOUNTER
PRINZIDE 20-25      Last Written Prescription Date:  11/9/17  Last Fill Quantity: 90,   # refills: 0  Last Office Visit: 2/13/17  Future Office visit:   none    Medication is being filled for 1 time refill only due to:  Patient needs to be seen because it has been more than one year since last visit.   Pt due for annual, no appt scheduled. One month extension sent per [facility] protocol.

## 2018-02-13 NOTE — TELEPHONE ENCOUNTER
lipitor 20mg      Last Written Prescription Date:  2/13/17  Last Fill Quantity: 90,   # refills: 3  Last Office Visit: 2/13/17  Future Office visit:   none    Medication is being filled for 1 time refill only due to:  Patient needs to be seen because it has been more than one year since last visit.   Pt due for annual, no appt scheduled. One month extension sent per [facility] protocol.

## 2018-03-01 LAB — PAP SMEAR - HIM PATIENT REPORTED: NEGATIVE

## 2018-03-06 DIAGNOSIS — F41.9 ANXIETY: ICD-10-CM

## 2018-03-06 NOTE — TELEPHONE ENCOUNTER
sertraline (ZOLOFT) 50 MG tablet     Last Written Prescription Date:  2/13/17  Last Fill Quantity: 90,   # refills: 3  Last Office Visit with [facility] primary care provider:  Annual 2/13/17  Future Office visit: 3/27/18    Routing refill request to provider for review/approval because:  Refill sent pt has appointment.

## 2018-03-19 DIAGNOSIS — I10 ESSENTIAL HYPERTENSION WITH GOAL BLOOD PRESSURE LESS THAN 140/90: ICD-10-CM

## 2018-03-21 ENCOUNTER — TELEPHONE (OUTPATIENT)
Dept: OBGYN | Facility: CLINIC | Age: 53
End: 2018-03-21

## 2018-03-21 DIAGNOSIS — E78.5 HYPERLIPIDEMIA LDL GOAL <100: ICD-10-CM

## 2018-03-21 DIAGNOSIS — I10 ESSENTIAL HYPERTENSION WITH GOAL BLOOD PRESSURE LESS THAN 140/90: ICD-10-CM

## 2018-03-21 RX ORDER — LISINOPRIL AND HYDROCHLOROTHIAZIDE 20; 25 MG/1; MG/1
TABLET ORAL
Qty: 30 TABLET | Refills: 0 | OUTPATIENT
Start: 2018-03-21

## 2018-03-21 RX ORDER — ATORVASTATIN CALCIUM 20 MG/1
TABLET, FILM COATED ORAL
Qty: 30 TABLET | Refills: 0 | Status: SHIPPED | OUTPATIENT
Start: 2018-03-21 | End: 2018-03-27

## 2018-03-21 RX ORDER — LISINOPRIL AND HYDROCHLOROTHIAZIDE 20; 25 MG/1; MG/1
1 TABLET ORAL DAILY
Qty: 30 TABLET | Refills: 0 | Status: SHIPPED | OUTPATIENT
Start: 2018-03-21 | End: 2018-03-27

## 2018-03-21 NOTE — TELEPHONE ENCOUNTER
"Rx denied, duplicate. Rx was sent today (3/21/2018) for 30 tabs/0rf (3/21/2018 \"Refill Request\" telephone encounter).  "

## 2018-03-21 NOTE — TELEPHONE ENCOUNTER
Patient is waiting for 2 Rx, blood pressure and cholesterol to be filled.   Has concerns about BP med as she has run out.

## 2018-03-21 NOTE — TELEPHONE ENCOUNTER
"atorvastatin (LIPITOR) 20 MG tablet-take 1 tab PO daily  Last Written Prescription Date:  2018  Last Fill Quantity: 30 tabs,   # refills: 0  Last Office Visit with [facility] primary care provider:  8/3/2017-Problem Visit, 2017-Annual  Future Office visit: 3/27/2018-Annual with Dr. Painting      lisinopril-hydrochlorothiazide (PRINZIDE/ZESTORETIC) 20-25 MG per tablet-take 1 tab PO daily      Last Written Prescription Date:  2018  Last Fill Quantity: 30 tabs,   # refills: 0  Last Office Visit with [facility] primary care provider:  8/3/2017-Problem Visit, 2017-Annual  Future Office visit: 3/27/2018-Annual with Dr. Painting    Pt has already received an 30 day extension for each Rx. Pt has her annual exam scheduled with Dr. Painting for 3/27/2018. Pt states that she has been out of her blood pressure Rx for 3 days and is \"feeling like I need to get that filled.\" Pt states that her mother  recently and she is a little stressed and that her BP is probably high. Pt denies chest pain or shortness or breath. Relayed to pt for future to call us before she is out of Rx, pt states that she called days ago and that also her pharmacy has tried calling a couple times. Informed can send one month supply of each Rx and recommend that she pick them up today. Pt verbalized understanding. Both Rx's sent to pharmacy for 30 tabs/0rf.  "

## 2018-03-27 ENCOUNTER — RADIANT APPOINTMENT (OUTPATIENT)
Dept: MAMMOGRAPHY | Facility: CLINIC | Age: 53
End: 2018-03-27
Payer: COMMERCIAL

## 2018-03-27 ENCOUNTER — OFFICE VISIT (OUTPATIENT)
Dept: OBGYN | Facility: CLINIC | Age: 53
End: 2018-03-27
Payer: COMMERCIAL

## 2018-03-27 VITALS
WEIGHT: 208 LBS | SYSTOLIC BLOOD PRESSURE: 110 MMHG | DIASTOLIC BLOOD PRESSURE: 88 MMHG | HEIGHT: 67 IN | BODY MASS INDEX: 32.65 KG/M2

## 2018-03-27 DIAGNOSIS — Z12.11 SCREEN FOR COLON CANCER: ICD-10-CM

## 2018-03-27 DIAGNOSIS — Z01.419 ENCOUNTER FOR GYNECOLOGICAL EXAMINATION WITHOUT ABNORMAL FINDING: Primary | ICD-10-CM

## 2018-03-27 DIAGNOSIS — I10 ESSENTIAL HYPERTENSION WITH GOAL BLOOD PRESSURE LESS THAN 140/90: ICD-10-CM

## 2018-03-27 DIAGNOSIS — Z13.6 SCREENING FOR CARDIOVASCULAR CONDITION: ICD-10-CM

## 2018-03-27 DIAGNOSIS — Z13.21 ENCOUNTER FOR VITAMIN DEFICIENCY SCREENING: ICD-10-CM

## 2018-03-27 DIAGNOSIS — F41.9 ANXIETY: ICD-10-CM

## 2018-03-27 DIAGNOSIS — Z13.228 SCREENING FOR METABOLIC DISORDER: ICD-10-CM

## 2018-03-27 DIAGNOSIS — E78.5 HYPERLIPIDEMIA LDL GOAL <100: ICD-10-CM

## 2018-03-27 DIAGNOSIS — R37 SEXUAL DYSFUNCTION: ICD-10-CM

## 2018-03-27 DIAGNOSIS — Z12.31 VISIT FOR SCREENING MAMMOGRAM: ICD-10-CM

## 2018-03-27 LAB
ALBUMIN SERPL-MCNC: 4.3 G/DL (ref 3.4–5)
ALP SERPL-CCNC: 65 U/L (ref 40–150)
ALT SERPL W P-5'-P-CCNC: 66 U/L (ref 0–50)
ANION GAP SERPL CALCULATED.3IONS-SCNC: 8 MMOL/L (ref 3–14)
AST SERPL W P-5'-P-CCNC: 43 U/L (ref 0–45)
BILIRUB SERPL-MCNC: 0.6 MG/DL (ref 0.2–1.3)
BUN SERPL-MCNC: 16 MG/DL (ref 7–30)
CALCIUM SERPL-MCNC: 9.7 MG/DL (ref 8.5–10.1)
CHLORIDE SERPL-SCNC: 100 MMOL/L (ref 94–109)
CHOLEST SERPL-MCNC: 205 MG/DL
CO2 SERPL-SCNC: 29 MMOL/L (ref 20–32)
CREAT SERPL-MCNC: 0.79 MG/DL (ref 0.52–1.04)
GFR SERPL CREATININE-BSD FRML MDRD: 76 ML/MIN/1.7M2
GLUCOSE SERPL-MCNC: 101 MG/DL (ref 70–99)
HDLC SERPL-MCNC: 54 MG/DL
LDLC SERPL CALC-MCNC: 120 MG/DL
NONHDLC SERPL-MCNC: 151 MG/DL
POTASSIUM SERPL-SCNC: 4 MMOL/L (ref 3.4–5.3)
PROT SERPL-MCNC: 8.2 G/DL (ref 6.8–8.8)
SODIUM SERPL-SCNC: 137 MMOL/L (ref 133–144)
TRIGL SERPL-MCNC: 156 MG/DL

## 2018-03-27 PROCEDURE — 77067 SCR MAMMO BI INCL CAD: CPT | Mod: TC

## 2018-03-27 PROCEDURE — 77063 BREAST TOMOSYNTHESIS BI: CPT | Mod: TC

## 2018-03-27 PROCEDURE — 82306 VITAMIN D 25 HYDROXY: CPT | Performed by: OBSTETRICS & GYNECOLOGY

## 2018-03-27 PROCEDURE — 99396 PREV VISIT EST AGE 40-64: CPT | Performed by: OBSTETRICS & GYNECOLOGY

## 2018-03-27 PROCEDURE — 99213 OFFICE O/P EST LOW 20 MIN: CPT | Mod: 25 | Performed by: OBSTETRICS & GYNECOLOGY

## 2018-03-27 PROCEDURE — 36415 COLL VENOUS BLD VENIPUNCTURE: CPT | Performed by: OBSTETRICS & GYNECOLOGY

## 2018-03-27 PROCEDURE — 80053 COMPREHEN METABOLIC PANEL: CPT | Performed by: OBSTETRICS & GYNECOLOGY

## 2018-03-27 PROCEDURE — 80061 LIPID PANEL: CPT | Performed by: OBSTETRICS & GYNECOLOGY

## 2018-03-27 RX ORDER — LISINOPRIL AND HYDROCHLOROTHIAZIDE 20; 25 MG/1; MG/1
1 TABLET ORAL DAILY
Qty: 90 TABLET | Refills: 3 | Status: SHIPPED | OUTPATIENT
Start: 2018-03-27 | End: 2019-04-02

## 2018-03-27 RX ORDER — BUPROPION HYDROCHLORIDE 300 MG/1
TABLET ORAL
Qty: 90 TABLET | Refills: 0 | Status: SHIPPED | OUTPATIENT
Start: 2018-03-27 | End: 2018-07-02

## 2018-03-27 RX ORDER — ATORVASTATIN CALCIUM 20 MG/1
TABLET, FILM COATED ORAL
Qty: 90 TABLET | Refills: 3 | Status: SHIPPED | OUTPATIENT
Start: 2018-03-27 | End: 2019-04-02

## 2018-03-27 ASSESSMENT — ANXIETY QUESTIONNAIRES
5. BEING SO RESTLESS THAT IT IS HARD TO SIT STILL: SEVERAL DAYS
6. BECOMING EASILY ANNOYED OR IRRITABLE: SEVERAL DAYS
1. FEELING NERVOUS, ANXIOUS, OR ON EDGE: NOT AT ALL
GAD7 TOTAL SCORE: 5
7. FEELING AFRAID AS IF SOMETHING AWFUL MIGHT HAPPEN: NOT AT ALL
3. WORRYING TOO MUCH ABOUT DIFFERENT THINGS: SEVERAL DAYS
2. NOT BEING ABLE TO STOP OR CONTROL WORRYING: SEVERAL DAYS
IF YOU CHECKED OFF ANY PROBLEMS ON THIS QUESTIONNAIRE, HOW DIFFICULT HAVE THESE PROBLEMS MADE IT FOR YOU TO DO YOUR WORK, TAKE CARE OF THINGS AT HOME, OR GET ALONG WITH OTHER PEOPLE: SOMEWHAT DIFFICULT

## 2018-03-27 ASSESSMENT — PATIENT HEALTH QUESTIONNAIRE - PHQ9: 5. POOR APPETITE OR OVEREATING: SEVERAL DAYS

## 2018-03-27 NOTE — MR AVS SNAPSHOT
After Visit Summary   3/27/2018    Modesta Bravo    MRN: [id number]           Patient Information     Date Of Birth          1965        Visit Information        Provider Department      3/27/2018 9:10 AM Sergei Painting MD [facility] [place]        Today's Diagnoses     Encounter for gynecological examination without abnormal finding    -  1    Essential hypertension with goal blood pressure less than 140/90        Hyperlipidemia LDL goal <100        Anxiety        Sexual dysfunction        Screening for cardiovascular condition        Screening for metabolic disorder        Encounter for vitamin deficiency screening        Screen for colon cancer           Follow-ups after your visit        Additional Services     GASTROENTEROLOGY ADULT REF PROCEDURE ONLY Dario Hale [phone]; No Provider Preference       Last Lab Result: Creatinine (mg/dL)       Date                     Value                 07/27/2017               0.79             ----------  Body mass index is 32.82 kg/(m^2).     Needed:  No  Language:  English    Patient will be contacted to schedule procedure.     Please be aware that coverage of these services is subject to the terms and limitations of your health insurance plan.  Call member services at your health plan with any benefit or coverage questions.  Any procedures must be performed at a [facility] facility OR coordinated by your clinic's referral office.    Please bring the following with you to your appointment:    (1) Any X-Rays, CTs or MRIs which have been performed.  Contact the facility where they were done to arrange for  prior to your scheduled appointment.    (2) List of current medications   (3) This referral request   (4) Any documents/labs given to you for this referral                  Who to contact     If you have questions or need follow up information about today's clinic visit or your schedule please contact  "[facility] directly at [phone].  Normal or non-critical lab and imaging results will be communicated to you by MyChart, letter or phone within 4 business days after the clinic has received the results. If you do not hear from us within 7 days, please contact the clinic through [employer] or phone. If you have a critical or abnormal lab result, we will notify you by phone as soon as possible.  Submit refill requests through [email or web address] or call your pharmacy and they will forward the refill request to us. Please allow 3 business days for your refill to be completed.          Additional Information About Your Visit        [employer] Information     [email or web address] gives you secure access to your electronic health record. If you see a primary care provider, you can also send messages to your care team and make appointments. If you have questions, please call your primary care clinic.  If you do not have a primary care provider, please call [phone] and they will assist you.        Care EveryWhere ID     This is your Care EveryWhere ID. This could be used by other organizations to access your [place] medical records  [id number]        Your Vitals Were     Height Last Period BMI (Body Mass Index)             5' 6.75\" (1.695 m) 03/26/2018 (Exact Date) 32.82 kg/m2          Blood Pressure from Last 3 Encounters:   03/27/18 110/88   08/03/17 110/70   07/28/17 138/79    Weight from Last 3 Encounters:   03/27/18 208 lb (94.3 kg)   08/03/17 216 lb 6.4 oz (98.2 kg)   07/26/17 220 lb (99.8 kg)              We Performed the Following     *MA Screening Digital Bilateral     Comprehensive metabolic panel     GASTROENTEROLOGY ADULT REF PROCEDURE ONLY Dario Brockierge [phone]; No Provider Preference     Lipid panel reflex to direct LDL Fasting     Vitamin D Deficiency          Today's Medication Changes          These changes are accurate as of 3/27/18 10:16 AM.  If you have any questions, ask your nurse or " doctor.               These medicines have changed or have updated prescriptions.        Dose/Directions    buPROPion 300 MG 24 hr tablet   Commonly known as:  WELLBUTRIN XL   This may have changed:  medication strength   Used for:  Sexual dysfunction   Changed by:  Sergei Painting MD        TAKE 1 TABLET(150 MG) BY MOUTH EVERY MORNING   Quantity:  90 tablet   Refills:  0       sertraline 50 MG tablet   Commonly known as:  ZOLOFT   This may have changed:  See the new instructions.   Used for:  Anxiety   Changed by:  Sergei Painting MD        TAKE 1 TABLET(50 MG) BY MOUTH DAILY   Quantity:  90 tablet   Refills:  3            Where to get your medicines      These medications were sent to [id number] - [place], [place] - [address]  [address]  [address] [place] [address]     Phone:  [phone]     atorvastatin 20 MG tablet    buPROPion 300 MG 24 hr tablet    lisinopril-hydrochlorothiazide 20-25 MG per tablet    sertraline 50 MG tablet                Primary Care Provider Office Phone # Fax #    Toro Jeff Hugo [phone] [address] [place] [address]        Equal Access to Services     [facility]: Hadii lucy ku hadasho Soomaali, waaxda luqadaha, qaybta kaalmada adeshruthiyaclaudine, penelope david . So [facility] [phone].    ATENCIÓN: Si habla español, tiene a soler disposición servicios gratuitos de asistencia lingüística. [facility] [phone].    We comply with applicable federal civil rights laws and [place] laws. We do not discriminate on the basis of race, color, national origin, age, disability, sex, sexual orientation, or gender identity.            Thank you!     Thank you for choosing [facility]  for your care. Our goal is always to provide you with excellent care. Hearing back from our patients is one way we can continue to improve our services. Please take a few  minutes to complete the written survey that you may receive in the mail after your visit with us. Thank you!             Your Updated Medication List - Protect others around you: Learn how to safely use, store and throw away your medicines at www.disposemymeds.org.          This list is accurate as of 3/27/18 10:16 AM.  Always use your most recent med list.                   Brand Name Dispense Instructions for use Diagnosis    atorvastatin 20 MG tablet    LIPITOR    90 tablet    TAKE 1 TABLET(20 MG) BY MOUTH DAILY    Hyperlipidemia LDL goal <100       BABY ASPIRIN PO      Take 81 mg by mouth daily    Essential hypertension with goal blood pressure less than 140/90       benzonatate 100 MG capsule    TESSALON    42 capsule    Take 2 capsules (200 mg) by mouth 3 times daily as needed    Cough       buPROPion 300 MG 24 hr tablet    WELLBUTRIN XL    90 tablet    TAKE 1 TABLET(150 MG) BY MOUTH EVERY MORNING    Sexual dysfunction       lisinopril-hydrochlorothiazide 20-25 MG per tablet    PRINZIDE/ZESTORETIC    90 tablet    Take 1 tablet by mouth daily    Essential hypertension with goal blood pressure less than 140/90       sertraline 50 MG tablet    ZOLOFT    90 tablet    TAKE 1 TABLET(50 MG) BY MOUTH DAILY    Anxiety       Vitamin D (Cholecalciferol) 1000 UNITS Tabs      Take 2,000 Units by mouth daily

## 2018-03-28 LAB — DEPRECATED CALCIDIOL+CALCIFEROL SERPL-MC: 62 UG/L (ref 20–75)

## 2018-03-28 ASSESSMENT — PATIENT HEALTH QUESTIONNAIRE - PHQ9: SUM OF ALL RESPONSES TO PHQ QUESTIONS 1-9: 3

## 2018-03-28 ASSESSMENT — ANXIETY QUESTIONNAIRES: GAD7 TOTAL SCORE: 5

## 2018-03-29 NOTE — PROGRESS NOTES
Lipids are abnormal, FBS abnormal and LFTs abnormal. I think she will be better served with a PCP to manage the meds. May need dose change or med change.

## 2018-04-06 ENCOUNTER — RADIANT APPOINTMENT (OUTPATIENT)
Dept: GENERAL RADIOLOGY | Facility: CLINIC | Age: 53
End: 2018-04-06
Attending: INTERNAL MEDICINE
Payer: COMMERCIAL

## 2018-04-06 ENCOUNTER — OFFICE VISIT (OUTPATIENT)
Dept: PEDIATRICS | Facility: CLINIC | Age: 53
End: 2018-04-06
Payer: COMMERCIAL

## 2018-04-06 VITALS
HEIGHT: 67 IN | SYSTOLIC BLOOD PRESSURE: 120 MMHG | HEART RATE: 89 BPM | DIASTOLIC BLOOD PRESSURE: 78 MMHG | WEIGHT: 208 LBS | OXYGEN SATURATION: 95 % | TEMPERATURE: 97.6 F | BODY MASS INDEX: 32.65 KG/M2

## 2018-04-06 DIAGNOSIS — Z87.898 HX OF SOLITARY PULMONARY NODULE: ICD-10-CM

## 2018-04-06 DIAGNOSIS — H91.92 DECREASED HEARING OF LEFT EAR: ICD-10-CM

## 2018-04-06 DIAGNOSIS — E78.5 HYPERLIPIDEMIA, UNSPECIFIED HYPERLIPIDEMIA TYPE: ICD-10-CM

## 2018-04-06 DIAGNOSIS — Z87.81 HISTORY OF RIB FRACTURE: ICD-10-CM

## 2018-04-06 DIAGNOSIS — R68.82 DECREASED SEX DRIVE: ICD-10-CM

## 2018-04-06 DIAGNOSIS — Z87.81 HISTORY OF RIB FRACTURE: Primary | ICD-10-CM

## 2018-04-06 LAB
ESTRADIOL SERPL-MCNC: 81 PG/ML
PROGEST SERPL-MCNC: 1.7 NG/ML
TSH SERPL DL<=0.005 MIU/L-ACNC: 2.39 MU/L (ref 0.4–4)

## 2018-04-06 PROCEDURE — 71046 X-RAY EXAM CHEST 2 VIEWS: CPT

## 2018-04-06 PROCEDURE — 36415 COLL VENOUS BLD VENIPUNCTURE: CPT | Performed by: INTERNAL MEDICINE

## 2018-04-06 PROCEDURE — 82670 ASSAY OF TOTAL ESTRADIOL: CPT | Performed by: INTERNAL MEDICINE

## 2018-04-06 PROCEDURE — 84403 ASSAY OF TOTAL TESTOSTERONE: CPT | Performed by: INTERNAL MEDICINE

## 2018-04-06 PROCEDURE — 84144 ASSAY OF PROGESTERONE: CPT | Performed by: INTERNAL MEDICINE

## 2018-04-06 PROCEDURE — 84443 ASSAY THYROID STIM HORMONE: CPT | Performed by: INTERNAL MEDICINE

## 2018-04-06 PROCEDURE — 99214 OFFICE O/P EST MOD 30 MIN: CPT | Performed by: INTERNAL MEDICINE

## 2018-04-06 RX ORDER — MULTIPLE VITAMINS W/ MINERALS TAB 9MG-400MCG
1 TAB ORAL DAILY
COMMUNITY
End: 2019-04-02

## 2018-04-06 NOTE — PROGRESS NOTES
SUBJECTIVE:   Modesta Bravo is a 52 year old female who presents to clinic today for the following health issues:      Wants to maintain medications through a primary.       HEARING FREQUENCY    Right Ear:      1000 Hz RESPONSE- on Level: 40 db (Conditioning sound)   1000 Hz: RESPONSE- on Level:   20 db    2000 Hz: RESPONSE- on Level:   20 db    4000 Hz: RESPONSE- on Level:   20 db    6000 Hz: RESPONSE- on Level:   35 db  Left Ear:      6000 Hz: RESPONSE- on Level:   No response    4000 Hz: RESPONSE- on Level: 30 db   2000 Hz: RESPONSE- on Level: 30 db   1000 Hz: RESPONSE- on Level:   20 db     500 Hz: RESPONSE- on Level: 25 db    Right Ear:    500 Hz: RESPONSE- on Level: 25 db    Hearing Acuity: Pass    Hearing Assessment: lachelle patient presents to discuss several issues today.  She would like her hearing evaluated to see if she is having any changes.  She has had no congestion no recent cough or shortness of breath-Feels that may be slightly worse on the left side.    She has a history of prior rib fracture in the past.  Prior CT scan noted a 0.4 cm nodule with no need for follow-up based on patient's risk factors with no smoking.  She is wondering about repeat imaging on this.  She has had no chest pain palpitations shortness of breath.  She is curious about what it shows today.    Patient's been noted to have hyperlipidemia she is currently starting a new exercise routine going to the gym multiple times per week.  She would like to see what changes she is able to accomplish on her own.  She has been on atorvastatin 20 mg per day.    Mood, sex drive: Patient's noted decreased sex drive she has been on Zoloft 50 mg per day.  She is wondering about other medications to help out in wondering if she should be on estrogen therapy.  Blood pressure: Patient's been on lisinopril hydrochlorothiazide.  Blood pressures have been well controlled in the 120s.  No palpitations no lightheadedness no dizziness.  She feels  "that she eats a low salt diet.  She starting exercise regimen as noted above.            Problem list and histories reviewed & adjusted, as indicated.  Additional history: as documented    Patient Active Problem List   Diagnosis     Hyperlipidemia     Hyperparathyroidism (H)     Hypertension goal BP (blood pressure) < 140/90     Anxiety     Cough     Endometritis     Past Surgical History:   Procedure Laterality Date     GYN SURGERY       NO HISTORY OF SURGERY         Social History   Substance Use Topics     Smoking status: Never Smoker     Smokeless tobacco: Never Used     Alcohol use 0.0 oz/week     0 Standard drinks or equivalent per week      Comment: daily 1 glass wine     Family History   Problem Relation Age of Onset     Hyperlipidemia Mother      Hypertension Mother      DIABETES Mother      Hyperlipidemia Father      DIABETES Father      Hypertension Father      chf 73     Breast Cancer Sister 47     pt is BRCA neg     Breast Cancer Maternal Grandmother      Breast Cancer Sister            Reviewed and updated as needed this visit by clinical staff       Reviewed and updated as needed this visit by Provider         ROS:  Constitutional, HEENT, cardiovascular, pulmonary, GI, , musculoskeletal, neuro, skin, endocrine and psych systems are negative, except as otherwise noted.    OBJECTIVE:     /78 (BP Location: Right arm, Cuff Size: Adult Large)  Pulse 89  Temp 97.6  F (36.4  C) (Oral)  Ht 5' 6.75\" (1.695 m)  Wt 208 lb (94.3 kg)  LMP 03/26/2018 (Exact Date)  SpO2 95%  BMI 32.82 kg/m2  Body mass index is 32.82 kg/(m^2).  GENERAL: healthy, alert and no distress  EYES: Eyes grossly normal to inspection, PERRL and conjunctivae and sclerae normal  NECK: no adenopathy, no asymmetry, masses, or scars and thyroid normal to palpation  RESP: lungs clear to auscultation - no rales, rhonchi or wheezes  CV: regular rate and rhythm, normal S1 S2, no S3 or S4, no murmur, click or rub, no peripheral edema and " peripheral pulses strong  ABDOMEN: soft, nontender, no hepatosplenomegaly, no masses and bowel sounds normal  MS: no gross musculoskeletal defects noted, no edema  SKIN: no suspicious lesions or rashes  NEURO: Normal strength and tone, mentation intact and speech normal  PSYCH: mentation appears normal, affect normal/bright    Diagnostic Test Results:  pending    ASSESSMENT/PLAN:       ICD-10-CM    1. History of rib fracture Z87.81 XR Chest 2 Views   2. Hx of solitary pulmonary nodule Z87.898 XR Chest 2 Views   3. Decreased sex drive R68.82 Testosterone total     Estradiol     Progesterone     TSH with free T4 reflex   4. Hyperlipidemia, unspecified hyperlipidemia type E78.5 Comprehensive metabolic panel     Lipid panel reflex to direct LDL Fasting  Repeat after exercise routine for 2-3 months, consider increasing atorvastatin    5. Decreased hearing of left ear H91.92 Will continue to follow, consider ENT evaluation     Patient Instructions   1) Xray of the lungs downstairs today    2) We will see back in 3 months and do fasting labs at that time    3) Let me know if new issues come up    4) Labs downstairs today    Things we can consider  - changing from zoloft to other medication  - decreasing the lisinopril-hydrochlorothiazide down to half dose     Send me a my chart on how blood pressure is doing in the next couple of weeks.    MD Toro Dyer MD, MD  [facility] [place]

## 2018-04-06 NOTE — MR AVS SNAPSHOT
After Visit Summary   4/6/2018    Modesta Bravo    MRN: [id number]           Patient Information     Date Of Birth          1965        Visit Information        Provider Department      4/6/2018 7:50 AM Toro Hugo MD [facility]        Today's Diagnoses     History of rib fracture    -  1    Hx of solitary pulmonary nodule        Decreased sex drive        Hyperlipidemia, unspecified hyperlipidemia type          Care Instructions    1) Xray of the lungs downstairs today    2) We will see back in 3 months and do fasting labs at that time    3) Let me know if new issues come up    4) Labs downstairs today    Things we can consider  - changing from zoloft to other medication  - decreasing the lisinopril-hydrochlorothiazide down to half dose     Send me a my chart on how blood pressure is doing in the next couple of weeks.    Toro Hugo MD          Follow-ups after your visit        Follow-up notes from your care team     Return in about 3 months (around 7/6/2018).      Your next 10 appointments already scheduled     Apr 06, 2018  8:35 AM CDT   (Arrive by 8:20 AM)   XR CHEST 2 VIEWS with [id number]   [facility] ([facility])    [address] [place] [id number] [phone]           Please bring a list of your current medicines to your exam. (Include vitamins, minerals and over-thecounter medicines.) Leave your valuables at home.  Tell your doctor if there is a chance you may be pregnant.  You do not need to do anything special for this exam.              Future tests that were ordered for you today     Open Future Orders        Priority Expected Expires Ordered    Comprehensive metabolic panel Routine  4/6/2019 4/6/2018    Lipid panel reflex to direct LDL Fasting Routine  4/6/2019 4/6/2018            Who to contact     If you have questions or need follow up information about today's clinic visit or your schedule please contact  "[facility] directly at [phone].  Normal or non-critical lab and imaging results will be communicated to you by MyChart, letter or phone within 4 business days after the clinic has received the results. If you do not hear from us within 7 days, please contact the clinic through [employer] or phone. If you have a critical or abnormal lab result, we will notify you by phone as soon as possible.  Submit refill requests through [employer] or call your pharmacy and they will forward the refill request to us. Please allow 3 business days for your refill to be completed.          Additional Information About Your Visit        [employer] Information     [employer] gives you secure access to your electronic health record. If you see a primary care provider, you can also send messages to your care team and make appointments. If you have questions, please call your primary care clinic.  If you do not have a primary care provider, please call [phone] and they will assist you.        Care EveryWhere ID     This is your Care EveryWhere ID. This could be used by other organizations to access your [facility] medical records  [id number]        Your Vitals Were     Pulse Temperature Height Last Period Pulse Oximetry BMI (Body Mass Index)    89 97.6  F (36.4  C) (Oral) 5' 6.75\" (1.695 m) 03/26/2018 (Exact Date) 95% 32.82 kg/m2       Blood Pressure from Last 3 Encounters:   04/06/18 120/78   03/27/18 110/88   08/03/17 110/70    Weight from Last 3 Encounters:   04/06/18 208 lb (94.3 kg)   03/27/18 208 lb (94.3 kg)   08/03/17 216 lb 6.4 oz (98.2 kg)              We Performed the Following     Estradiol     Progesterone     Testosterone total     TSH with free T4 reflex        Primary Care Provider Office Phone # Fax #    Toro Hugo [phone] [address] [place] [address]        Equal Access to Services     TANYA PANTOJA AH: Hadii lucy Kenyon, waaxda luqadaha, qaybta " penelope pickettshruthi david ah. Wilda [facility] [phone].    ATENCIÓN: Si yousif snyder, tiene a soler disposición servicios gratuitos de asistencia lingüística. Calvin al [phone].    We comply with applicable federal civil rights laws and [place] laws. We do not discriminate on the basis of race, color, national origin, age, disability, sex, sexual orientation, or gender identity.            Thank you!     Thank you for choosing [facility]  for your care. Our goal is always to provide you with excellent care. Hearing back from our patients is one way we can continue to improve our services. Please take a few minutes to complete the written survey that you may receive in the mail after your visit with us. Thank you!             Your Updated Medication List - Protect others around you: Learn how to safely use, store and throw away your medicines at www.disposemymeds.org.          This list is accurate as of 4/6/18  8:32 AM.  Always use your most recent med list.                   Brand Name Dispense Instructions for use Diagnosis    atorvastatin 20 MG tablet    LIPITOR    90 tablet    TAKE 1 TABLET(20 MG) BY MOUTH DAILY    Hyperlipidemia LDL goal <100       BABY ASPIRIN PO      Take 81 mg by mouth daily    Essential hypertension with goal blood pressure less than 140/90       buPROPion 300 MG 24 hr tablet    WELLBUTRIN XL    90 tablet    TAKE 1 TABLET(150 MG) BY MOUTH EVERY MORNING    Sexual dysfunction       lisinopril-hydrochlorothiazide 20-25 MG per tablet    PRINZIDE/ZESTORETIC    90 tablet    Take 1 tablet by mouth daily    Essential hypertension with goal blood pressure less than 140/90       Multi-vitamin Tabs tablet      Take 1 tablet by mouth daily        sertraline 50 MG tablet    ZOLOFT    90 tablet    TAKE 1 TABLET(50 MG) BY MOUTH DAILY    Anxiety       Vitamin D (Cholecalciferol) 1000 UNITS Tabs      Take 3,000 Units by mouth daily

## 2018-04-06 NOTE — PATIENT INSTRUCTIONS
1) Xray of the lungs downstairs today    2) We will see back in 3 months and do fasting labs at that time    3) Let me know if new issues come up    4) Labs downstairs today    Things we can consider  - changing from zoloft to other medication  - decreasing the lisinopril-hydrochlorothiazide down to half dose     Send me a my chart on how blood pressure is doing in the next couple of weeks.    Toro Hugo MD

## 2018-04-10 LAB — TESTOST SERPL-MCNC: 17 NG/DL (ref 8–60)

## 2018-04-17 DIAGNOSIS — I10 ESSENTIAL HYPERTENSION WITH GOAL BLOOD PRESSURE LESS THAN 140/90: ICD-10-CM

## 2018-04-17 DIAGNOSIS — E78.5 HYPERLIPIDEMIA LDL GOAL <100: ICD-10-CM

## 2018-04-19 RX ORDER — ATORVASTATIN CALCIUM 20 MG/1
TABLET, FILM COATED ORAL
Qty: 30 TABLET | Refills: 0 | OUTPATIENT
Start: 2018-04-19

## 2018-04-19 RX ORDER — LISINOPRIL AND HYDROCHLOROTHIAZIDE 20; 25 MG/1; MG/1
TABLET ORAL
Qty: 30 TABLET | Refills: 0 | OUTPATIENT
Start: 2018-04-19

## 2018-04-19 NOTE — TELEPHONE ENCOUNTER
"Requested Prescriptions   Pending Prescriptions Disp Refills     atorvastatin (LIPITOR) 20 MG tablet [Pharmacy Med Name: ATORVASTATIN 20MG TABLETS] 30 tablet 0     Sig: TAKE 1 TABLET BY MOUTH DAILY.   Last Written Prescription Date:  3/27/18  Last Fill Quantity: 90,  # refills: 3   Last office visit: 3/27/2018 with prescribing provider:  Dr. Painting   Future Office Visit:        Statins Protocol Passed    4/17/2018  6:18 PM       Passed - LDL on file in past 12 months    Recent Labs   Lab Test  03/27/18   0937   LDL  120*            Passed - No abnormal creatine kinase in past 12 months    No lab results found.            Passed - Recent (12 mo) or future (30 days) visit within the authorizing provider's specialty    Patient had office visit in the last 12 months or has a visit in the next 30 days with authorizing provider or within the authorizing provider's specialty.  See \"Patient Info\" tab in inbasket, or \"Choose Columns\" in Meds & Orders section of the refill encounter.           Passed - Patient is age 18 or older       Passed - No active pregnancy on record       Passed - No positive pregnancy test in past 12 months        lisinopril-hydrochlorothiazide (PRINZIDE/ZESTORETIC) 20-25 MG per tablet [Pharmacy Med Name: LISINOPRIL-HCTZ 20/25MG TABLETS] 30 tablet 0     Sig: TAKE 1 TABLET BY MOUTH DAILY. Last Written Prescription Date:  3/27/18  Last Fill Quantity: 90,  # refills: 3   Last office visit: 3/27/2018 with prescribing provider:  Dr. Painting   Future Office Visit:      Diuretics (Including Combos) Protocol Passed    4/17/2018  6:18 PM       Passed - Blood pressure under 140/90 in past 12 months    BP Readings from Last 3 Encounters:   04/06/18 120/78   03/27/18 110/88   08/03/17 110/70                Passed - Recent (12 mo) or future (30 days) visit within the authorizing provider's specialty    Patient had office visit in the last 12 months or has a visit in the next 30 days with authorizing provider or " "within the authorizing provider's specialty.  See \"Patient Info\" tab in inbasket, or \"Choose Columns\" in Meds & Orders section of the refill encounter.           Passed - Patient is age 18 or older       Passed - No active pregancy on record       Passed - Normal serum creatinine on file in past 12 months    Recent Labs   Lab Test  03/27/18   0937   CR  0.79             Passed - Normal serum potassium on file in past 12 months    Recent Labs   Lab Test  03/27/18   0937   POTASSIUM  4.0                   Passed - Normal serum sodium on file in past 12 months    Recent Labs   Lab Test  03/27/18   0937   NA  137             Passed - No positive pregnancy test in past 12 months      Refill available at pharmacy.  Request refused as \"duplicate\" sent back to pharmacy.    "

## 2018-05-23 ENCOUNTER — TELEPHONE (OUTPATIENT)
Dept: PEDIATRICS | Facility: CLINIC | Age: 53
End: 2018-05-23

## 2018-05-23 NOTE — TELEPHONE ENCOUNTER
Type of outreach:  Sent "[employer] message.  Health Maintenance Due   Topic Date Due     HIV SCREEN (SYSTEM ASSIGNED)  11/09/1983     HEPATITIS C SCREENING  11/09/1983     COLON CANCER SCREEN (SYSTEM ASSIGNED)  11/09/2015     Katiuska Blackwood MA

## 2018-10-03 DIAGNOSIS — R37 SEXUAL DYSFUNCTION: ICD-10-CM

## 2018-10-03 RX ORDER — BUPROPION HYDROCHLORIDE 300 MG/1
TABLET ORAL
Qty: 90 TABLET | Refills: 0 | Status: SHIPPED | OUTPATIENT
Start: 2018-10-03 | End: 2018-12-28

## 2018-10-03 NOTE — TELEPHONE ENCOUNTER
"Requested Prescriptions   Pending Prescriptions Disp Refills     buPROPion (WELLBUTRIN XL) 300 MG 24 hr tablet [Pharmacy Med Name: BUPROPION XL 300MG TABLETS] 90 tablet 0     Sig: TAKE 1 TABLET BY MOUTH EVERY MORNING.    SSRIs Protocol Passed    10/3/2018  9:46 AM       Passed - Recent (12 mo) or future (30 days) visit within the authorizing provider's specialty    Patient had office visit in the last 12 months or has a visit in the next 30 days with authorizing provider or within the authorizing provider's specialty.  See \"Patient Info\" tab in inbasket, or \"Choose Columns\" in Meds & Orders section of the refill encounter.           Passed - Medication is Bupropion    If the medication is Bupropion (Wellbutrin), and the patient is taking for smoking cessation; OK to refill.         Passed - Patient is age 18 or older       Passed - No active pregnancy on record       Passed - No positive pregnancy test in last 12 months        Last Written Prescription Date:  7/3/18  Last Fill Quantity: 90,  # refills: 0   Last office visit: 3/27/2018 with prescribing provider:  Mert Annual exam   Future Office Visit:      "

## 2018-12-28 DIAGNOSIS — R37 SEXUAL DYSFUNCTION: ICD-10-CM

## 2018-12-28 RX ORDER — BUPROPION HYDROCHLORIDE 300 MG/1
TABLET ORAL
Qty: 90 TABLET | Refills: 0 | Status: SHIPPED | OUTPATIENT
Start: 2018-12-28 | End: 2019-04-02

## 2018-12-28 NOTE — TELEPHONE ENCOUNTER
"Requested Prescriptions   Pending Prescriptions Disp Refills     buPROPion (WELLBUTRIN XL) 300 MG 24 hr tablet [Pharmacy Med Name: BUPROPION XL 300MG TABLETS] 90 tablet 0     Sig: TAKE 1 TABLET BY MOUTH EVERY MORNING.    SSRIs Protocol Passed - 12/28/2018 10:30 AM       Passed - Recent (12 mo) or future (30 days) visit within the authorizing provider's specialty    Patient had office visit in the last 12 months or has a visit in the next 30 days with authorizing provider or within the authorizing provider's specialty.  See \"Patient Info\" tab in inbasket, or \"Choose Columns\" in Meds & Orders section of the refill encounter.             Passed - Medication is Bupropion    If the medication is Bupropion (Wellbutrin), and the patient is taking for smoking cessation; OK to refill.         Passed - Patient is age 18 or older       Passed - No active pregnancy on record       Passed - No positive pregnancy test in last 12 months          Prescription approved per [facility] Refill Protocol.  Yaneth Chung RN on 12/28/2018 at 11:11 AM      "

## 2019-01-02 ENCOUNTER — TELEPHONE (OUTPATIENT)
Dept: PEDIATRICS | Facility: CLINIC | Age: 54
End: 2019-01-02

## 2019-01-02 NOTE — TELEPHONE ENCOUNTER
Reason for call:  Patient reporting a symptom    Symptom or request: cold that has moved into the chest. Pt said she has had Pneu before and wants to make sure sjhe does not have it or Bronchitis.    Duration (how long have symptoms been present): 3 weeks    Have you been treated for this before? Yes    Additional comments: pt has a cough that has gotten worse in the last few days and is hard to sleep. Has been taking old medication that did help. Wants to make sure she does not need an Abx.    Phone Number patient can be reached at:  Home number on file [phone] (home)    Best Time:  any    Can we leave a detailed message on this number:  YES    Call taken on 1/2/2019 at 12:45 PM by Alicia Bashir

## 2019-01-02 NOTE — TELEPHONE ENCOUNTER
Called pt to get clarification, not available, keep ringing, no VMB. So, unable to LM. Will try later.    LOV with  was on 4/6/18.     Hal, RN  Triage Nurse

## 2019-01-03 ENCOUNTER — ANCILLARY PROCEDURE (OUTPATIENT)
Dept: GENERAL RADIOLOGY | Facility: CLINIC | Age: 54
End: 2019-01-03
Payer: COMMERCIAL

## 2019-01-03 ENCOUNTER — OFFICE VISIT (OUTPATIENT)
Dept: PEDIATRICS | Facility: CLINIC | Age: 54
End: 2019-01-03
Payer: COMMERCIAL

## 2019-01-03 VITALS
HEIGHT: 66 IN | BODY MASS INDEX: 33.09 KG/M2 | HEART RATE: 109 BPM | RESPIRATION RATE: 20 BRPM | DIASTOLIC BLOOD PRESSURE: 56 MMHG | SYSTOLIC BLOOD PRESSURE: 108 MMHG | TEMPERATURE: 98.2 F | WEIGHT: 205.9 LBS | OXYGEN SATURATION: 95 %

## 2019-01-03 DIAGNOSIS — R05.9 COUGH: ICD-10-CM

## 2019-01-03 DIAGNOSIS — J06.9 VIRAL URI WITH COUGH: Primary | ICD-10-CM

## 2019-01-03 DIAGNOSIS — I10 HYPERTENSION GOAL BP (BLOOD PRESSURE) < 140/90: ICD-10-CM

## 2019-01-03 PROCEDURE — 71046 X-RAY EXAM CHEST 2 VIEWS: CPT

## 2019-01-03 PROCEDURE — 99214 OFFICE O/P EST MOD 30 MIN: CPT | Performed by: FAMILY MEDICINE

## 2019-01-03 RX ORDER — BENZONATATE 200 MG/1
200 CAPSULE ORAL 3 TIMES DAILY PRN
Qty: 21 CAPSULE | Refills: 1 | Status: SHIPPED | OUTPATIENT
Start: 2019-01-03 | End: 2019-01-17

## 2019-01-03 ASSESSMENT — MIFFLIN-ST. JEOR: SCORE: 1554.84

## 2019-01-03 NOTE — PATIENT INSTRUCTIONS
Take tessalon every 8 hours as needed for cough    Take Dayquil or similar med with dextromethorphan to treat cough/congestion    Use albuterol every 2-3 hours as needed for wheezing or shortness of breath    Call if you develop worsening pain, fever (temp of 100.4)

## 2019-01-03 NOTE — PROGRESS NOTES
"Subjective:   Modesta Bravo is a 53 year old female who presents for   Chief Complaint   Patient presents with     URI     start 4 days sx chest congestion, productive cough with clear/body flem, coughing fits to point of vomiting chest tightness fatigue- not sleeping, body aches hx 1 episode of pneumonia 2 years ago  tx Dayquil and Corcidin not effective      Had slightly elevated temps weeks ago. The cough started a few days ago. Heavy cough keeping her up at night time. She has run out of tessalon which was helpful. Hospitalized 3 years ago for 3 days for pneumonia. Tightness in chest with breathing going into her back. No diarrhea but has some post tussive emesis. Is having congestion symptoms.  Decreased appetite.     Patient Active Problem List    Diagnosis Date Noted     Endometritis 07/26/2017     Priority: Medium     Cough 06/21/2016     Priority: Medium     Suspect cough variant asthma and UACS/post nasal drip. ICS, saline nasal rinse and flonase.       Anxiety 03/15/2016     Priority: Medium     Hypertension goal BP (blood pressure) < 140/90 01/29/2016     Priority: Medium     Hyperlipidemia      Priority: Medium     Hyperparathyroidism (H)      Priority: Medium       Current Outpatient Medications   Medication     atorvastatin (LIPITOR) 20 MG tablet     BABY ASPIRIN PO     benzonatate (TESSALON) 200 MG capsule     buPROPion (WELLBUTRIN XL) 300 MG 24 hr tablet     lisinopril-hydrochlorothiazide (PRINZIDE/ZESTORETIC) 20-25 MG per tablet     sertraline (ZOLOFT) 50 MG tablet     Vitamin D, Cholecalciferol, 1000 UNITS TABS     multivitamin, therapeutic with minerals (MULTI-VITAMIN) TABS tablet     No current facility-administered medications for this visit.      ROS:  As above per HPI    Objective:   /56 (BP Location: Right arm, Patient Position: Chair, Cuff Size: Adult Large)   Pulse 109   Temp 98.2  F (36.8  C) (Oral)   Resp 20   Ht 1.675 m (5' 5.95\")   Wt 93.4 kg (205 lb 14.4 oz)   SpO2 95%   " BMI 33.29 kg/m  , Body mass index is 33.29 kg/m .  Gen:  NAD, well-nourished, sitting in chair comfortably  HEENT: EOMI, sclera anicteric, Head normocephalic, ; nares patent; moist mucous membranes, left TM congested with fluid, normal right TM  Neck: trachea midline, no thyromegaly  CV:  Hemodynamically stable, RRR  Pulm:  no increased work of breathing , no crackles, wheezing diffusely but more prominent end expiratory in R lung fields  Extrem: no cyanosis, edema or clubbing  Skin: no obvious rashes or abnormalities  Psych: Euthymic, linear thoughts, normal rate of speech    2 view CXR: no effusion, pneuomothorax, consolidation per my reading    Assessment & Plan:   Modesta Bravo, 53 year old female who presents with:  Viral URI with cough  No obvious consolidation seen on x-ray imaging in addition to having no fevers we will treat this as a viral illness at this time.  She has good aeration bilaterally without any crackles on exam.  To help with her cough will prescribe her Tessalon and recommend that she use this with DayQuil or NyQuil.  Follow-up as needed. Will f/u on final Radiology report.   - XR Chest 2 Views  - benzonatate (TESSALON) 200 MG capsule  Dispense: 21 capsule; Refill: 1    Hypertension goal BP (blood pressure) < 140/90  Stable today with current regimen. Okay to use decongestants during this illness if needed      Stevie Ochoa MD   [facility]     Options for treatment and/or follow-up care were reviewed with the patient. Modesta Bravo and/or legal guardian was engaged and actively involved in the decision making process. Patient/guardian verbalized understanding of the options discussed and was satisfied with the final plan.

## 2019-01-07 ENCOUNTER — MYC MEDICAL ADVICE (OUTPATIENT)
Dept: URGENT CARE | Facility: URGENT CARE | Age: 54
End: 2019-01-07

## 2019-01-07 DIAGNOSIS — J18.9 PNEUMONIA DUE TO INFECTIOUS ORGANISM, UNSPECIFIED LATERALITY, UNSPECIFIED PART OF LUNG: Primary | ICD-10-CM

## 2019-01-07 NOTE — TELEPHONE ENCOUNTER
I see that pt was seen by SDP on 1/3/19. Will close the encounter.    Hal, RN  Triage Nurse

## 2019-01-09 RX ORDER — AZITHROMYCIN 250 MG/1
TABLET, FILM COATED ORAL
Qty: 6 TABLET | Refills: 0 | Status: SHIPPED | OUTPATIENT
Start: 2019-01-09 | End: 2019-04-02

## 2019-01-09 NOTE — TELEPHONE ENCOUNTER
Called and left message for patient, advised medication sent to the [facility] location. Amber Scanlon MA

## 2019-01-16 ENCOUNTER — MYC REFILL (OUTPATIENT)
Dept: PEDIATRICS | Facility: CLINIC | Age: 54
End: 2019-01-16

## 2019-01-16 DIAGNOSIS — J06.9 VIRAL URI WITH COUGH: ICD-10-CM

## 2019-01-16 RX ORDER — BENZONATATE 200 MG/1
200 CAPSULE ORAL 3 TIMES DAILY PRN
Qty: 21 CAPSULE | Refills: 1 | Status: CANCELLED | OUTPATIENT
Start: 2019-01-16

## 2019-01-17 RX ORDER — BENZONATATE 200 MG/1
200 CAPSULE ORAL 3 TIMES DAILY PRN
Qty: 21 CAPSULE | Refills: 1 | Status: SHIPPED | OUTPATIENT
Start: 2019-01-17 | End: 2019-04-02

## 2019-01-17 RX ORDER — BENZONATATE 200 MG/1
200 CAPSULE ORAL 3 TIMES DAILY PRN
Qty: 21 CAPSULE | Refills: 1 | OUTPATIENT
Start: 2019-01-17

## 2019-01-17 NOTE — TELEPHONE ENCOUNTER
Contacted patient who reports that she is about 80 % better.  States still having a lingering cough.  Will try an over the counter as her cough has improved.  Worried because she is going out of town.  Will call end of week if not improved to have request put in.    Sharon JOHN RN - Triage  [facility]

## 2019-03-07 NOTE — TELEPHONE ENCOUNTER
Prescription approved per [facility] Refill Protocol.

## 2019-03-25 ENCOUNTER — ALLIED HEALTH/NURSE VISIT (OUTPATIENT)
Dept: NURSING | Facility: CLINIC | Age: 54
End: 2019-03-25
Payer: COMMERCIAL

## 2019-03-25 DIAGNOSIS — Z23 NEED FOR VACCINATION: Primary | ICD-10-CM

## 2019-03-25 PROCEDURE — 90471 IMMUNIZATION ADMIN: CPT

## 2019-03-25 PROCEDURE — 99207 ZZC NO CHARGE NURSE ONLY: CPT

## 2019-03-25 PROCEDURE — 90715 TDAP VACCINE 7 YRS/> IM: CPT

## 2019-03-25 NOTE — PROGRESS NOTES
Modesta is a 52 year old  female who presents for annual exam.     Besides routine health maintenance, she has no other health concerns today .    HPI:  The patient's PCP is Toro Hugo MD.  Having light regular menses after [employer].   Noting urinary urgency but drinking lots of water for weight loss.  Mom recently . She is executor. Very emotional and stressful.  Still no libido on 150mg Wellbutrin. Wondering about Testosterone.  Wondering about continuing meds and management with me for her lipids and BP.  Doing Weight watchers and another program. Hasn't lost weight yet. Thinks wellbutrin does help with appetite.        GYNECOLOGIC HISTORY:    Patient's last menstrual period was 2018 (exact date).  Her current contraception method is: Ablation.  She  reports that she has never smoked. She has never used smokeless tobacco.    Patient is sexually active.  STD testing offered?  Declined  Last PHQ-9 score on record =   PHQ-9 SCORE 3/27/2018   Total Score 3     Last GAD7 score on record =   SARAI-7 SCORE 3/27/2018   Total Score 5     Alcohol Score = 3    HEALTH MAINTENANCE:  Cholesterol: 17   Total= 155, Triglycerides=142, HDL=45, LDL=82, JIQ=930  Cholesterol   Date Value Ref Range Status   2017 155 <200 mg/dL Final   2016 239 (H) <200 mg/dL Final     Comment:     Desirable:       <200 mg/dl   Last Mammo: 17, Result: normal, Next Mammo:  Pap: 17 NIL HPV Neg  Lab Results   Component Value Date    PAP NIL 2017   Colonoscopy:  Never, Result: not applicable, Next Colonoscopy: Will place referral   Dexa:  Never    Health maintenance updated:  yes    HISTORY:  Obstetric History       T2      L2     SAB0   TAB0   Ectopic0   Multiple0   Live Births0       # Outcome Date GA Lbr Bandar/2nd Weight Sex Delivery Anes PTL Lv   2 Term            1 Term                   Patient Active Problem List   Diagnosis     Hyperlipidemia     Hyperparathyroidism (H)      Hypertension goal BP (blood pressure) < 140/90     Anxiety     Cough     Endometritis     Past Surgical History:   Procedure Laterality Date     GYN SURGERY       NO HISTORY OF SURGERY        Social History   Substance Use Topics     Smoking status: Never Smoker     Smokeless tobacco: Never Used     Alcohol use 0.0 oz/week     0 Standard drinks or equivalent per week      Comment: daily 1 glass wine      Problem (# of Occurrences) Relation (Name,Age of Onset)    Breast Cancer (3) Sister (47): pt is BRCA neg, Maternal Grandmother, Sister    DIABETES (2) Mother, Father    Hyperlipidemia (2) Mother, Father    Hypertension (2) Mother, Father: chf 73            Current Outpatient Prescriptions   Medication Sig     lisinopril-hydrochlorothiazide (PRINZIDE/ZESTORETIC) 20-25 MG per tablet Take 1 tablet by mouth daily     atorvastatin (LIPITOR) 20 MG tablet TAKE 1 TABLET(20 MG) BY MOUTH DAILY     sertraline (ZOLOFT) 50 MG tablet TAKE 1 TABLET(50 MG) BY MOUTH DAILY     buPROPion (WELLBUTRIN XL) 300 MG 24 hr tablet TAKE 1 TABLET(150 MG) BY MOUTH EVERY MORNING     benzonatate (TESSALON) 100 MG capsule Take 2 capsules (200 mg) by mouth 3 times daily as needed     BABY ASPIRIN PO Take 81 mg by mouth daily      Vitamin D, Cholecalciferol, 1000 UNITS TABS Take 2,000 Units by mouth daily     [DISCONTINUED] lisinopril-hydrochlorothiazide (PRINZIDE/ZESTORETIC) 20-25 MG per tablet Take 1 tablet by mouth daily     [DISCONTINUED] atorvastatin (LIPITOR) 20 MG tablet TAKE 1 TABLET(20 MG) BY MOUTH DAILY     [DISCONTINUED] sertraline (ZOLOFT) 50 MG tablet TAKE 1 TABLET(50 MG) BY MOUTH DAILY     [DISCONTINUED] buPROPion (WELLBUTRIN XL) 150 MG 24 hr tablet TAKE 1 TABLET(150 MG) BY MOUTH EVERY MORNING     No current facility-administered medications for this visit.      No Known Allergies    Past medical, surgical, social and family histories were reviewed and updated in EPIC.    ROS:   12 point review of systems negative other than symptoms  "noted below.  Respiratory: Cough  Genitourinary: Hot Flashes, Night Sweats, Painful [place] and Urgency  Endocrine: Decreased Libido  Psychiatric: Anxiety and Difficulty Sleeping    EXAM:  /88  Ht 5' 6.75\" (1.695 m)  Wt 208 lb (94.3 kg)  LMP 03/26/2018 (Exact Date)  BMI 32.82 kg/m2   BMI: Body mass index is 32.82 kg/(m^2).    PHYSICAL EXAM:  Constitutional:  Appearance: Well nourished, well developed, alert, in no acute distress  Neck:  Lymph Nodes:  No lymphadenopathy present    Thyroid:  Gland size normal, nontender, no nodules or masses present  on palpation  Chest:  Respiratory Effort:  Breathing unlabored  Cardiovascular:    Heart: Auscultation:  Regular rate, normal rhythm, no murmurs present  Breasts: Inspection of Breasts:  No lymphadenopathy present., Palpation of Breasts and Axillae:  No masses present on palpation, no breast tenderness., Axillary Lymph Nodes:  No lymphadenopathy present. and No nodularity, asymmetry or nipple discharge bilaterally.  Gastrointestinal:   Abdominal Examination:  Abdomen nontender to palpation, tone normal without rigidity or guarding, no masses present, umbilicus without lesions   Liver and Spleen:  No hepatomegaly present, liver nontender to palpation    Hernias:  No hernias present  Lymphatic: Lymph Nodes:  No other lymphadenopathy present  Skin:  General Inspection:  No rashes present, no lesions present, no areas of  discoloration    Genitalia and Groin:  No rashes present, no lesions present, no areas of  discoloration, no masses present  Neurologic/Psychiatric:    Mental Status:  Oriented X3     Pelvic Exam:  External Genitalia:     Normal appearance for age, no discharge present, no tenderness present, no inflammatory lesions present, color normal  Vagina:     Normal vaginal vault without central or paravaginal defects, no discharge present, no inflammatory lesions present, no masses present  Bladder:     Nontender to palpation  Urethra:   Urethral Body: "  Urethra palpation normal, urethra structural support normal   Urethral Meatus:  No erythema or lesions present  Cervix:     Appearance healthy, no lesions present, nontender to palpation, no bleeding present  Uterus:     Uterus: firm, normal sized and nontender, anteverted in position.   Adnexa:     No adnexal tenderness present, no adnexal masses present  Perineum:     Perineum within normal limits, no evidence of trauma, no rashes or skin lesions present  Anus:     Anus within normal limits, no hemorrhoids present  Inguinal Lymph Nodes:     No lymphadenopathy present  Pubic Hair:     Normal pubic hair distribution for age  Genitalia and Groin:     No rashes present, no lesions present, no areas of discoloration, no masses present      COUNSELING:   Reviewed preventive health counseling, as reflected in patient instructions       Regular exercise    BMI: Body mass index is 32.82 kg/(m^2).  Weight management plan: Discussed weight loss    ASSESSMENT:  52 year old female with satisfactory annual exam.    ICD-10-CM    1. Encounter for gynecological examination without abnormal finding Z01.419 *MA Screening Digital Bilateral   2. Essential hypertension with goal blood pressure less than 140/90 I10 lisinopril-hydrochlorothiazide (PRINZIDE/ZESTORETIC) 20-25 MG per tablet   3. Hyperlipidemia LDL goal <100 E78.5 atorvastatin (LIPITOR) 20 MG tablet   4. Anxiety F41.9 sertraline (ZOLOFT) 50 MG tablet   5. Sexual dysfunction R37 buPROPion (WELLBUTRIN XL) 300 MG 24 hr tablet   6. Screening for cardiovascular condition Z13.6 Lipid panel reflex to direct LDL Fasting   7. Screening for metabolic disorder Z13.228 Comprehensive metabolic panel   8. Encounter for vitamin deficiency screening Z13.21 Vitamin D Deficiency   9. Screen for colon cancer Z12.11 GASTROENTEROLOGY ADULT REF PROCEDURE ONLY Dario Hale [phone]; No Provider Preference       PLAN:  Discussed longstanding decreased libido. Can be from Zoloft. Will  avoid testosterone IM at this point due to being emotional with mom's death. Increase wellbutrin to 300mg to see if improves mood and libido. If no affect on libido, consider Testosterone IM 200mg.    Discussed urinary urgency. Drinking carbonated mineral water all day. Drinking a lot at night as well. Discussed normal bladder function.    Refill Lipitor and Lisinopril. Labs today. If becomes abnormal will refer to primary care.    Discussed menopause and symptoms. No missed menses at this point    Sergei Painting MD   No pertinent past medical history <<----- Click to add NO pertinent Past Medical History

## 2019-03-29 NOTE — PROGRESS NOTES
Modesta is a 53 year old  female who presents for annual exam.     Besides routine health maintenance, she has no other health concerns today .    HPI:  The patient's PCP is  Toro Hugo MD.    Increased Wellbutrin to 300mg last year for possible decreased libido due to Zoloft. No noticeable effect. Thinks her mood is slightly better. Has been on Zoloft for many years and would like to see how she is off the med.  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 Wants to switch all care to [facility] in October      GYNECOLOGIC HISTORY:    No LMP recorded. Patient has had an ablation.  Her current contraception method is: vasectomy.  She  reports that  has never smoked. she has never used smokeless tobacco.    Patient is sexually active.  STD testing offered?  Declined  Last PHQ-9 score on record =   PHQ-9 SCORE 2019   PHQ-9 Total Score 2     Last GAD7 score on record =   SARAI-7 SCORE 2019   Total Score 6     Alcohol Score = 3    HEALTH MAINTENANCE:  Cholesterol: 10/17/2018   Total= 203, Triglycerides=96, HDL=68, PTH=351  Cholesterol   Date Value Ref Range Status   2018 205 (H) <200 mg/dL Final     Comment:     Desirable:       <200 mg/dl   2017 155 <200 mg/dL Final      Last Mammo: 3/27/2018, Result: normal, Next Mammo: today   Pap: 2017 NIL, HPV-  Lab Results   Component Value Date    PAP NIL 2017      Colonoscopy:  10/18/2018, Result: not applicable, Next Colonoscopy: 10 years.  Dexa: 10/17/2018    Health maintenance updated:  yes    HISTORY:  Obstetric History       T2      L2     SAB0   TAB0   Ectopic0   Multiple0   Live Births0       # Outcome Date GA Lbr Bandar/2nd Weight Sex Delivery Anes PTL Lv   2 Term            1 Term                   Patient Active Problem List   Diagnosis     Hyperlipidemia     Hyperparathyroidism (H)     Hypertension goal BP (blood pressure) < 140/90     Anxiety     Cough     Endometritis     Past Surgical History:   Procedure Laterality Date     GYN  "SURGERY       NO HISTORY OF SURGERY        Social History     Tobacco Use     Smoking status: Never Smoker     Smokeless tobacco: Never Used   Substance Use Topics     Alcohol use: Yes     Alcohol/week: 0.0 oz     Comment: daily 1 glass wine      Problem (# of Occurrences) Relation (Name,Age of Onset)    Breast Cancer (3) Sister (47): pt is BRCA neg, Maternal Grandmother, Sister    Diabetes (2) Mother, Father    Hyperlipidemia (2) Mother, Father    Hypertension (2) Mother, Father: chf 73            Current Outpatient Medications   Medication Sig     atorvastatin (LIPITOR) 20 MG tablet TAKE 1 TABLET(20 MG) BY MOUTH DAILY     BABY ASPIRIN PO Take 81 mg by mouth daily      buPROPion (WELLBUTRIN XL) 300 MG 24 hr tablet TAKE 1 TABLET BY MOUTH EVERY MORNING.     lisinopril-hydrochlorothiazide (PRINZIDE/ZESTORETIC) 20-25 MG per tablet Take 1 tablet by mouth daily     sertraline (ZOLOFT) 50 MG tablet TAKE 1 TABLET(50 MG) BY MOUTH DAILY     Vitamin D, Cholecalciferol, 1000 UNITS TABS Take 3,000 Units by mouth daily      No current facility-administered medications for this visit.      Allergies   Allergen Reactions     Shellfish Allergy        Past medical, surgical, social and family histories were reviewed and updated in EPIC.    ROS:   12 point review of systems negative other than symptoms noted below.    EXAM:  /76   Pulse 78   Ht 1.702 m (5' 7\")   Wt 92.3 kg (203 lb 6.4 oz)   Breastfeeding? No   BMI 31.86 kg/m     BMI: Body mass index is 31.86 kg/m .    PHYSICAL EXAM:  Constitutional:  Appearance: Well nourished, well developed, alert, in no acute distress  Neck:  Lymph Nodes:  No lymphadenopathy present    Thyroid:  Gland size normal, nontender, no nodules or masses present  on palpation  Chest:  Respiratory Effort:  Breathing unlabored  Cardiovascular:    Heart: Auscultation:  Regular rate, normal rhythm, no murmurs present  Breasts: Inspection of Breasts:  No lymphadenopathy present., Palpation of Breasts " and Axillae:  No masses present on palpation, no breast tenderness., Axillary Lymph Nodes:  No lymphadenopathy present. and No nodularity, asymmetry or nipple discharge bilaterally.  Gastrointestinal:   Abdominal Examination:  Abdomen nontender to palpation, tone normal without rigidity or guarding, no masses present, umbilicus without lesions   Liver and Spleen:  No hepatomegaly present, liver nontender to palpation    Hernias:  No hernias present  Lymphatic: Lymph Nodes:  No other lymphadenopathy present  Skin:  General Inspection:  No rashes present, no lesions present, no areas of  discoloration    Genitalia and Groin:  No rashes present, no lesions present, no areas of  discoloration, no masses present  Neurologic/Psychiatric:    Mental Status:  Oriented X3     Pelvic Exam:  External Genitalia:     Normal appearance for age, no discharge present, no tenderness present, no inflammatory lesions present, color normal  Vagina:     Normal vaginal vault without central or paravaginal defects, no discharge present, no inflammatory lesions present, no masses present  Bladder:     Nontender to palpation  Urethra:   Urethral Body:  Urethra palpation normal, urethra structural support normal   Urethral Meatus:  No erythema or lesions present  Cervix:     Appearance healthy, no lesions present, nontender to palpation, no bleeding present  Uterus:     Uterus: firm, normal sized and nontender, anteverted in position.   Adnexa:     No adnexal tenderness present, no adnexal masses present  Perineum:     Perineum within normal limits, no evidence of trauma, no rashes or skin lesions present  Anus:     Anus within normal limits, no hemorrhoids present  Inguinal Lymph Nodes:     No lymphadenopathy present  Pubic Hair:     Normal pubic hair distribution for age  Genitalia and Groin:     No rashes present, no lesions present, no areas of discoloration, no masses present      COUNSELING:   Reviewed preventive health counseling, as  reflected in patient instructions       Regular exercise    BMI: Body mass index is 31.86 kg/m .  Weight management plan: Encouraged weight loss    ASSESSMENT:  53 year old female with satisfactory annual exam.    ICD-10-CM    1. Encounter for gynecological examination without abnormal finding Z01.419    2. Screen for colon cancer Z12.11    3. Hyperlipidemia LDL goal <100 E78.5 atorvastatin (LIPITOR) 20 MG tablet   4. Sexual dysfunction R37 buPROPion (WELLBUTRIN XL) 300 MG 24 hr tablet   5. Essential hypertension with goal blood pressure less than 140/90 I10 lisinopril-hydrochlorothiazide (PRINZIDE/ZESTORETIC) 20-25 MG tablet   6. Anxiety F41.9 sertraline (ZOLOFT) 50 MG tablet       PLAN:  Discussed process for weaning Zoloft. Stay on Wellbutrin.   Discussed decreased libido. Can do Testosterone 150-200mg IM once to see if has response. If not, has other reasons for decrease. If responds start 2% cream at 5 weeks.   Will wean off zoloft first and see if there is any difference.   All meds refilled. [facility] to take over in October.      Sergei Painting MD

## 2019-04-02 ENCOUNTER — OFFICE VISIT (OUTPATIENT)
Dept: OBGYN | Facility: CLINIC | Age: 54
End: 2019-04-02
Payer: COMMERCIAL

## 2019-04-02 ENCOUNTER — MYC MEDICAL ADVICE (OUTPATIENT)
Dept: OBGYN | Facility: CLINIC | Age: 54
End: 2019-04-02

## 2019-04-02 VITALS
DIASTOLIC BLOOD PRESSURE: 76 MMHG | WEIGHT: 203.4 LBS | SYSTOLIC BLOOD PRESSURE: 122 MMHG | BODY MASS INDEX: 31.92 KG/M2 | HEART RATE: 78 BPM | HEIGHT: 67 IN

## 2019-04-02 DIAGNOSIS — R37 SEXUAL DYSFUNCTION: ICD-10-CM

## 2019-04-02 DIAGNOSIS — I10 ESSENTIAL HYPERTENSION WITH GOAL BLOOD PRESSURE LESS THAN 140/90: ICD-10-CM

## 2019-04-02 DIAGNOSIS — F41.9 ANXIETY: ICD-10-CM

## 2019-04-02 DIAGNOSIS — E78.5 HYPERLIPIDEMIA LDL GOAL <100: ICD-10-CM

## 2019-04-02 DIAGNOSIS — Z01.419 ENCOUNTER FOR GYNECOLOGICAL EXAMINATION WITHOUT ABNORMAL FINDING: Primary | ICD-10-CM

## 2019-04-02 DIAGNOSIS — Z12.11 SCREEN FOR COLON CANCER: ICD-10-CM

## 2019-04-02 PROCEDURE — 99213 OFFICE O/P EST LOW 20 MIN: CPT | Mod: 25 | Performed by: OBSTETRICS & GYNECOLOGY

## 2019-04-02 PROCEDURE — 99396 PREV VISIT EST AGE 40-64: CPT | Performed by: OBSTETRICS & GYNECOLOGY

## 2019-04-02 RX ORDER — ATORVASTATIN CALCIUM 20 MG/1
TABLET, FILM COATED ORAL
Qty: 90 TABLET | Refills: 3 | Status: SHIPPED | OUTPATIENT
Start: 2019-04-02 | End: 2020-01-28

## 2019-04-02 RX ORDER — LISINOPRIL AND HYDROCHLOROTHIAZIDE 20; 25 MG/1; MG/1
1 TABLET ORAL DAILY
Qty: 90 TABLET | Refills: 3 | Status: SHIPPED | OUTPATIENT
Start: 2019-04-02 | End: 2020-01-28

## 2019-04-02 RX ORDER — BUPROPION HYDROCHLORIDE 300 MG/1
300 TABLET ORAL EVERY MORNING
Qty: 90 TABLET | Refills: 3 | Status: SHIPPED | OUTPATIENT
Start: 2019-04-02 | End: 2019-10-23

## 2019-04-02 ASSESSMENT — MIFFLIN-ST. JEOR: SCORE: 1560.25

## 2019-04-02 ASSESSMENT — PATIENT HEALTH QUESTIONNAIRE - PHQ9
5. POOR APPETITE OR OVEREATING: SEVERAL DAYS
SUM OF ALL RESPONSES TO PHQ QUESTIONS 1-9: 2

## 2019-04-02 ASSESSMENT — ANXIETY QUESTIONNAIRES
7. FEELING AFRAID AS IF SOMETHING AWFUL MIGHT HAPPEN: SEVERAL DAYS
2. NOT BEING ABLE TO STOP OR CONTROL WORRYING: SEVERAL DAYS
IF YOU CHECKED OFF ANY PROBLEMS ON THIS QUESTIONNAIRE, HOW DIFFICULT HAVE THESE PROBLEMS MADE IT FOR YOU TO DO YOUR WORK, TAKE CARE OF THINGS AT HOME, OR GET ALONG WITH OTHER PEOPLE: SOMEWHAT DIFFICULT
3. WORRYING TOO MUCH ABOUT DIFFERENT THINGS: SEVERAL DAYS
6. BECOMING EASILY ANNOYED OR IRRITABLE: SEVERAL DAYS
5. BEING SO RESTLESS THAT IT IS HARD TO SIT STILL: NOT AT ALL
GAD7 TOTAL SCORE: 6
1. FEELING NERVOUS, ANXIOUS, OR ON EDGE: SEVERAL DAYS

## 2019-04-03 ASSESSMENT — ANXIETY QUESTIONNAIRES: GAD7 TOTAL SCORE: 6

## 2019-04-03 NOTE — TELEPHONE ENCOUNTER
Take zoloft every other day for a week then every 3rd day for a week and then stop. Might not be as bad with zoloft and with wellbutrin

## 2019-04-16 ENCOUNTER — TELEPHONE (OUTPATIENT)
Dept: PEDIATRICS | Facility: CLINIC | Age: 54
End: 2019-04-16

## 2019-04-16 NOTE — TELEPHONE ENCOUNTER
Panel Management Review      Composite cancer screening  Chart review shows that this patient is due/due soon for the following Colonoscopy  Summary:    Patient is due/failing the following:   COLONOSCOPY    Action needed:   Patient needs office visit for COLONOSCOPY.    Type of outreach:    Sent letter.    Questions for provider review:    None                                                                                                                                    Husam Love CMA

## 2019-04-16 NOTE — LETTER
April 16, 2019      Modesta Bravo  [address] [place] [address]        Dear Modesta,       We care about your health and have reviewed your health plan including your medical conditions, medications, and lab results.  Based on this review, it is recommended that you follow up regarding the following health topic(s):  -Colon Cancer Screening    We recommend you take the following action(s):  -schedule a COLONOSCOPY to look for colon cancer (due every 10 years or 5 years in higher risk situations.)  Colonoscopies can prevent 90-95% of colon cancer deaths.  Problem lesions can be removed before they ever become cancer.  If you do not wish to do a colonoscopy or cannot afford to do one at this time, there is another option called a Fecal Immunochemical Occult Blood Test (FIT) a take home stool sample kit.  It does not replace the colonoscopy for colorectal cancer screening, but it can detect hidden bleeding in the lower colon.  It does need to be repeated every year and if a positive result is obtained, you would be referred for a colonoscopy.  If you have completed either one of these tests at another facility, please have the records sent to our clinic for our records.     Please call us at the [facility] - [phone] (or use [facility]MOBI) to address the above recommendations.     Thank you for trusting [facility] and we appreciate the opportunity to serve you.  We look forward to supporting your healthcare needs in the future.    Healthy Regards,    Your Health Care Team  [facility]

## 2019-05-30 DIAGNOSIS — F41.9 ANXIETY: ICD-10-CM

## 2019-05-31 NOTE — TELEPHONE ENCOUNTER
"Requested Prescriptions   Pending Prescriptions Disp Refills     sertraline (ZOLOFT) 50 MG tablet [Pharmacy Med Name: SERTRALINE 50MG TABLETS] 90 tablet 0     Sig: TAKE 1 TABLET(50 MG) BY MOUTH DAILY       SSRIs Protocol Passed - 5/30/2019  6:36 PM        Passed - Recent (12 mo) or future (30 days) visit within the authorizing provider's specialty     Patient had office visit in the last 12 months or has a visit in the next 30 days with authorizing provider or within the authorizing provider's specialty.  See \"Patient Info\" tab in inbasket, or \"Choose Columns\" in Meds & Orders section of the refill encounter.              Passed - Medication is active on med list        Passed - Patient is age 18 or older        Passed - No active pregnancy on record        Passed - No positive pregnancy test in last 12 months        Pt has refills available  Yaneth Chung RN on 5/31/2019 at 7:38 AM    "

## 2019-09-29 ENCOUNTER — HEALTH MAINTENANCE LETTER (OUTPATIENT)
Age: 54
End: 2019-09-29

## 2019-10-22 ENCOUNTER — TELEPHONE (OUTPATIENT)
Dept: OBGYN | Facility: CLINIC | Age: 54
End: 2019-10-22

## 2019-10-22 NOTE — TELEPHONE ENCOUNTER
Pt asking for a call back, wanted to speak with a nurse in regards to the possibility of discontinuing a med she is taking. Please advise.

## 2019-10-22 NOTE — TELEPHONE ENCOUNTER
Returned pt call  Wellbutrin 300mg has been bothering her stomach and insomnia  Wants to go off the wellbutrin instead  Asking if needs to wean off this or can go cold turkey.     Still on Zoloft 50mg (discussed at last visit weaning off and decided against it)    Routing to Dr Borden to advise, pt knows he returns to office tomorrow and can wait. She thinks he said cold turkey is ok, but consulting another provider they said to wean. So would like Mert's input.    Reena Castro RN on 10/22/2019 at 2:11 PM

## 2019-10-30 ENCOUNTER — ALLIED HEALTH/NURSE VISIT (OUTPATIENT)
Dept: NURSING | Facility: CLINIC | Age: 54
End: 2019-10-30
Payer: COMMERCIAL

## 2019-10-30 DIAGNOSIS — Z23 NEED FOR PROPHYLACTIC VACCINATION AND INOCULATION AGAINST INFLUENZA: Primary | ICD-10-CM

## 2019-10-30 PROCEDURE — 90471 IMMUNIZATION ADMIN: CPT

## 2019-10-30 PROCEDURE — 90682 RIV4 VACC RECOMBINANT DNA IM: CPT

## 2019-10-30 NOTE — PROGRESS NOTES
Injectable Influenza Immunization Documentation    1.  Has the patient received the information for the injectable influenza vaccine? YES     2. Is the patient 6 months of age or older? YES     3. Does the patient have any of the following contraindications?         Severe allergy to eggs?  No     Severe allergic reaction to previous influenza vaccines?  No   Severe allergy to latex?  No       History of Guillain-Novice syndrome?  No     Currently have a temperature greater than 100.4F?  No    Vaccination given by Gayla Palmer MA 11:06 AM 10/30/2019

## 2019-12-27 ENCOUNTER — TRANSFERRED RECORDS (OUTPATIENT)
Dept: MULTI SPECIALTY CLINIC | Facility: CLINIC | Age: 54
End: 2019-12-27

## 2019-12-27 LAB
ALT SERPL-CCNC: 98 U/L (ref 7–45)
CHOLEST SERPL-MCNC: 221 MG/DL
CREAT SERPL-MCNC: 0.85 MG/DL (ref 0.59–1.04)
GFR SERPL CREATININE-BSD FRML MDRD: 78 ML/MIN/BSA
GLUCOSE SERPL-MCNC: 104 MG/DL (ref 70–100)
HDLC SERPL-MCNC: 65 MG/DL
HIV 1&2 EXT: NORMAL
LDLC SERPL CALC-MCNC: 124 MG/DL
NONHDLC SERPL-MCNC: 156 MG/DL
POTASSIUM SERPL-SCNC: 4.9 MMOL/L (ref 3.6–5.2)
TRIGL SERPL-MCNC: 158 MG/DL

## 2020-01-15 ENCOUNTER — TELEPHONE (OUTPATIENT)
Dept: PEDIATRICS | Facility: CLINIC | Age: 55
End: 2020-01-15

## 2020-01-15 NOTE — LETTER
[facility]  [address]  [place] [address]  [phone]      January 21, 2020    Modesta Bravo                                                                                                                                                       [address] [place] [address]              Dear Modesta,  This letter is to remind you that you are due for a colonoscopy.   Please contact our clinic at [phone] for help in scheduling this important procedure.     Colon cancer is now the second leading cause of cancer-related deaths in the [place] for both men and women and there are over 130,000 new cases and 50,000 deaths per year from colon cancer.  Colonoscopies can prevent 90-95% of these deaths.  Problem lesions can be removed before they ever become cancer.  This test is not only looking for cancer, but also getting rid of precancerious lesions.     If you are under/uninsured, we recommend you contact the [employer] program. [employer] is a free colorectal cancer screening program that provides colonoscopies for eligible under/uninsured [place] men and women. If you are interested in receiving a free colonoscopy, please call [employer] at [phone] to see if you're eligible.     If you do not wish to do a colonoscopy or cannot afford to do one, at this time, there is another option. It is called a FIT test or Fecal Immunochemical Occult Blood Test (take home stool sample kit).  It does not replace the colonoscopy for colorectal cancer screening, but it can detect hidden bleeding in the lower colon.  It does need to be repeated every year and if a positive result is obtained, you would be referred for a colonoscopy.     If you have completed either one of these tests at another facility, please call with the details of when and where the tests were done and if they were normal or not. Or have the records sent to our clinic so that we can best coordinate your  care.     Thank you.     Fatmata DOAN LPN

## 2020-01-15 NOTE — TELEPHONE ENCOUNTER
Type of outreach:  Sent TrackVia message.  Health Maintenance Due   Topic Date Due     HEPATITIS C SCREENING  1965     ANNUAL REVIEW OF HM ORDERS  1965     ASTHMA ACTION PLAN  1965     ASTHMA CONTROL TEST  1965     COLONOSCOPY  11/09/1975     HIV SCREENING  11/09/1980     ZOSTER IMMUNIZATION (1 of 2) 11/09/2015     PHQ-2  01/01/2020     Fatmata Caballero LPN

## 2020-01-21 NOTE — TELEPHONE ENCOUNTER
Type of outreach:  Sent letter.  Health Maintenance Due   Topic Date Due     HEPATITIS C SCREENING  1965     ANNUAL REVIEW OF HM ORDERS  1965     ASTHMA ACTION PLAN  1965     ASTHMA CONTROL TEST  1965     COLONOSCOPY  11/09/1975     HIV SCREENING  11/09/1980     ZOSTER IMMUNIZATION (1 of 2) 11/09/2015     PHQ-2  01/01/2020     Fatmata Caballero LPN

## 2020-01-28 ENCOUNTER — OFFICE VISIT (OUTPATIENT)
Dept: PEDIATRICS | Facility: CLINIC | Age: 55
End: 2020-01-28
Payer: COMMERCIAL

## 2020-01-28 VITALS
DIASTOLIC BLOOD PRESSURE: 72 MMHG | SYSTOLIC BLOOD PRESSURE: 122 MMHG | OXYGEN SATURATION: 97 % | TEMPERATURE: 98.3 F | HEART RATE: 95 BPM | WEIGHT: 203 LBS | HEIGHT: 67 IN | BODY MASS INDEX: 31.86 KG/M2

## 2020-01-28 DIAGNOSIS — I10 ESSENTIAL HYPERTENSION WITH GOAL BLOOD PRESSURE LESS THAN 140/90: ICD-10-CM

## 2020-01-28 DIAGNOSIS — F41.9 ANXIETY: ICD-10-CM

## 2020-01-28 DIAGNOSIS — G47.33 OSA (OBSTRUCTIVE SLEEP APNEA): Primary | ICD-10-CM

## 2020-01-28 DIAGNOSIS — E78.5 HYPERLIPIDEMIA LDL GOAL <100: ICD-10-CM

## 2020-01-28 DIAGNOSIS — E66.9 OBESITY, UNSPECIFIED CLASSIFICATION, UNSPECIFIED OBESITY TYPE, UNSPECIFIED WHETHER SERIOUS COMORBIDITY PRESENT: ICD-10-CM

## 2020-01-28 PROCEDURE — 99214 OFFICE O/P EST MOD 30 MIN: CPT | Performed by: INTERNAL MEDICINE

## 2020-01-28 RX ORDER — ATORVASTATIN CALCIUM 20 MG/1
TABLET, FILM COATED ORAL
Qty: 90 TABLET | Refills: 3 | Status: SHIPPED | OUTPATIENT
Start: 2020-01-28 | End: 2021-04-19

## 2020-01-28 RX ORDER — FAMOTIDINE 20 MG/1
20 TABLET, FILM COATED ORAL 2 TIMES DAILY
COMMUNITY
Start: 2020-01-28 | End: 2022-06-09

## 2020-01-28 RX ORDER — LISINOPRIL AND HYDROCHLOROTHIAZIDE 20; 25 MG/1; MG/1
1 TABLET ORAL DAILY
Qty: 90 TABLET | Refills: 3 | Status: SHIPPED | OUTPATIENT
Start: 2020-01-28 | End: 2024-01-19 | Stop reason: SINTOL

## 2020-01-28 ASSESSMENT — ANXIETY QUESTIONNAIRES
7. FEELING AFRAID AS IF SOMETHING AWFUL MIGHT HAPPEN: SEVERAL DAYS
5. BEING SO RESTLESS THAT IT IS HARD TO SIT STILL: SEVERAL DAYS
GAD7 TOTAL SCORE: 7
2. NOT BEING ABLE TO STOP OR CONTROL WORRYING: SEVERAL DAYS
IF YOU CHECKED OFF ANY PROBLEMS ON THIS QUESTIONNAIRE, HOW DIFFICULT HAVE THESE PROBLEMS MADE IT FOR YOU TO DO YOUR WORK, TAKE CARE OF THINGS AT HOME, OR GET ALONG WITH OTHER PEOPLE: SOMEWHAT DIFFICULT
6. BECOMING EASILY ANNOYED OR IRRITABLE: SEVERAL DAYS
3. WORRYING TOO MUCH ABOUT DIFFERENT THINGS: SEVERAL DAYS
1. FEELING NERVOUS, ANXIOUS, OR ON EDGE: SEVERAL DAYS

## 2020-01-28 ASSESSMENT — PATIENT HEALTH QUESTIONNAIRE - PHQ9
5. POOR APPETITE OR OVEREATING: SEVERAL DAYS
SUM OF ALL RESPONSES TO PHQ QUESTIONS 1-9: 5

## 2020-01-28 ASSESSMENT — MIFFLIN-ST. JEOR: SCORE: 1553.43

## 2020-01-28 NOTE — PROGRESS NOTES
Subjective     Modesta Bravo is a 54 year old female who presents to clinic today for the following health issues:    HPI     Patient stated she had a mole removed at [facility], thinks its infected not healing right, lumpy, red and painful     Had stitches placed for mole biopsy 12/27; thinks may be infected.    Was recommended to do a sleep apnea test.    Also was hoping for possible referral to help with weight loss.     Would like refill of sertraine,     Drinks 2 glasses of wine per night.  Not exercising over the holiday.  Trying to eat baked chips.  Likes crackers and cheese.        Patient Active Problem List   Diagnosis     Hyperlipidemia     Hyperparathyroidism (H)     Hypertension goal BP (blood pressure) < 140/90     Anxiety     Cough     Endometritis     Past Surgical History:   Procedure Laterality Date     GYN SURGERY       NO HISTORY OF SURGERY         Social History     Tobacco Use     Smoking status: Never Smoker     Smokeless tobacco: Never Used   Substance Use Topics     Alcohol use: Yes     Alcohol/week: 0.0 standard drinks     Comment: daily 1 glass wine     Family History   Problem Relation Age of Onset     Hyperlipidemia Mother      Hypertension Mother      Diabetes Mother      Hyperlipidemia Father      Diabetes Father      Hypertension Father         chf 73     Breast Cancer Sister 47        pt is BRCA neg     Breast Cancer Maternal Grandmother      Breast Cancer Sister          Current Outpatient Medications   Medication Sig Dispense Refill     atorvastatin (LIPITOR) 20 MG tablet TAKE 1 TABLET(20 MG) BY MOUTH DAILY 90 tablet 3     BABY ASPIRIN PO Take 81 mg by mouth daily        famotidine (PEPCID) 20 MG tablet Take 1 tablet (20 mg) by mouth 2 times daily       lisinopril-hydrochlorothiazide (PRINZIDE/ZESTORETIC) 20-25 MG tablet Take 1 tablet by mouth daily 90 tablet 3     sertraline (ZOLOFT) 50 MG tablet TAKE 1 TABLET(50 MG) BY MOUTH DAILY 90 tablet 3     Allergies   Allergen Reactions      Shellfish Allergy      BP Readings from Last 3 Encounters:   01/28/20 122/72   04/02/19 122/76   01/03/19 108/56    Wt Readings from Last 3 Encounters:   01/28/20 92.1 kg (203 lb)   04/02/19 92.3 kg (203 lb 6.4 oz)   01/03/19 93.4 kg (205 lb 14.4 oz)                      Reviewed and updated as needed this visit by Provider         Review of Systems   ROS COMP: CONSTITUTIONAL: NEGATIVE for fever, chills, change in weight  INTEGUMENTARY/SKIN: NEGATIVE for worrisome rashes, moles or lesions  EYES: NEGATIVE for vision changes or irritation  ENT/MOUTH: NEGATIVE for ear, mouth and throat problems  RESP: NEGATIVE for significant cough or SOB  BREAST: NEGATIVE for masses, tenderness or discharge  CV: NEGATIVE for chest pain, palpitations or peripheral edema  GI: NEGATIVE for nausea, abdominal pain, heartburn, or change in bowel habits  : NEGATIVE for frequency, dysuria, or hematuria  MUSCULOSKELETAL: NEGATIVE for significant arthralgias or myalgia  NEURO: NEGATIVE for weakness, dizziness or paresthesias  ENDOCRINE: NEGATIVE for temperature intolerance, skin/hair changes  HEME: NEGATIVE for bleeding problems  PSYCHIATRIC: NEGATIVE for changes in mood or affect      Objective    There were no vitals taken for this visit.  There is no height or weight on file to calculate BMI.  Physical Exam   GENERAL: healthy, alert and no distress  EYES: Eyes grossly normal to inspection, PERRL and conjunctivae and sclerae normal  HENT: ear canals and TM's normal, nose and mouth without ulcers or lesions  NECK: no adenopathy, no asymmetry, masses, or scars and thyroid normal to palpation  RESP: lungs clear to auscultation - no rales, rhonchi or wheezes  BREAST: normal without masses, tenderness or nipple discharge and no palpable axillary masses or adenopathy  CV: regular rate and rhythm, normal S1 S2, no S3 or S4, no murmur, click or rub, no peripheral edema and peripheral pulses strong  ABDOMEN: soft, nontender, no  "hepatosplenomegaly, no masses and bowel sounds normal  MS: no gross musculoskeletal defects noted, no edema  SKIN: no suspicious lesions or rashes  NEURO: Normal strength and tone, mentation intact and speech normal  PSYCH: mentation appears normal, affect normal/bright    Diagnostic Test Results:  Labs reviewed in Epic        Assessment & Plan     1. Hyperlipidemia LDL goal <100  Continue with statin.   - atorvastatin (LIPITOR) 20 MG tablet; TAKE 1 TABLET(20 MG) BY MOUTH DAILY  Dispense: 90 tablet; Refill: 3    2. Essential hypertension with goal blood pressure less than 140/90  Doing well with respect to blood pressure.   - lisinopril-hydrochlorothiazide (PRINZIDE/ZESTORETIC) 20-25 MG tablet; Take 1 tablet by mouth daily  Dispense: 90 tablet; Refill: 3    3. Anxiety  Refilled; SARAI stable.   - sertraline (ZOLOFT) 50 MG tablet; TAKE 1 TABLET(50 MG) BY MOUTH DAILY  Dispense: 90 tablet; Refill: 3    4. ROXANN (obstructive sleep apnea)  Referred at patient request for possible home sleep study..   - SLEEP EVALUATION & MANAGEMENT REFERRAL - [facility]  [phone] (Age 18 and up); Future    5.  Obesity:   Discussed Weight Watchers and beginning class for impaired fasting glucose.      BMI:   Estimated body mass index is 31.79 kg/m  as calculated from the following:    Height as of this encounter: 1.702 m (5' 7\").    Weight as of this encounter: 92.1 kg (203 lb).   Weight management plan: Patient was referred to their PCP to discuss a diet and exercise plan.        Patient Instructions   Call for sleep apnea evaluation.    I would also strongly recommend that you take a quick, 2-hour class on \"Pre-Diabetes\" and how to eat healthy to prevent further weight gain and higher sugars.  You can simply call  and ask for the \"Pre-Diabetes Class\" to do this.  It is VERY helpful.    We could consider a trial of metformin for lowering blood sugars.      Goal:  190 in 4-6 months. Limiting " processed carbs (crackers, chips, potatoes, rice, and pasta and bread.)  If not improving, we can then consider a referral to a weight management center at [facility].    Refilled meds for 1 year.     Walk into our pharmacy after 2/27 for second shingles vaccine.     Moses Buitrago MD  Internal Medicine and Pediatrics          No follow-ups on file.    Moses Buitrago MD  [facility]

## 2020-01-28 NOTE — PATIENT INSTRUCTIONS
"Call for sleep apnea evaluation.    I would also strongly recommend that you take a quick, 2-hour class on \"Pre-Diabetes\" and how to eat healthy to prevent further weight gain and higher sugars.  You can simply call  and ask for the \"Pre-Diabetes Class\" to do this.  It is VERY helpful.    We could consider a trial of metformin for lowering blood sugars.      Goal:  190 in 4-6 months. Limiting processed carbs (crackers, chips, potatoes, rice, and pasta and bread.)  If not improving, we can then consider a referral to a weight management center at [facility].    Refilled meds for 1 year.     Walk into our pharmacy after 2/27 for second shingles vaccine.     Moses Buitrago MD  Internal Medicine and Pediatrics      "

## 2020-01-29 ASSESSMENT — ANXIETY QUESTIONNAIRES: GAD7 TOTAL SCORE: 7

## 2020-04-07 ENCOUNTER — E-VISIT (OUTPATIENT)
Dept: PEDIATRICS | Facility: CLINIC | Age: 55
End: 2020-04-07
Payer: COMMERCIAL

## 2020-04-07 DIAGNOSIS — Z53.9 ERRONEOUS ENCOUNTER--DISREGARD: Primary | ICD-10-CM

## 2020-09-24 ENCOUNTER — TRANSFERRED RECORDS (OUTPATIENT)
Dept: HEALTH INFORMATION MANAGEMENT | Facility: CLINIC | Age: 55
End: 2020-09-24

## 2021-01-14 ENCOUNTER — HEALTH MAINTENANCE LETTER (OUTPATIENT)
Age: 56
End: 2021-01-14

## 2021-03-15 DIAGNOSIS — F41.9 ANXIETY: ICD-10-CM

## 2021-03-16 NOTE — TELEPHONE ENCOUNTER
Routing refill request to provider for review/approval because:  Elevated SARAI and not current    SARAI-7 SCORE 3/27/2018 4/2/2019 1/28/2020   Total Score 5 6 7       Rito LOPEZ, RN, BSN

## 2021-04-17 DIAGNOSIS — E78.5 HYPERLIPIDEMIA LDL GOAL <100: ICD-10-CM

## 2021-04-17 NOTE — LETTER
[facility]  [address]  [address]  [place] [address]  Phone: [phone]  Fax: [phone]        April 23, 2021      Modesta AYALA Lawrence                                                                                                                                [address]  [place] [address]            Dear Ms. Bravo,    We are concerned about your health care. We recently provided you with a medication refill.  Many medications require routine follow-up with your Doctor.      At this time we ask that: You schedule an appointment for your annual physical with fasting labs and a medication recheck.     Your prescription of atorvastatin (LIPITOR) 20 MG tablet Has been refilled for 3 months so you may have time for the above noted follow-up. Please contact us at [phone] to schedule your visit.       Thank you,      Moses Buitrago MD / [facility]

## 2021-04-19 RX ORDER — ATORVASTATIN CALCIUM 20 MG/1
TABLET, FILM COATED ORAL
Qty: 90 TABLET | Refills: 0 | Status: SHIPPED | OUTPATIENT
Start: 2021-04-19 | End: 2023-08-09

## 2021-04-19 NOTE — TELEPHONE ENCOUNTER
Medication is being filled for 1 time refill only due to:  Patient needs to be seen because it has been more than one year since last visit.     Routing to MA/TC pool. The Pt is due for a visit with PCP. Please call and help them schedule.    RN will issue a 90 day supply. No further refills until the Pt is seen.       Ramya Costello, RN   [facility] -- Triage Nurse

## 2021-04-20 NOTE — TELEPHONE ENCOUNTER
Call placed to patient. Left voicemail for patient to call us back to schedule. Due for annual physical.

## 2021-04-28 ENCOUNTER — MYC MEDICAL ADVICE (OUTPATIENT)
Dept: PEDIATRICS | Facility: CLINIC | Age: 56
End: 2021-04-28

## 2021-06-18 DIAGNOSIS — F41.9 ANXIETY: ICD-10-CM

## 2021-06-18 NOTE — TELEPHONE ENCOUNTER
Routing refill request to provider for review/approval because:  Patient needs to be seen because it has been more than 1 year since last office visit.    Heide Olmos, ROHITN, RN  [facility]

## 2021-09-21 DIAGNOSIS — F41.9 ANXIETY: ICD-10-CM

## 2021-09-23 NOTE — TELEPHONE ENCOUNTER
Routing refill request to provider for review/approval because:  Rowan given x1 and patient did not follow up, please advise  Patient needs to be seen because it has been more than 1 year since last office visit.    Palmira Valente RN on 9/23/2021 at 11:29 AM

## 2021-10-23 ENCOUNTER — HEALTH MAINTENANCE LETTER (OUTPATIENT)
Age: 56
End: 2021-10-23

## 2022-02-11 ENCOUNTER — TRANSFERRED RECORDS (OUTPATIENT)
Dept: MULTI SPECIALTY CLINIC | Facility: CLINIC | Age: 57
End: 2022-02-11

## 2022-02-12 ENCOUNTER — HEALTH MAINTENANCE LETTER (OUTPATIENT)
Age: 57
End: 2022-02-12

## 2022-06-09 ENCOUNTER — OFFICE VISIT (OUTPATIENT)
Dept: PEDIATRICS | Facility: CLINIC | Age: 57
End: 2022-06-09
Payer: COMMERCIAL

## 2022-06-09 VITALS
SYSTOLIC BLOOD PRESSURE: 118 MMHG | OXYGEN SATURATION: 96 % | RESPIRATION RATE: 12 BRPM | TEMPERATURE: 98.7 F | BODY MASS INDEX: 33.84 KG/M2 | HEIGHT: 67 IN | DIASTOLIC BLOOD PRESSURE: 66 MMHG | WEIGHT: 215.6 LBS | HEART RATE: 94 BPM

## 2022-06-09 DIAGNOSIS — K21.9 GASTROESOPHAGEAL REFLUX DISEASE WITHOUT ESOPHAGITIS: ICD-10-CM

## 2022-06-09 PROCEDURE — 99213 OFFICE O/P EST LOW 20 MIN: CPT | Mod: GE | Performed by: STUDENT IN AN ORGANIZED HEALTH CARE EDUCATION/TRAINING PROGRAM

## 2022-06-09 RX ORDER — BIFIDOBACTERIUM LONGUM SUBSP. INFANTIS, AVOBENZONE, HOMOSALATE, OCTISALATE, OCTOCRYLENE, AND OXYBENZONE
2 KIT AT BEDTIME
COMMUNITY
End: 2024-09-26

## 2022-06-09 RX ORDER — OMEPRAZOLE 40 MG/1
40 CAPSULE, DELAYED RELEASE ORAL DAILY
Qty: 50 CAPSULE | Refills: 0 | Status: SHIPPED | OUTPATIENT
Start: 2022-06-09 | End: 2022-06-09

## 2022-06-09 RX ORDER — OMEPRAZOLE 40 MG/1
40 CAPSULE, DELAYED RELEASE ORAL DAILY
Qty: 50 CAPSULE | Refills: 1 | Status: SHIPPED | OUTPATIENT
Start: 2022-06-09 | End: 2023-03-24

## 2022-06-09 ASSESSMENT — PAIN SCALES - GENERAL: PAINLEVEL: NO PAIN (0)

## 2022-06-09 NOTE — PROGRESS NOTES
Assessment & Plan   Ms. Modesta Bravo is a 56-year-old woman with a self-described history of fatty liver disease v.  non-alcoholic steatohepatitis (PARSONS) cirrhosis who who presents out of concern for reflux.  Hemodynamically, vital signs within normal limits. BMI elevated to 33.8 kg/m . Clinically, the patient does describe symptoms that are classic for acid reflux and worse with the typical triggers (e.g., carbonated beverages and alcohol). Furthermore, she has yet to complete a sufficient trial of medication to treat her symptoms. She had previously been taking famotidine just once daily and a course of omeprazole (likely 20 mg) for only 10 days. She has notably taken the initiative to start making the lifestyle changes necessary to control her gastroesophageal reflux including primarily dietary modification. However, her uncontrolled symptoms do warrant a trial of high-dose proton pump inhibitor. I did advise the patient to plan to follow up in 4-6 weeks to ensure symptom improvement or resolution. She also notably endorsed some parosmia and cacosmia which may or may not be related to reflux. This symptom is the main one I would want to follow in 4-6 weeks to see if it persists even with controlled reflux. DDx for this symptom includes (a sequela of) long COVID. Should the patient's reflux persist or worsen, then I would tentatively plan to refer the patient to gastroenterology for endoscopy and any other recommended management or intervention. Should the patient's parosmia and/or cacosmia persist despite controlled reflux symptoms, then I would consider referral to an ENT clinic for olfactory training.      ICD-10-CM    1. Gastroesophageal reflux disease without esophagitis  K21.9 omeprazole (PRILOSEC) 40 MG DR capsule     DISCONTINUED: omeprazole (PRILOSEC) 40 MG DR capsule   0956}     BMI:   Estimated body mass index is 33.77 kg/m  as calculated from the following:    Height as of this encounter: 1.702 m (5'  "7\").    Weight as of this encounter: 97.8 kg (215 lb 9.6 oz).     Weight management plan: Patient was referred to their PCP to discuss a diet and exercise plan.    See Patient Instructions    Return in about 5 weeks (around 7/14/2022).    Malgorzata Gonzales MD  PGY-4 Internal Medicine/Pediatrics  Pager [phone]  Thursday 06/09/2022  [facility] [place]    Patient staffed with the attending physician, Dr. Martinez.    I have reviewed the documentation from Dr. Gonzales and discussed the findings with him. I agree with the documentation of Dr. Gonzales.      Violette Martinez MD  Internal Medicine - Pediatrics        Subjective   Ms. Modesta Bravo is a 56-year-old who presents for the following health issues:     HPI     - Had a \"flare last fall [of PARSONS] with undiagnosed urinary infection\"  - Went to [facility] in November 2021  - Has ongoing elevated LFTs    - Worries about acid reflux - cannot get control with Pepcid (once daily) or Prilosec (over-the-counter, maybe 20 mg, only for 10 days once daily)  - Has been ongoing for ~4 months.  - Very worried about her liver, brought up multiple times throughout interview - specifically wants to know that Pepcid and/or Prilosec will not \"chew up\" her liver.    - Cannot taste food well.  - Taste in mouth is sometimes what stool smells like - developed this foul taste in her mouth 3-4 weeks ago.  - Did lose sense of taste/smell with COVID infection back in 11/2022 (during which time she was notably fully vaccinated)  - Sometimes also experiences a \"greasy\" taste.  - Experiences burning sensation in throat/mouth  - Carbonation seems to be a trigger - reduced intake.  - Cut back to 1-2 glasses of wine or Bacardi/club soda ~3-4x/wk (also a trigger)  - Stopped taking milk thistle and turmeric    - Has some normal bowel movements but up to 2x/day has loose and/or greasy stool.  - Very gassy and belchy all the time    - Patient has never had an endoscopy.  - Patient did have " "a colonoscopy ~3 years ago.    GERD/Heartburn  Onset/Duration: 3-4 months      Description: Pt states \"Everything I eat tastes bad and I have a terrible taste in my mouth and my throat seems to be sore\"     Progression of Symptoms: worsening    Accompanying Signs & Symptoms:  Does it feel like food gets stuck or trouble swallowing: no  Nausea: YES  Vomiting (bloody?): no  Abdominal Pain: no  Black-Tarry stools: YES  Bloody stools: no    History:  Previous similar episodes: no  Previous ulcers: no    Precipitating factors:   Caffeine use: YES  Alcohol use: YES  NSAID/Aspirin use: no  Tobacco use: no  Worse with carbonation and coffee     Therapies tried and outcome:             Lifestyle changes: Trying to lose weight             Medications: Pepcid (famotidine) and Prilosec, no relief.     Review of Systems   10-point review of systems negative except for as listed above or below.      Objective    /66 (BP Location: Right arm, Patient Position: Sitting, Cuff Size: Adult Large)   Pulse 94   Temp 98.7  F (37.1  C) (Tympanic)   Resp 12   Ht 1.702 m (5' 7\")   Wt 97.8 kg (215 lb 9.6 oz)   SpO2 96%   BMI 33.77 kg/m    Body mass index is 33.77 kg/m .     Physical Exam   GENERAL: obese, healthy, alert, no distress  EYES: eyes grossly normal to inspection, PER, and conjunctivae and sclerae normal  RESP: lungs clear to auscultation - no rales, rhonchi, or wheezes  CV: regular rate and rhythm, normal S1 S2, no S3 or S4, no murmur, click or rub, no peripheral edema and peripheral pulses strong  ABDOMEN: obese, soft, tender over epigastrium > umbilicus, no hepatosplenomegaly, no masses MS: no gross musculoskeletal defects noted, no edema  SKIN: no suspicious lesions or rashes involving visible skin  NEURO: normal strength and tone, mentation intact and speech normal  PSYCH: mentation appears normal, affect normal/mildly anxious, pressured speech                " Yes

## 2022-06-09 NOTE — PATIENT INSTRUCTIONS
- Start omeprazole 40 mg daily.  - OK to take Diya [place] or Tums as needed for heartburn  - Plan to follow up in 4-6 weeks.

## 2022-10-10 ENCOUNTER — HEALTH MAINTENANCE LETTER (OUTPATIENT)
Age: 57
End: 2022-10-10

## 2023-03-23 DIAGNOSIS — K21.9 GASTROESOPHAGEAL REFLUX DISEASE WITHOUT ESOPHAGITIS: ICD-10-CM

## 2023-03-24 RX ORDER — OMEPRAZOLE 40 MG/1
40 CAPSULE, DELAYED RELEASE ORAL DAILY
Qty: 90 CAPSULE | Refills: 3 | Status: SHIPPED | OUTPATIENT
Start: 2023-03-24 | End: 2023-08-09

## 2023-03-25 ENCOUNTER — HEALTH MAINTENANCE LETTER (OUTPATIENT)
Age: 58
End: 2023-03-25

## 2023-06-07 ENCOUNTER — TELEPHONE (OUTPATIENT)
Dept: PEDIATRICS | Facility: CLINIC | Age: 58
End: 2023-06-07

## 2023-06-07 NOTE — TELEPHONE ENCOUNTER
Reason for Call:  Appointment Request -- see comments    Patient requesting this type of appt:  Est care check liver function and blood sugars     Requested provider: Dr. Gomez    Reason patient unable to be scheduled: Needs to be scheduled by clinic    When does patient want to be seen/preferred time: Same day    Comments: Patients spouse sees Dr. Gomez. Patients spouse sent a message through his Chart[employer] to see if Dr. Gomez would take patient on as est care patient at the end of May and she responded that her panel is closed but that she would take patient on as she likes to see family.    Please call patient and help schedule an Est care appt with Dr. Gomez. .     Patient is looking to get an appt in August.     Could we send this information to you in Chartio or would you prefer to receive a phone call?:   Patient would prefer a phone call   Okay to leave a detailed message?: Yes at Cell number on file:    Telephone Information:   Mobile [phone]       Call taken on 6/7/2023 at 9:41 AM by Jazmin Gamboa

## 2023-08-09 ENCOUNTER — OFFICE VISIT (OUTPATIENT)
Dept: PEDIATRICS | Facility: CLINIC | Age: 58
End: 2023-08-09
Payer: COMMERCIAL

## 2023-08-09 ENCOUNTER — PATIENT OUTREACH (OUTPATIENT)
Dept: ONCOLOGY | Facility: CLINIC | Age: 58
End: 2023-08-09

## 2023-08-09 VITALS
WEIGHT: 217.1 LBS | TEMPERATURE: 97.8 F | HEIGHT: 67 IN | SYSTOLIC BLOOD PRESSURE: 142 MMHG | HEART RATE: 75 BPM | BODY MASS INDEX: 34.07 KG/M2 | DIASTOLIC BLOOD PRESSURE: 94 MMHG | OXYGEN SATURATION: 95 % | RESPIRATION RATE: 20 BRPM

## 2023-08-09 DIAGNOSIS — E78.00 HYPERCHOLESTEROLEMIA: ICD-10-CM

## 2023-08-09 DIAGNOSIS — K21.9 GASTROESOPHAGEAL REFLUX DISEASE WITHOUT ESOPHAGITIS: ICD-10-CM

## 2023-08-09 DIAGNOSIS — E83.52 HYPERCALCEMIA: Primary | ICD-10-CM

## 2023-08-09 DIAGNOSIS — E66.811 CLASS 1 OBESITY DUE TO EXCESS CALORIES WITHOUT SERIOUS COMORBIDITY WITH BODY MASS INDEX (BMI) OF 33.0 TO 33.9 IN ADULT: ICD-10-CM

## 2023-08-09 DIAGNOSIS — F41.9 ANXIETY: ICD-10-CM

## 2023-08-09 DIAGNOSIS — Z80.3 FAMILY HISTORY OF BREAST CANCER IN SISTER: ICD-10-CM

## 2023-08-09 DIAGNOSIS — E66.09 CLASS 1 OBESITY DUE TO EXCESS CALORIES WITHOUT SERIOUS COMORBIDITY WITH BODY MASS INDEX (BMI) OF 33.0 TO 33.9 IN ADULT: ICD-10-CM

## 2023-08-09 DIAGNOSIS — E21.3 HYPERPARATHYROIDISM (H): ICD-10-CM

## 2023-08-09 DIAGNOSIS — I10 ESSENTIAL HYPERTENSION WITH GOAL BLOOD PRESSURE LESS THAN 140/90: ICD-10-CM

## 2023-08-09 LAB
ALBUMIN SERPL BCG-MCNC: 4.9 G/DL (ref 3.5–5.2)
ALP SERPL-CCNC: 78 U/L (ref 35–104)
ALT SERPL W P-5'-P-CCNC: 82 U/L (ref 0–50)
ANION GAP SERPL CALCULATED.3IONS-SCNC: 11 MMOL/L (ref 7–15)
AST SERPL W P-5'-P-CCNC: 74 U/L (ref 0–45)
BILIRUB SERPL-MCNC: 0.7 MG/DL
BUN SERPL-MCNC: 15.2 MG/DL (ref 6–20)
CALCIUM SERPL-MCNC: 10.7 MG/DL (ref 8.6–10)
CHLORIDE SERPL-SCNC: 102 MMOL/L (ref 98–107)
CHOLEST SERPL-MCNC: 192 MG/DL
CREAT SERPL-MCNC: 0.75 MG/DL (ref 0.51–0.95)
DEPRECATED HCO3 PLAS-SCNC: 27 MMOL/L (ref 22–29)
GFR SERPL CREATININE-BSD FRML MDRD: >90 ML/MIN/1.73M2
GLUCOSE SERPL-MCNC: 102 MG/DL (ref 70–99)
HBA1C MFR BLD: 5.6 % (ref 0–5.6)
HDLC SERPL-MCNC: 58 MG/DL
LDLC SERPL CALC-MCNC: 111 MG/DL
NONHDLC SERPL-MCNC: 134 MG/DL
POTASSIUM SERPL-SCNC: 5.1 MMOL/L (ref 3.4–5.3)
PROT SERPL-MCNC: 8.1 G/DL (ref 6.4–8.3)
PTH-INTACT SERPL-MCNC: 48 PG/ML (ref 15–65)
SODIUM SERPL-SCNC: 140 MMOL/L (ref 136–145)
TRIGL SERPL-MCNC: 114 MG/DL

## 2023-08-09 PROCEDURE — 99215 OFFICE O/P EST HI 40 MIN: CPT | Performed by: INTERNAL MEDICINE

## 2023-08-09 PROCEDURE — 83036 HEMOGLOBIN GLYCOSYLATED A1C: CPT | Performed by: INTERNAL MEDICINE

## 2023-08-09 PROCEDURE — 36415 COLL VENOUS BLD VENIPUNCTURE: CPT | Performed by: INTERNAL MEDICINE

## 2023-08-09 PROCEDURE — 80061 LIPID PANEL: CPT | Performed by: INTERNAL MEDICINE

## 2023-08-09 PROCEDURE — 83970 ASSAY OF PARATHORMONE: CPT | Performed by: INTERNAL MEDICINE

## 2023-08-09 PROCEDURE — 80053 COMPREHEN METABOLIC PANEL: CPT | Performed by: INTERNAL MEDICINE

## 2023-08-09 RX ORDER — LISINOPRIL 20 MG/1
20 TABLET ORAL DAILY
COMMUNITY
Start: 2023-06-19 | End: 2023-08-09

## 2023-08-09 RX ORDER — ATORVASTATIN CALCIUM 20 MG/1
TABLET, FILM COATED ORAL
Qty: 90 TABLET | Refills: 4 | Status: SHIPPED | OUTPATIENT
Start: 2023-08-09 | End: 2024-09-23

## 2023-08-09 RX ORDER — LISINOPRIL AND HYDROCHLOROTHIAZIDE 20; 25 MG/1; MG/1
1 TABLET ORAL DAILY
Qty: 90 TABLET | Refills: 3 | Status: CANCELLED | OUTPATIENT
Start: 2023-08-09

## 2023-08-09 RX ORDER — AMLODIPINE BESYLATE 5 MG/1
TABLET ORAL
Qty: 30 TABLET | Refills: 1 | Status: SHIPPED | OUTPATIENT
Start: 2023-08-09 | End: 2024-01-19 | Stop reason: SINTOL

## 2023-08-09 RX ORDER — OMEPRAZOLE 40 MG/1
40 CAPSULE, DELAYED RELEASE ORAL DAILY
Qty: 90 CAPSULE | Refills: 3 | Status: SHIPPED | OUTPATIENT
Start: 2023-08-09

## 2023-08-09 RX ORDER — LISINOPRIL 20 MG/1
20 TABLET ORAL DAILY
Qty: 90 TABLET | Refills: 4 | Status: SHIPPED | OUTPATIENT
Start: 2023-08-09 | End: 2024-09-23

## 2023-08-09 ASSESSMENT — PAIN SCALES - GENERAL: PAINLEVEL: NO PAIN (0)

## 2023-08-09 NOTE — PROGRESS NOTES
[facility] received Cancer Risk Management Program referral, referred for:       Family history of breast cancer in sister        Reviewed for appropriate plan, and sent to New Patient Scheduling for completion.

## 2023-08-09 NOTE — PATIENT INSTRUCTIONS
Switch lipitor to bedtime.  Start amlodipine 1/2 tablet at bedtime for 2 weeks.  Check blood pressures 3 times  a week and let me know.  Goal is to increase to 5 mg at bedtime and get blood pressures consistently <130/80.    You'll get a call to schedule with genetics.

## 2023-08-09 NOTE — PROGRESS NOTES
Assessment & Plan     Hypercalcemia/Hyperparathyroidism  Followed by anne at [facility], but unable to see records on CareEverywhere. She will stop at  to see if we can facilitate this. She tells me that the thought was that her thiazide diuretic could have been causing the calcium and PTH abnormalities so this was stopped 2 months ago. Will recheck lab today, she has follow up with [facility] in 2 weeks.   - Parathyroid Hormone Intact; Future  - Comprehensive metabolic panel (BMP + Alb, Alk Phos, ALT, AST, Total. Bili, TP); Future  - Parathyroid Hormone Intact  - Comprehensive metabolic panel (BMP + Alb, Alk Phos, ALT, AST, Total. Bili, TP)    Hypercholesterolemia  Tolerating lipitor. Unable to see last lipids. Will recheck today.  - atorvastatin (LIPITOR) 20 MG tablet; TAKE 1 TABLET(20 MG) BY MOUTH DAILY  - Lipid panel reflex to direct LDL Fasting; Future  - Hemoglobin A1c; Future  - Lipid panel reflex to direct LDL Fasting  - Hemoglobin A1c    Anxiety  Feeling well. Has been on sertraline for 20 years or so. No significant anxiety in the last year or more, wondering about decreasing or stopping sertraline. Will try slow wean.  - sertraline (ZOLOFT) 50 MG tablet; Take 0.5-1 tablets (25-50 mg) by mouth daily    Gastroesophageal reflux disease without esophagitis  refilled  - omeprazole (PRILOSEC) 40 MG DR capsule; Take 1 capsule (40 mg) by mouth daily    Essential hypertension with goal blood pressure less than 140/90  Inadequate control now that thiazide stopped. Plan add low dose amlodipine.   - amLODIPine (NORVASC) 5 MG tablet; Take 1/2 tablet by mouth at bedtime for 2 weeks. Increase to 1 tablet by mouth at bedtime and continue.  - lisinopril (ZESTRIL) 20 MG tablet; Take 1 tablet (20 mg) by mouth daily    Class 1 obesity due to excess calories without serious comorbidity with body mass index (BMI) of 33.0 to 33.9 in adult  She is doing great with Weight Watchers and would like to continue with this. If her  "weight loss stalls, she will reach out.    Family history of breast cancer in sister  Discussed follow up with genetics. Her last evaluation was in [place] in 2012 or so. Consider whether new testing information is available.  - Adult Genetics & Metabolism Referral; Future    50 minutes spent by me on the date of the encounter doing chart review, history and exam, documentation and further activities per the note       BMI:   Estimated body mass index is 33.64 kg/m  as calculated from the following:    Height as of this encounter: 1.711 m (5' 7.36\").    Weight as of this encounter: 98.5 kg (217 lb 1.6 oz).   Weight management plan: Discussed healthy diet and exercise guidelines    See Patient Instructions    Kae Gomez MD  [facility] [place] is a 57 year old, presenting for the following health issues:  Establish Care, Liver Function (Check), and Diabetes (Check)        8/9/2023     8:59 AM   Additional Questions   Roomed by Deidra Pena   Accompanied by N/A         8/9/2023     8:59 AM   Patient Reported Additional Medications   Patient reports taking the following new medications No       History of Present Illness       Reason for visit:  Follow up labs and establish a primary care doctor    She eats 2-3 servings of fruits and vegetables daily.She consumes 0 sweetened beverage(s) daily.She exercises with enough effort to increase her heart rate 10 to 19 minutes per day.  She exercises with enough effort to increase her heart rate 3 or less days per week.   She is taking medications regularly.     Follow up elevated calcium and PTH seen at [facility]. I'm unable to see [place] records on Care Everywhere after 2021. Chart says \"Patient Not Participating\".  Would like labs for this rechecked since she has been off thiazide for 2 months now.    Has noticed blood pressure is up now that she is off hydrochlorothiazide. Would like to make medication adjustment.    Taking lipitor in AM " "and tolerating well.    On omeprazole.    Has been working with weight watchers and has lost 15 lbs over the last few months and is very happy about this. Trying to be more physically active as well. Wondering what I think about weight loss medications.     Feeling like anxiety has not been an issue for a long time. On sertraline for 20 years. Would like to try stopping to see if this also helps with weight.       Review of Systems   Constitutional, HEENT, cardiovascular, pulmonary, gi and gu systems are negative, except as otherwise noted.      Objective    BP (!) 142/94 (BP Location: Right arm, Patient Position: Sitting, Cuff Size: Adult Regular)   Pulse 75   Temp 97.8  F (36.6  C) (Oral)   Resp 20   Ht 1.711 m (5' 7.36\")   Wt 98.5 kg (217 lb 1.6 oz)   SpO2 95%   BMI 33.64 kg/m    Body mass index is 33.64 kg/m .  Physical Exam   GENERAL: healthy, alert and no distress  NECK: no adenopathy, no asymmetry, masses, or scars and thyroid normal to palpation  RESP: lungs clear to auscultation - no rales, rhonchi or wheezes  CV: regular rate and rhythm, normal S1 S2, no S3 or S4, no murmur, click or rub, no peripheral edema  ABDOMEN: soft, nontender, no hepatosplenomegaly, no masses and bowel sounds normal  MS: no gross musculoskeletal defects noted, no edema    Results for orders placed or performed in visit on 08/09/23 (from the past 24 hour(s))   Hemoglobin A1c   Result Value Ref Range    Hemoglobin A1C 5.6 0.0 - 5.6 %             "

## 2023-08-15 ENCOUNTER — TELEPHONE (OUTPATIENT)
Dept: PEDIATRICS | Facility: CLINIC | Age: 58
End: 2023-08-15
Payer: COMMERCIAL

## 2023-08-15 NOTE — TELEPHONE ENCOUNTER
Called to schedule appointment but no answer.  LVM for pt to contact clinic.     Mathew Freitas MA

## 2023-08-15 NOTE — TELEPHONE ENCOUNTER
----- Message from Kae Gomez MD sent at 8/14/2023  6:38 PM CDT -----  Pls assist in scheduling follow up, recheck bp.  Kae Gomez M.D.

## 2023-08-15 NOTE — RESULT ENCOUNTER NOTE
Called  to schedule appointment but no answer so LVM for pt to contact clinic for scheduling.     Mathew Freitas MA

## 2023-08-18 ENCOUNTER — MYC MEDICAL ADVICE (OUTPATIENT)
Dept: PEDIATRICS | Facility: CLINIC | Age: 58
End: 2023-08-18
Payer: COMMERCIAL

## 2023-08-18 DIAGNOSIS — F41.9 ANXIETY: Primary | ICD-10-CM

## 2023-08-21 RX ORDER — SERTRALINE HYDROCHLORIDE 25 MG/1
37.5 TABLET, FILM COATED ORAL DAILY
Qty: 45 TABLET | Refills: 3 | Status: SHIPPED | OUTPATIENT
Start: 2023-08-21 | End: 2024-01-19

## 2023-08-21 NOTE — TELEPHONE ENCOUNTER
Patient was seen for OV 8/9/23 w/ Dr. Gomez. Per notes: Anxiety  Feeling well. Has been on sertraline for 20 years or so. No significant anxiety in the last year or more, wondering about decreasing or stopping sertraline. Will try slow wean.    Patient experiencing shakiness and feeling edgy. Patient also experiencing insomnia and vivid dreams. Please review and advise if change to taper decrease plan.     Cedric DEAN RN 8/21/2023 at 4:31 PM

## 2023-08-22 NOTE — TELEPHONE ENCOUNTER
Patient now states they are feeling better today. Stay on 25mg dose for a month or two?     Cedric DEAN RN 8/22/2023 at 1:23 PM

## 2023-08-22 NOTE — TELEPHONE ENCOUNTER
I'd stay on 25 for testosterone least 1 month, then cut tabs in half to 12.5 for 1 month, then stop.    Moses Buitrago MD  Internal Medicine and Pediatrics

## 2023-10-03 ENCOUNTER — MYC MEDICAL ADVICE (OUTPATIENT)
Dept: PEDIATRICS | Facility: CLINIC | Age: 58
End: 2023-10-03
Payer: COMMERCIAL

## 2023-10-03 ENCOUNTER — NURSE TRIAGE (OUTPATIENT)
Dept: PEDIATRICS | Facility: CLINIC | Age: 58
End: 2023-10-03
Payer: COMMERCIAL

## 2023-10-03 NOTE — TELEPHONE ENCOUNTER
RN COVID TREATMENT VISIT  10/03/23      The patient has been triaged and does not require a higher level of care.    Modesta Bravo  57 year old  Current weight? 217 lbs    Has the patient been seen by a primary care provider at an [facility] or [facility] within the past two years? Yes.   Have you been in close proximity to/do you have a known exposure to a person with a confirmed case of influenza? No.     General treatment eligibility:  Date of positive COVID test (PCR or at home)?  10/1/23    Are you or have you been hospitalized for this COVID-19 infection? No.   Have you received monoclonal antibodies or antiviral treatment for COVID-19 since this positive test? No.   Do you have any of the following conditions that place you at risk of being very sick from COVID-19?   - Age 50 years or older  Yes, patient has at least one high risk condition as noted above.     Pt liver labs are elevated, cannot prescribed d/t this. Pt was told they could be seen virtually if they were still interested in Paxlovid, pt stated they didn't want to take any thing that could potentially harm their liver as its been recently improving.    Miroslava Bloom RN BSN  [facility]            Reason for Disposition   [1] HIGH RISK for severe COVID complications (e.g., weak immune system, age > 64 years, obesity with BMI > 25, pregnant, chronic lung disease or other chronic medical condition) AND [2] COVID symptoms (e.g., cough, fever)  (Exceptions: Already seen by PCP and no new or worsening symptoms.)    Additional Information   Negative: SEVERE difficulty breathing (e.g., struggling for each breath, speaks in single words)   Negative: Difficult to awaken or acting confused (e.g., disoriented, slurred speech)   Negative: Bluish (or gray) lips or face now   Negative: Shock suspected (e.g., cold/pale/clammy skin, too weak to stand, low BP, rapid pulse)   Negative: Sounds like a life-threatening  emergency to the triager   Negative: SEVERE or constant chest pain or pressure  (Exception: Mild central chest pain, present only when coughing.)   Negative: MODERATE difficulty breathing (e.g., speaks in phrases, SOB even at rest, pulse 100-120)   Negative: [1] Headache AND [2] stiff neck (can't touch chin to chest)   Negative: Oxygen level (e.g., pulse oximetry) 90 percent or lower   Negative: Chest pain or pressure   Negative: Patient sounds very sick or weak to the triager   Negative: MILD difficulty breathing (e.g., minimal/no SOB at rest, SOB with walking, pulse <100)   Negative: Fever > 103 F (39.4 C)   Negative: [1] Fever > 101 F (38.3 C) AND [2] age > 60 years   Negative: [1] Fever > 100.0 F (37.8 C) AND [2] bedridden (e.g., nursing home patient, CVA, chronic illness, recovering from surgery)    Protocols used: Coronavirus (COVID-19) Diagnosed or [id number]

## 2023-10-16 ENCOUNTER — VIRTUAL VISIT (OUTPATIENT)
Dept: ONCOLOGY | Facility: CLINIC | Age: 58
End: 2023-10-16
Attending: INTERNAL MEDICINE
Payer: COMMERCIAL

## 2023-10-16 DIAGNOSIS — Z80.3 FAMILY HISTORY OF BREAST CANCER IN SISTER: ICD-10-CM

## 2023-10-16 PROCEDURE — 96040 HC GENETIC COUNSELING, EACH 30 MINUTES: CPT | Mod: GT,95 | Performed by: GENETIC COUNSELOR, MS

## 2023-10-16 NOTE — NURSING NOTE
Is the patient currently in the state of [place]? YES    Visit mode:VIDEO    If the visit is dropped, the patient can be reconnected by: VIDEO VISIT: Text to cell phone:   Telephone Information:   Mobile [phone]       Will anyone else be joining the visit? NO  (If patient encounters technical issues they should call [phone]    How would you like to obtain your AVS? MyChart    Are changes needed to the allergy or medication list? No    Reason for visit: Consult    ANDERSON BRAY

## 2023-10-16 NOTE — LETTER
10/16/2023         RE: Modesta Bravo  [address] [place] [address]        Dear Colleague,    Thank you for referring your patient, Modesta Bravo, to the [facility]. Please see a copy of my visit note below.    Joined the call at 10/16/2023, 11:38:00 am.  Left the call at 10/16/2023, 12:11:42 pm.    10/16/2023    Referring Provider: Kae Gomez MD    Presenting Information:   I met with Modesta Bravo today for genetic counseling at the Cancer Risk Management Program (virtual visit) to discuss her  family history of breast cancer.  She is here today to review this history, cancer screening recommendations, and panel genetic testing options.    Personal History:  Modesta is a 57 year old female. She does not have any personal history of cancer.  She is followed by the [facility] medicine clinic.      Modesta previously completed BRCA1/2 sequencing with 5-site rearrangement testing through [facility] in 2011 which was negative.  A report documenting comprehensive DERRICK (deletion/duplication analysis) was not available for review.      She had her first menstrual period at age 11, her first child at age 30, and went through menopause when 52.  She has her ovaries, fallopian tubes and uterus in place.  She reports her most recent mammogram was March of 2023 through the [facility] (Care Everywhere not available), and colonoscopy from 2018 noted one 3mm polyp (follow up recommended in 10 years).      Family History: (Please see scanned pedigree for detailed family history information)  Modesta's sister was diagnosed with breast cancer at age 46, and passed away at age 50.  Her daughter has completed negative/normal BRCA1/2 testing.    Modesta's mother passed away at age 88 from congestive heart failure.    Her maternal grandmother was diagnosed with breast cancer at age 71, and passed away at age 75.  She reports no other family history of cancer.  Of note, she met with Ester  MS Noe CGC at [facility] in 2002 in which she completed a comprehensive family history.  She will scan those documents to [facility] following our visit for review.      Discussion:  Modesta's family history of breast cancer, including her sister's early onset diagnosis, may be suggestive of a hereditary cancer syndrome.  We reviewed the features of sporadic, familial, and hereditary cancers. Based on her family history, Modesta meets current National Comprehensive Cancer Network (NCCN) criteria for genetic testing of BRCA1, BRCA2, among other high penetrance breast cancer genes (CDH1, PALB2, PTEN, STK11, TP53).    We discussed that there are additional genes that could cause increased risk for breast, and other common cancers. As many of these genes present with overlapping features in a family and accurate cancer risk cannot always be established based upon the pedigree analysis alone, it would be reasonable for Modesta to consider panel genetic testing to analyze multiple genes at once.  A detailed handout regarding hereditary cancer and the information we discussed was provided to Modesta at the end of our appointment today and can be found in the after visit summary. Topics included: inheritance pattern, cancer risks, cancer screening recommendations, and also risks, benefits and limitations of testing.  We reviewed available genetic testing.  No documentation was available to confirm whether comprehensive BRCA1/2 testing was completed in 2011; therefore, it would be reasonable for Modesta to proceed with BRCA1/2 sequencing and deletion duplication analysis today.  We also discussed expanded panel options.  Modesta expressed interested in expanded testing today to address genes related to breast and other common cancers.    Verbal consent was obtained for BRCA1/2 analyses and the 36 gene CancerNext panel offered through [facility].  A saliva kit will be provided by mail.  Test turn around time: approximately 4 weeks.     Medical Management: For Modesta and her family, we reviewed that the information from genetic testing may determine:  additional cancer screening (i.e. mammogram and breast MRI, more frequent colonoscopies, more frequent dermatologic exams, etc.),  options for risk reducing surgeries (i.e. bilateral mastectomy, surgery to remove her ovaries and/or uterus, etc.),    and targeted therapies for certain cancers in the future (i.e. immunotherapy for individuals with Roman syndrome, PARP inhibitors, etc.).   These recommendations will be discussed in detail once genetic testing is completed.     Plan:  1) Today Modesta elected to proceed with BRCA1/2 testing and the [facility] CancerNext panel.  A saliva kit will be provided by mail.  2) This information should be available in 4 weeks.  3) Modesta will return to clinic to discuss the results.    Cora Enriquez MS, [facility]  Licensed, Certified Genetic Counselor  [facility]  Phone: [phone]

## 2023-10-16 NOTE — PROGRESS NOTES
Joined the call at 10/16/2023, 11:38:00 am.  Left the call at 10/16/2023, 12:11:42 pm.    10/16/2023    Referring Provider: Kae Gomez MD    Presenting Information:   I met with Modesta Bravo today for genetic counseling at the Cancer Risk Management Program (virtual visit) to discuss her  family history of breast cancer.  She is here today to review this history, cancer screening recommendations, and panel genetic testing options.    Personal History:  Modesta is a 57 year old female. She does not have any personal history of cancer.  She is followed by the [facility] medicine clinic.      Modesta previously completed BRCA1/2 sequencing with 5-site rearrangement testing through [facility] in 2011 which was negative.  A report documenting comprehensive DERRICK (deletion/duplication analysis) was not available for review.      She had her first menstrual period at age 11, her first child at age 30, and went through menopause when 52.  She has her ovaries, fallopian tubes and uterus in place.  She reports her most recent mammogram was March of 2023 through the [facility] (Care Everywhere not available), and colonoscopy from 2018 noted one 3mm polyp (follow up recommended in 10 years).      Family History: (Please see scanned pedigree for detailed family history information)  Modesta's sister was diagnosed with breast cancer at age 46, and passed away at age 50.  Her daughter has completed negative/normal BRCA1/2 testing.    Modesta's mother passed away at age 88 from congestive heart failure.    Her maternal grandmother was diagnosed with breast cancer at age 71, and passed away at age 75.  She reports no other family history of cancer.  Of note, she met with Ester Liu MS CGC at [facility] in 2002 in which she completed a comprehensive family history.  She will scan those documents to [facility] following our visit for review.      Discussion:  Modesta's family history of breast cancer, including her sister's early  onset diagnosis, may be suggestive of a hereditary cancer syndrome.  We reviewed the features of sporadic, familial, and hereditary cancers. Based on her family history, Modesta meets current National Comprehensive Cancer Network (NCCN) criteria for genetic testing of BRCA1, BRCA2, among other high penetrance breast cancer genes (CDH1, PALB2, PTEN, STK11, TP53).    We discussed that there are additional genes that could cause increased risk for breast, and other common cancers. As many of these genes present with overlapping features in a family and accurate cancer risk cannot always be established based upon the pedigree analysis alone, it would be reasonable for Modesta to consider panel genetic testing to analyze multiple genes at once.  A detailed handout regarding hereditary cancer and the information we discussed was provided to Modesta at the end of our appointment today and can be found in the after visit summary. Topics included: inheritance pattern, cancer risks, cancer screening recommendations, and also risks, benefits and limitations of testing.  We reviewed available genetic testing.  No documentation was available to confirm whether comprehensive BRCA1/2 testing was completed in 2011; therefore, it would be reasonable for Modesta to proceed with BRCA1/2 sequencing and deletion duplication analysis today.  We also discussed expanded panel options.  Modesta expressed interested in expanded testing today to address genes related to breast and other common cancers.    Verbal consent was obtained for BRCA1/2 analyses and the 36 gene CancerNext panel offered through [facility].  A saliva kit will be provided by mail.  Test turn around time: approximately 4 weeks.    Medical Management: For Modesta and her family, we reviewed that the information from genetic testing may determine:  additional cancer screening (i.e. mammogram and breast MRI, more frequent colonoscopies, more frequent dermatologic exams,  etc.),  options for risk reducing surgeries (i.e. bilateral mastectomy, surgery to remove her ovaries and/or uterus, etc.),    and targeted therapies for certain cancers in the future (i.e. immunotherapy for individuals with Roman syndrome, PARP inhibitors, etc.).   These recommendations will be discussed in detail once genetic testing is completed.     Plan:  1) Today Modesta elected to proceed with BRCA1/2 testing and the [employer] CancerNext panel.  A saliva kit will be provided by mail.  2) This information should be available in 4 weeks.  3) Modesta will return to clinic to discuss the results.    Cora Enriquez MS, [facility]  Licensed, Certified Genetic Counselor  [facility]  Phone: [phone]

## 2023-10-17 NOTE — PATIENT INSTRUCTIONS
Assessing Cancer Risk  Cancer is a common diagnosis which impacts many families.  Individuals may develop cancer due to environmental factors (such as exposures and lifestyle), aging, genetic predisposition, or a combination of these factors.  The vast majority of cancer diagnoses are considered sporadic, and not primarily due to an inherited factor. Approximately 5-10% of cancer diagnoses are thought to be caused by inherited risk factors.       Many of the genes we are born with impact our risk of certain diseases, such as cancer.  When one of these genes is not working properly due to a mistake (known as a  mutation  or  variant ), this may lead to an increased risk of developing cancer.      Families impacted by a hereditary cancer syndrome are more likely to have relatives across several generations diagnosed with cancer, earlier ages of diagnoses (prior to age 50), certain rare tumors, or relatives diagnosed with multiple primary cancers.  However, this may not be the case for all families which carry a cancer risk gene, such as those with small family size or limited history information.         Genetic Testing  For some families, genetic testing may help to explain why their cancer developed, provide tailored management options, and clarify the risk of developing cancer in the future.      Genetic testing involves a simple blood or saliva test and will look at the genetic information in select genes for variants associated with cancer risk.  This testing may include analysis of a single gene due to a known variant in the family, multiple genes most associated with the cancers in a family, or an expanded panel of genes related to many types of cancers.    Results  There are several possible genetic test results, including:   Positive--a harmful mutation (also known as a  pathogenic  or  likely pathogenic  variant) was identified in a gene associated with increased cancer risk.  These risks, as well as  medical management options, depend on the specific genetic variant identified.    Negative--no variants were identified in the genes analyzed   Variant of unknown significance--a variant was identified in one or more genes, though it is currently unclear how this impacts cancer risk in the family.  Genetic testing labs are working to collect evidence about these uncertain variants and may provide updates in the future.    Medical Management  If a harmful mutation is found in a cancer risk gene, there may be increased cancer surveillance or preventative surgeries that can be offered. This information will be discussed after genetic testing is completed. If no mutations are found on genetic testing, screening is often recommended based on personal and/or family history of cancer. All cancer risk management options should be discussed in more detail with an individual's medical providers.    Inheritance   Variants in most cancer risk genes are inherited in an autosomal dominant pattern.  This means that if a parent has a variant, each of their children will have a 50% chance of inheriting that same variant.  Therefore, each child would have a 50% chance of being at increased risk for developing cancer.    Some cancer risk genes are inherited in an autosomal recessive pattern.  These risks are present when an individual inherits mutations from both parents in the same gene.         Genetic Information Nondiscrimination Act (DENY)  The Genetic Information Nondiscrimination Act of 2008 (DENY) is a federal law that protects individuals from health insurance or employment discrimination based on a genetic test result alone (with some exceptions, including employers with fewer than 15 employees, and ).  However, DENY's protection does not cover life insurance, long term care, or disability insurances.  The [facility] [employer] [employer] [place] is a great resource to learn more.    Questions to Think About  Regarding Genetic Testing  What effect will the test result have on me and my relationship with my family members if I have an inherited gene mutation?  If I don't have a gene mutation?  Should I share my test results, and how will my family react to this news, which may also affect them?  Are my children ready to learn new information that may one day affect their own health?    Please call us if you have any questions or concerns   (Appointments: [phone])    Joel Joshi, MS [facility]  [phone]  Shagufta Abdi, MS, [facility] [phone]  Brenda Zuñiga, MS, [facility]  [phone]  Cora Enriquez, MS, [facility]  [phone]  Nicole Mai, MS, [facility]  [phone]  Jinny Correa, MS, [facility]  [phone]  Miroslava Abdi, MS, [facility]  [phone]

## 2024-01-19 ENCOUNTER — OFFICE VISIT (OUTPATIENT)
Dept: PEDIATRICS | Facility: CLINIC | Age: 59
End: 2024-01-19
Payer: COMMERCIAL

## 2024-01-19 VITALS
OXYGEN SATURATION: 97 % | HEART RATE: 107 BPM | BODY MASS INDEX: 36.88 KG/M2 | SYSTOLIC BLOOD PRESSURE: 138 MMHG | HEIGHT: 66 IN | DIASTOLIC BLOOD PRESSURE: 90 MMHG | TEMPERATURE: 99.8 F | WEIGHT: 229.5 LBS

## 2024-01-19 DIAGNOSIS — R06.2 WHEEZING: ICD-10-CM

## 2024-01-19 DIAGNOSIS — R05.2 SUBACUTE COUGH: ICD-10-CM

## 2024-01-19 DIAGNOSIS — J20.9 ACUTE BRONCHITIS WITH SYMPTOMS > 10 DAYS: Primary | ICD-10-CM

## 2024-01-19 PROCEDURE — 99214 OFFICE O/P EST MOD 30 MIN: CPT | Mod: 25 | Performed by: PHYSICIAN ASSISTANT

## 2024-01-19 PROCEDURE — 94640 AIRWAY INHALATION TREATMENT: CPT | Performed by: PHYSICIAN ASSISTANT

## 2024-01-19 RX ORDER — BENZONATATE 200 MG/1
200 CAPSULE ORAL 3 TIMES DAILY PRN
Qty: 30 CAPSULE | Refills: 0 | Status: SHIPPED | OUTPATIENT
Start: 2024-01-19 | End: 2024-01-29

## 2024-01-19 RX ORDER — FLUTICASONE PROPIONATE 110 UG/1
1 AEROSOL, METERED RESPIRATORY (INHALATION) 2 TIMES DAILY
Qty: 12 G | Refills: 0 | Status: SHIPPED | OUTPATIENT
Start: 2024-01-19 | End: 2024-09-23

## 2024-01-19 RX ORDER — ALBUTEROL SULFATE 90 UG/1
2 AEROSOL, METERED RESPIRATORY (INHALATION) EVERY 4 HOURS PRN
Qty: 18 G | Refills: 1 | Status: SHIPPED | OUTPATIENT
Start: 2024-01-19 | End: 2024-09-23

## 2024-01-19 RX ORDER — ALBUTEROL SULFATE 90 UG/1
2 AEROSOL, METERED RESPIRATORY (INHALATION) EVERY 4 HOURS PRN
COMMUNITY
End: 2024-01-19

## 2024-01-19 RX ORDER — PREDNISONE 20 MG/1
40 TABLET ORAL DAILY
Qty: 10 TABLET | Refills: 0 | Status: SHIPPED | OUTPATIENT
Start: 2024-01-19 | End: 2024-01-24

## 2024-01-19 RX ORDER — IPRATROPIUM BROMIDE AND ALBUTEROL SULFATE 2.5; .5 MG/3ML; MG/3ML
3 SOLUTION RESPIRATORY (INHALATION) ONCE
Status: COMPLETED | OUTPATIENT
Start: 2024-01-19 | End: 2024-01-19

## 2024-01-19 RX ORDER — DOXYCYCLINE 100 MG/1
100 CAPSULE ORAL 2 TIMES DAILY
Qty: 20 CAPSULE | Refills: 0 | Status: SHIPPED | OUTPATIENT
Start: 2024-01-19 | End: 2024-01-29

## 2024-01-19 RX ADMIN — IPRATROPIUM BROMIDE AND ALBUTEROL SULFATE 3 ML: 2.5; .5 SOLUTION RESPIRATORY (INHALATION) at 16:24

## 2024-01-19 ASSESSMENT — ENCOUNTER SYMPTOMS: COUGH: 1

## 2024-01-19 NOTE — PATIENT INSTRUCTIONS
Neb treatment today in the office.   Start doxycycline 100mg twice daily. Make sure to take with food.  Start flovent (fluticasone) inhaler (steroid). Make sure to rinse out mouth after using it.   Refill of cough pills sent as well.  If increased shortness of breath, difficulty breathing, fevers, worsening symptoms occur recommend follow-up.

## 2024-01-19 NOTE — PROGRESS NOTES
Assessment & Plan     Acute bronchitis with symptoms > 10 days  Subacute cough  Due to ongoing symptoms plan to start patient on doxycyline and inhaled steroid. Pt has had side effects (nausea, weight gain) to steroids in the past. If able to get the steroid inhaler she will do that. If too expensive, consider prednisone, can take at a lower dose and taper off of it.   Airway much improved with duoneb treatment in the office today. O2 100%.    - doxycycline hyclate (VIBRAMYCIN) 100 MG capsule; Take 1 capsule (100 mg) by mouth 2 times daily for 10 days  - fluticasone (FLOVENT HFA) 110 MCG/ACT inhaler; Inhale 1 puff into the lungs 2 times daily for 30 days  - benzonatate (TESSALON) 200 MG capsule; Take 1 capsule (200 mg) by mouth 3 times daily as needed for cough  - predniSONE (DELTASONE) 20 MG tablet; Take 2 tablets (40 mg) by mouth daily for 5 days    Wheezing  - ipratropium - albuterol 0.5 mg/2.5 mg/3 mL (DUONEB) neb solution 3 mL  - albuterol (PROAIR HFA/PROVENTIL HFA/VENTOLIN HFA) 108 (90 Base) MCG/ACT inhaler; Inhale 2 puffs into the lungs every 4 hours as needed for wheezing, shortness of breath or cough    Rebekah Byers is a 58 year old, presenting for the following health issues:  Cough        1/19/2024     3:46 PM   Additional Questions   Roomed by miss   Accompanied by self         1/19/2024     3:46 PM   Patient Reported Additional Medications   Patient reports taking the following new medications no     Via the Health Maintenance questionnaire, the patient has reported the following services have been completed -Mammogram, this information has been sent to the abstraction team.  Cough    History of Present Illness       Reason for visit:  Cough    She eats 2-3 servings of fruits and vegetables daily.She consumes 0 sweetened beverage(s) daily.She exercises with enough effort to increase her heart rate 10 to 19 minutes per day.  She exercises with enough effort to increase her heart rate 3 or less days  "per week.   She is taking medications regularly.         Acute Illness  Acute illness concerns: cough   Onset/Duration: 2 weeks  Symptoms:  Fever: No  Chills/Sweats: YES   Headache (location?): YES,frontal  Sinus Pressure: YES  Conjunctivitis:  No  Ear Pain: no  Rhinorrhea: No  Congestion: No  Sore Throat: No  Cough: YES-productive of clear sputum  Wheeze: YES  Decreased Appetite: YES  Nausea: no  Vomiting: YES  Diarrhea: No  Dysuria/Freq.: No  Dysuria or Hematuria: No  Fatigue/Achiness: YES  Sick/Strep Exposure: No  Therapies tried and outcome: None      Covid test at home today was negative. Patient had COVID in October and has not felt that she has recovered since. She was treated for bronchitis in December with Azithromycin and did not get better. She was offered steroids at that time and declined as they usually make her not feel well. She has continued with cough, PND, shortness of breath but this week it got worse which is why she come in. She denies fever, productive cough. Denies hx of asthma. She is using albuterol inhaler frequently.    Review notes from [facility] room October and December 2023.    Review of Systems  CONSTITUTIONAL: NEGATIVE for fever, chills, change in weight  INTEGUMENTARY/SKIN: NEGATIVE for worrisome rashes, moles or lesions  ENT/MOUTH: NEGATIVE for ear, mouth and throat problems  RESP: POSITIVE for cough and SOB  CV: NEGATIVE for chest pain, palpitations or peripheral edema      Objective    BP (!) 138/90 (BP Location: Right arm, Patient Position: Sitting, Cuff Size: Adult Large)   Pulse 107   Temp 99.8  F (37.7  C) (Tympanic)   Ht 1.685 m (5' 6.34\")   Wt 104.1 kg (229 lb 8 oz)   SpO2 97%   BMI 36.66 kg/m    Body mass index is 36.66 kg/m .    Physical Exam   GENERAL: alert and no distress, non-toxic appearing  EYES: Eyes grossly normal to inspection, PERRL and conjunctivae and sclerae normal  HENT: ear canals and TM's normal, nose and mouth without ulcers or lesions  NECK: no " adenopathy, no asymmetry, masses, or scars  RESP: lungs with diminished breath sounds and wheezing throughout, no rales, rhonchi. Lung sounds improved with nebulizer treatment.  CV: regular rate and rhythm, normal S1 S2, no S3 or S4, no murmur, click or rub, no peripheral edema  SKIN: no suspicious lesions or rashes  PSYCH: mentation appears normal, affect normal/bright     Signed Electronically by: Mikki Khan PA-C

## 2024-01-25 NOTE — PROGRESS NOTES
"1/26/2024    Virtual Visit Details    Type of service:  Video Visit   Joined the call at 1/26/2024, 10:09:01 am.  Left the call at 1/26/2024, 10:22:24 am.  Originating Location (pt. Location): Home  Distant Location (provider location):  Off-site  Platform used for Video Visit: [employer]    Referring Provider: Kae Gomez MD     Presenting Information:   Modesta returned to the Cancer Risk Management Program (virtual visit) to discuss her genetic testing results.     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 No additional pathogenic mutations, variants of unknown significance, or gross deletions or duplications were detected. Genes Analyzed (36 total): APC, JEWELL, BARD1, BMPR1A, BRCA1, BRCA2, BRIP1, CDH1, CDK4, CDKN2A, CHEK2, DICER1, MLH1, MSH2, MSH6, MUTYH, NBN, NF1, NTHL1, PALB2, PMS2, PTEN, RAD51C, RAD51D, RECQL, SMAD4, SMARCA4, STK11 and TP53 (sequencing and deletion/duplication); AXIN2, HOXB13, MSH3, POLD1 and POLE (sequencing only); EPCAM and GREM1 (deletion/duplication only).    A copy of the test report can be found in the Laboratory tab, dated 12/13/2023, and named \"LABORATORY MISCELLANEOUS ORDER\". The report is scanned in as a linked document.    Cancer Risks:    Mutations in the CHEK2 gene are associated with a moderate risk of breast and colon cancer.  We discussed that this I157T mutation confers a lower breast cancer risk than other mutations in the CHEK2 gene.   The lifetime breast cancer risk for women who carry this specific CHEK2 mutation is approximately 1.5x higher than the general population lifetime risk for breast cancer of about 12%. This equates to a lifetime risk of about 18%. Other mutations in the CHEK2 gene cause about a 15-40% lifetime risk for breast cancer.   The risk of colon cancer is approximately 5-10%   There is also a possible association of CHEK2 mutations with increased risk for other cancers, such as prostate, " melanoma, thyroid, and other cancers. However data is still limited in this area. No confirmed risk numbers are available for these additional cancers, though they have been reported in families that have a CHEK2 mutation.    We discussed that CHEK2 gene is currently considered a moderate-risk gene. This means that mutations in this gene increase the risk for certain cancers, but are unlikely to be the single cause for an individual's cancer. There are likely other genetic and/or environmental risk factors that, in combination with a CHEK2 gene mutation, cause cancer.    Cancer Screening and Prevention:  The following screening is recommended for individuals who have a mutation in the CHEK2 gene, per current National Comprehensive Cancer Network (NCCN) guidelines.  Typically, women with CHEK2 mutations qualify for high risk breast screening. However, as this mutation causes a lower risk than other mutations in the CHEK2 gene, NCCN states that patients should be managed per their specific gene mutation. As this mutation is associated with a breast cancer risk of <20%, Modesta would not qualify for high-risk breast screening based on this genetic test result alone.  Modesta's lifetime breast cancer risk was calculated using the IBISv8 model to determine if she qualifies for high-risk screening based on family history. Per this model, Modesta has a 32% lifetime risk of developing breast cancer. Based on this elevated risk, Modesta could consider high risk breast screening (including alternating breast MRI and mammogram screening every 6 months).    NCCN guidelines recommend colon cancer screening in individuals who have a mutation in the CHEK2 gene.   Colonoscopies are done every 5 years, beginning at age 40 (or based on family history of colon cancer).  Modesta should discuss this colon screening with her physicians.   There are currently no other specific cancer screening guidelines for other cancers potentially associated  "with a CHEK2 mutation. As such, additional screening should be based on family history.      We discussed that Modesta could participate in our Cancer Risk Management Program in which our nursing specialist provides an individual screening plan and assists with medical management. Modesta plans to follow up with her primary care provider to discuss breast and colon screening options.      Of note, the above information is based on our current understanding of Modesta's genetic findings. Modesta is encouraged to reach out to me regularly regarding any pertinent updates to her personal and/or family history of cancer, as our understanding of the genetic findings in her family may change over time.     Implications for Family Members:  We reviewed that mutations in the CHEK2 gene are inherited in an autosomal dominant pattern. This means that each of Modesta's children have a 50% chance of inheriting the same mutation. Likewise, each of her siblings has a 50% risk of having the same mutation. Extended relatives may also carry this mutation, and she is encouraged to share this information with her family members on both sides of the family. I am happy to help her relatives connect with a genetic counselor in their area if they would like to discuss testing.    Cancer Screening Summary:  Modesta reports her most recent mammogram was March of 2023 through the [facility] (Care Everywhere not available); based on the IBISv8 model (which takes into account both personal and family history factors), Modesta could consider including annual breast MRI imaging.  She plans to discuss with Dr. Gomez   Modesta's most recent colonoscopy from 2018 noted one 3mm polyp; 5 year follow up is now recommended based on her genetic test results. Modesta is due for a colonoscopy this year.       Plan:  1.  I provided Modesta with a copy of her test results and support resources today.  2.  I will provide a \"Dear Relative\" letter for Modesta to share with her " family members.  3.  She plans to follow up with her primary care provider.    Cora Enriquez MS, [facility] [place]  Licensed, Certified Genetic Counselor

## 2024-01-26 ENCOUNTER — VIRTUAL VISIT (OUTPATIENT)
Dept: ONCOLOGY | Facility: CLINIC | Age: 59
End: 2024-01-26
Attending: GENETIC COUNSELOR, MS
Payer: COMMERCIAL

## 2024-01-26 ENCOUNTER — MYC MEDICAL ADVICE (OUTPATIENT)
Dept: PEDIATRICS | Facility: CLINIC | Age: 59
End: 2024-01-26
Payer: COMMERCIAL

## 2024-01-26 DIAGNOSIS — Z15.89 MONOALLELIC MUTATION OF CHEK2 GENE IN FEMALE PATIENT: ICD-10-CM

## 2024-01-26 DIAGNOSIS — Z80.3 FAMILY HISTORY OF BREAST CANCER IN SISTER: Primary | ICD-10-CM

## 2024-01-26 DIAGNOSIS — Z15.01 MONOALLELIC MUTATION OF CHEK2 GENE IN FEMALE PATIENT: ICD-10-CM

## 2024-01-26 DIAGNOSIS — Z15.09 MONOALLELIC MUTATION OF CHEK2 GENE IN FEMALE PATIENT: ICD-10-CM

## 2024-01-26 DIAGNOSIS — Z15.02 MONOALLELIC MUTATION OF CHEK2 GENE IN FEMALE PATIENT: ICD-10-CM

## 2024-01-26 PROCEDURE — 999N000069 HC STATISTIC GENETIC COUNSELING, < 16 MIN: Mod: GT,95 | Performed by: GENETIC COUNSELOR, MS

## 2024-01-26 NOTE — PATIENT INSTRUCTIONS
"Dear Relative,     The purpose of this letter is to inform you that your relative recently underwent genetic counseling and genetic testing due to the family history of cancer. The testing done through [facility] identified a moderate risk mutation in the CHEK2 gene. Specifically, the mutation is called c.470T>C (I157T). The accession number linked to your relative's test report is [id number].    A mutation (or change in the genetic code) causes a specific gene to stop working properly. Ultimately, individuals who have a mutation in this CHEK2 gene have an increased risk for breast, colon cancer, and other cancers. This particular CHEK2 mutation is considered a  moderate risk  mutation.  This mutation is called \"moderate risk\" because other mutations in the CHEK2 gene cause higher cancer risks than this mutation.       This particular mutation increases the lifetime risk for female breast cancer by about 1.5 fold. The population lifetime risk for breast cancer in women is around 12%.  The lifetime risk for colon cancer for individuals with CHEK2 mutations may be as high as twice that in the general population. Mutations in this gene have also been associated with an increased risk for other cancers; however the exact risks are not well defined at this time.     If individuals are found to have a mutation in the CHEK2 gene, we would discuss increased cancer screening at earlier ages. It is important to note that both men and women have a 50% chance of passing this mutation on to each of their children.    I understand that this may be surprising, unexpected, and even scary news. Because this mutation has been identified in your family, you are at risk for having the same mutation. As mentioned earlier, your children may also be at risk.     Scheduling a genetic counseling appointment does not mean you have to undergo genetic testing. The decision to pursue such testing is a very personal one that is discussed in " more detail during the session. Much of cancer genetic counseling is providing valuable information to individuals who are impacted by genetic information such as this.     If you are interested in scheduling a genetic counseling appointment at [facility], please call [phone] to schedule an appointment. You can also find a genetic counselor close to you or at another health system at [facility]

## 2024-01-26 NOTE — LETTER
"    1/26/2024         RE: Modesta Bravo  [address]  [place] [address]        Dear Colleague,    Thank you for referring your patient, Modesta Bravo, to the [facility]. Please see a copy of my visit note below.    1/26/2024    Virtual Visit Details    Type of service:  Video Visit   Joined the call at 1/26/2024, 10:09:01 am.  Left the call at 1/26/2024, 10:22:24 am.  Originating Location (pt. Location): Home  Distant Location (provider location):  Off-site  Platform used for Video Visit: [facility]    Referring Provider: Kae Gomez MD     Presenting Information:   Modesta returned to the Cancer Risk Management Program (virtual visit) to discuss her genetic testing results.     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 No additional pathogenic mutations, variants of unknown significance, or gross deletions or duplications were detected. Genes Analyzed (36 total): APC, JEWELL, BARD1, BMPR1A, BRCA1, BRCA2, BRIP1, CDH1, CDK4, CDKN2A, CHEK2, DICER1, MLH1, MSH2, MSH6, MUTYH, NBN, NF1, NTHL1, PALB2, PMS2, PTEN, RAD51C, RAD51D, RECQL, SMAD4, SMARCA4, STK11 and TP53 (sequencing and deletion/duplication); AXIN2, HOXB13, MSH3, POLD1 and POLE (sequencing only); EPCAM and GREM1 (deletion/duplication only).    A copy of the test report can be found in the Laboratory tab, dated 12/13/2023, and named \"LABORATORY MISCELLANEOUS ORDER\". The report is scanned in as a linked document.    Cancer Risks:    Mutations in the CHEK2 gene are associated with a moderate risk of breast and colon cancer.  We discussed that this I157T mutation confers a lower breast cancer risk than other mutations in the CHEK2 gene.   The lifetime breast cancer risk for women who carry this specific CHEK2 mutation is approximately 1.5x higher than the general population lifetime risk for breast cancer of about 12%. This equates to a lifetime risk of about " 18%. Other mutations in the CHEK2 gene cause about a 15-40% lifetime risk for breast cancer.   The risk of colon cancer is approximately 5-10%   There is also a possible association of CHEK2 mutations with increased risk for other cancers, such as prostate, melanoma, thyroid, and other cancers. However data is still limited in this area. No confirmed risk numbers are available for these additional cancers, though they have been reported in families that have a CHEK2 mutation.    We discussed that CHEK2 gene is currently considered a moderate-risk gene. This means that mutations in this gene increase the risk for certain cancers, but are unlikely to be the single cause for an individual's cancer. There are likely other genetic and/or environmental risk factors that, in combination with a CHEK2 gene mutation, cause cancer.    Cancer Screening and Prevention:  The following screening is recommended for individuals who have a mutation in the CHEK2 gene, per current National Comprehensive Cancer Network (NCCN) guidelines.  Typically, women with CHEK2 mutations qualify for high risk breast screening. However, as this mutation causes a lower risk than other mutations in the CHEK2 gene, NCCN states that patients should be managed per their specific gene mutation. As this mutation is associated with a breast cancer risk of <20%, Modesta would not qualify for high-risk breast screening based on this genetic test result alone.  Modesta's lifetime breast cancer risk was calculated using the IBISv8 model to determine if she qualifies for high-risk screening based on family history. Per this model, Modesta has a 32% lifetime risk of developing breast cancer. Based on this elevated risk, Modesta could consider high risk breast screening (including alternating breast MRI and mammogram screening every 6 months).    NCCN guidelines recommend colon cancer screening in individuals who have a mutation in the CHEK2 gene.   Colonoscopies are done  every 5 years, beginning at age 40 (or based on family history of colon cancer).  Modesta should discuss this colon screening with her physicians.   There are currently no other specific cancer screening guidelines for other cancers potentially associated with a CHEK2 mutation. As such, additional screening should be based on family history.      We discussed that Modesta could participate in our Cancer Risk Management Program in which our nursing specialist provides an individual screening plan and assists with medical management. Modesta plans to follow up with her primary care provider to discuss breast and colon screening options.      Of note, the above information is based on our current understanding of Modesta's genetic findings. Modesta is encouraged to reach out to me regularly regarding any pertinent updates to her personal and/or family history of cancer, as our understanding of the genetic findings in her family may change over time.     Implications for Family Members:  We reviewed that mutations in the CHEK2 gene are inherited in an autosomal dominant pattern. This means that each of Modesta's children have a 50% chance of inheriting the same mutation. Likewise, each of her siblings has a 50% risk of having the same mutation. Extended relatives may also carry this mutation, and she is encouraged to share this information with her family members on both sides of the family. I am happy to help her relatives connect with a genetic counselor in their area if they would like to discuss testing.    Cancer Screening Summary:  Modesta reports her most recent mammogram was March of 2023 through the [facility] (Care Everywhere not available); based on the IBISv8 model (which takes into account both personal and family history factors), Modesta could consider including annual breast MRI imaging.  She plans to discuss with Dr. Gomez   Modesta's most recent colonoscopy from 2018 noted one 3mm polyp; 5 year follow up is now  "recommended based on her genetic test results. Modesta is due for a colonoscopy this year.       Plan:  1.  I provided Modesta with a copy of her test results and support resources today.  2.  I will provide a \"Dear Relative\" letter for Modesta to share with her family members.  3.  She plans to follow up with her primary care provider.    Cora Enriquez MS, [facility] [place]  Licensed, Certified Genetic Counselor      "

## 2024-01-26 NOTE — NURSING NOTE
Is the patient currently in the state of [place]? YES    Visit mode:VIDEO    If the visit is dropped, the patient can be reconnected by: VIDEO VISIT: Text to cell phone:   Telephone Information:   Mobile [phone]       Will anyone else be joining the visit? NO  (If patient encounters technical issues they should call [phone]    How would you like to obtain your AVS? MyChart    Are changes needed to the allergy or medication list? N/A    Reason for visit: ZULMA BRAY

## 2024-03-17 ENCOUNTER — HEALTH MAINTENANCE LETTER (OUTPATIENT)
Age: 59
End: 2024-03-17

## 2024-05-26 ENCOUNTER — HEALTH MAINTENANCE LETTER (OUTPATIENT)
Age: 59
End: 2024-05-26

## 2024-06-17 ENCOUNTER — MYC MEDICAL ADVICE (OUTPATIENT)
Dept: PEDIATRICS | Facility: CLINIC | Age: 59
End: 2024-06-17

## 2024-06-17 ENCOUNTER — E-VISIT (OUTPATIENT)
Dept: PEDIATRICS | Facility: CLINIC | Age: 59
End: 2024-06-17
Payer: COMMERCIAL

## 2024-06-17 DIAGNOSIS — F41.9 ANXIETY: Primary | ICD-10-CM

## 2024-06-17 DIAGNOSIS — Z12.31 ENCOUNTER FOR SCREENING MAMMOGRAM FOR BREAST CANCER: Primary | ICD-10-CM

## 2024-06-17 PROCEDURE — 99207 PR NON-BILLABLE SERV PER CHARTING: CPT | Performed by: INTERNAL MEDICINE

## 2024-06-17 ASSESSMENT — ANXIETY QUESTIONNAIRES
5. BEING SO RESTLESS THAT IT IS HARD TO SIT STILL: SEVERAL DAYS
7. FEELING AFRAID AS IF SOMETHING AWFUL MIGHT HAPPEN: NOT AT ALL
3. WORRYING TOO MUCH ABOUT DIFFERENT THINGS: MORE THAN HALF THE DAYS
7. FEELING AFRAID AS IF SOMETHING AWFUL MIGHT HAPPEN: NOT AT ALL
6. BECOMING EASILY ANNOYED OR IRRITABLE: MORE THAN HALF THE DAYS
2. NOT BEING ABLE TO STOP OR CONTROL WORRYING: MORE THAN HALF THE DAYS
1. FEELING NERVOUS, ANXIOUS, OR ON EDGE: SEVERAL DAYS
GAD7 TOTAL SCORE: 9
GAD7 TOTAL SCORE: 9
8. IF YOU CHECKED OFF ANY PROBLEMS, HOW DIFFICULT HAVE THESE MADE IT FOR YOU TO DO YOUR WORK, TAKE CARE OF THINGS AT HOME, OR GET ALONG WITH OTHER PEOPLE?: SOMEWHAT DIFFICULT
4. TROUBLE RELAXING: SEVERAL DAYS
IF YOU CHECKED OFF ANY PROBLEMS ON THIS QUESTIONNAIRE, HOW DIFFICULT HAVE THESE PROBLEMS MADE IT FOR YOU TO DO YOUR WORK, TAKE CARE OF THINGS AT HOME, OR GET ALONG WITH OTHER PEOPLE: SOMEWHAT DIFFICULT

## 2024-06-17 NOTE — TELEPHONE ENCOUNTER
Dr Gomez,     Please see patient message.     Per chart review,  -Mammo screening due in caregaps.      Recommended e-visit for anxiety symptoms- consider restarting medication.    Thank you,  Nguyen MATHIS RN on 6/17/2024 at 3:47 PM

## 2024-06-17 NOTE — PATIENT INSTRUCTIONS
Learning About Anxiety Disorders  What are anxiety disorders?     Anxiety disorders are a type of medical problem. They cause severe anxiety. When you feel anxious, you feel that something bad is about to happen. This feeling interferes with your life.  These disorders include:  Generalized anxiety disorder. You feel worried and stressed about many everyday events and activities. This goes on for several months and disrupts your life on most days.  Panic disorder. You have repeated panic attacks. A panic attack is a sudden, intense fear or anxiety. It may make you feel short of breath. Your heart may pound.  Social anxiety disorder. You feel very anxious about what you will say or do in front of people. For example, you may be scared to talk or eat in public. This problem affects your daily life.  Phobias. You are very scared of a specific object, situation, or activity. For example, you may fear spiders, high places, or small spaces.  What are the symptoms?  Generalized anxiety disorder  Symptoms may include:  Feeling worried and stressed about many things almost every day.  Feeling tired or irritable. You may have a hard time concentrating.  Having headaches or muscle aches.  Having a hard time getting to sleep or staying asleep.  Panic disorder  You may have repeated panic attacks when there is no reason for feeling afraid. You may change your daily activities because you worry that you will have another attack.  Symptoms may include:  Intense fear, terror, or anxiety.  Trouble breathing or very fast breathing.  Chest pain or tightness.  A heartbeat that races or is not regular.  Social anxiety disorder  Symptoms may include:  Fear about a social situation, such as eating in front of others or speaking in public. You may worry a lot. Or you may be afraid that something bad will happen.  Anxiety that can cause you to blush, sweat, and feel shaky.  A heartbeat that is faster than normal.  A hard time  "focusing.  Phobias  Symptoms may include:  More fear than most people of being around an object, being in a situation, or doing an activity. You might also be stressed about the chance of being around the thing you fear.  Worry about losing control, panicking, fainting, or having physical symptoms like a faster heartbeat when you are around the situation or object.  How are these disorders treated?  Anxiety disorders can be treated with medicines or counseling. A combination of both may be used.  Medicines may include:  Antidepressants. These may help your symptoms by keeping chemicals in your brain in balance.  Benzodiazepines. These may give you short-term relief of your symptoms.  Some people use cognitive-behavioral therapy. A therapist helps you learn to change stressful or bad thoughts into helpful thoughts.  Lead a healthy lifestyle  A healthy lifestyle may help you feel better.  Get at least 30 minutes of exercise on most days of the week. Walking is a good choice.  Eat a healthy diet. Include fruits, vegetables, lean proteins, and whole grains in your diet each day.  Try to go to bed at the same time every night. Try for 8 hours of sleep a night.  Find ways to manage stress. Try relaxation exercises.  Avoid alcohol and illegal drugs.  Follow-up care is a key part of your treatment and safety. Be sure to make and go to all appointments, and call your doctor if you are having problems. It's also a good idea to know your test results and keep a list of the medicines you take.  Where can you learn more?  Go to https://www.[employer].net/patiented  Enter K667 in the search box to learn more about \"Learning About Anxiety Disorders.\"  Current as of: June 24, 2023               Content Version: 14.0    0626-3289 [employer].   Care instructions adapted under license by your healthcare professional. If you have questions about a medical condition or this instruction, always ask your healthcare " professional. [employer] [facility] disclaims any warranty or liability for your use of this information.      Generalized Anxiety Disorder: Care Instructions  Overview     We all worry. It's a normal part of life. But when you have generalized anxiety disorder, you worry about lots of things. You have a hard time not worrying. This worry or anxiety interferes with your relationships, work or school, and other areas of your life.  You may worry most days about things like money, health, work, or friends. That may make you feel tired, tense, or cranky. It can make it hard to think. It may get in the way of healthy sleep.  Counseling and medicine can both work to treat anxiety. They are often used together with lifestyle changes, such as getting enough sleep. Treatment can include a type of counseling called cognitive behavioral therapy, or CBT. It helps you notice and replace thoughts that make you worry. You also might have counseling along with those closest to you so that they can help.  Follow-up care is a key part of your treatment and safety. Be sure to make and go to all appointments, and call your doctor if you are having problems. It's also a good idea to know your test results and keep a list of the medicines you take.  How can you care for yourself at home?  Get at least 30 minutes of exercise on most days of the week. Walking is a good choice. You also may want to do other activities, such as running, swimming, cycling, or playing tennis or team sports.  Learn and do relaxation exercises, such as deep breathing.  Go to bed at the same time every night. Try for 8 to 10 hours of sleep a night.  Avoid alcohol, marijuana, and illegal drugs.  Find a counselor who uses cognitive behavioral therapy (CBT).  Don't isolate yourself. Let those closest to you help you. Find someone you can trust and confide in. Talk to that person.  Be safe with medicines. Take your medicines exactly as prescribed. Call your  "doctor if you think you are having a problem with your medicine.  Practice healthy thinking. How you think can affect how you feel and act. Ask yourself if your thoughts are helpful or unhelpful. If they are unhelpful, you can learn how to change them.  Recognize and accept your anxiety. When you feel anxious, say to yourself, \"This is not an emergency. I feel uncomfortable, but I am not in danger. I can keep going even if I feel anxious.\"  When should you call for help?   Call 911  anytime you think you may need emergency care. For example, call if:    You feel you can't stop from hurting yourself or someone else.   Where to get help 24 hours a day, 7 days a week   If you or someone you know talks about suicide, self-harm, a mental health crisis, a substance use crisis, or any other kind of emotional distress, get help right away. You can:    Call the Suicide and Crisis Lifeline at 667.     Call 9-092-972-ONCU (1-761.174.6862).     Text HOME to 018429 to access the Crisis Text Line.   Consider saving these numbers in your phone.  Go to [employer] for more information or to chat online.  Call your doctor or counselor now or seek immediate medical care if:    You have new anxiety, or your anxiety gets worse.     You have been feeling sad, depressed, or hopeless or have lost interest in things that you usually enjoy.     You do not get better as expected.   Where can you learn more?  Go to https://www.[employer].net/patiented  Enter G123 in the search box to learn more about \"Generalized Anxiety Disorder: Care Instructions.\"  Current as of: June 24, 2023               Content Version: 14.0    8709-7315 [employer].   Care instructions adapted under license by your healthcare professional. If you have questions about a medical condition or this instruction, always ask your healthcare professional. [employer] disclaims any warranty or liability for your use of this information.      "

## 2024-06-19 ENCOUNTER — VIRTUAL VISIT (OUTPATIENT)
Dept: PEDIATRICS | Facility: CLINIC | Age: 59
End: 2024-06-19
Payer: COMMERCIAL

## 2024-06-19 DIAGNOSIS — F41.1 GAD (GENERALIZED ANXIETY DISORDER): Primary | ICD-10-CM

## 2024-06-19 DIAGNOSIS — E21.3 HYPERPARATHYROIDISM (H): ICD-10-CM

## 2024-06-19 DIAGNOSIS — E66.09 CLASS 1 OBESITY DUE TO EXCESS CALORIES WITHOUT SERIOUS COMORBIDITY WITH BODY MASS INDEX (BMI) OF 33.0 TO 33.9 IN ADULT: ICD-10-CM

## 2024-06-19 DIAGNOSIS — E78.00 HYPERCHOLESTEROLEMIA: ICD-10-CM

## 2024-06-19 DIAGNOSIS — K76.0 NAFLD (NONALCOHOLIC FATTY LIVER DISEASE): ICD-10-CM

## 2024-06-19 DIAGNOSIS — E66.811 CLASS 1 OBESITY DUE TO EXCESS CALORIES WITHOUT SERIOUS COMORBIDITY WITH BODY MASS INDEX (BMI) OF 33.0 TO 33.9 IN ADULT: ICD-10-CM

## 2024-06-19 DIAGNOSIS — F33.0 MILD EPISODE OF RECURRENT MAJOR DEPRESSIVE DISORDER (H): ICD-10-CM

## 2024-06-19 PROCEDURE — 99214 OFFICE O/P EST MOD 30 MIN: CPT | Mod: 95 | Performed by: INTERNAL MEDICINE

## 2024-06-19 RX ORDER — LORATADINE 10 MG/1
10 TABLET ORAL DAILY
COMMUNITY

## 2024-06-19 RX ORDER — ASPIRIN 81 MG/1
81 TABLET ORAL DAILY
COMMUNITY
End: 2024-09-23

## 2024-06-19 RX ORDER — CHOLECALCIFEROL (VITAMIN D3) 50 MCG
1 TABLET ORAL DAILY
COMMUNITY

## 2024-06-19 ASSESSMENT — ANXIETY QUESTIONNAIRES
8. IF YOU CHECKED OFF ANY PROBLEMS, HOW DIFFICULT HAVE THESE MADE IT FOR YOU TO DO YOUR WORK, TAKE CARE OF THINGS AT HOME, OR GET ALONG WITH OTHER PEOPLE?: SOMEWHAT DIFFICULT
7. FEELING AFRAID AS IF SOMETHING AWFUL MIGHT HAPPEN: NOT AT ALL
3. WORRYING TOO MUCH ABOUT DIFFERENT THINGS: MORE THAN HALF THE DAYS
2. NOT BEING ABLE TO STOP OR CONTROL WORRYING: MORE THAN HALF THE DAYS
7. FEELING AFRAID AS IF SOMETHING AWFUL MIGHT HAPPEN: NOT AT ALL
6. BECOMING EASILY ANNOYED OR IRRITABLE: MORE THAN HALF THE DAYS
1. FEELING NERVOUS, ANXIOUS, OR ON EDGE: MORE THAN HALF THE DAYS
IF YOU CHECKED OFF ANY PROBLEMS ON THIS QUESTIONNAIRE, HOW DIFFICULT HAVE THESE PROBLEMS MADE IT FOR YOU TO DO YOUR WORK, TAKE CARE OF THINGS AT HOME, OR GET ALONG WITH OTHER PEOPLE: SOMEWHAT DIFFICULT
GAD7 TOTAL SCORE: 11
4. TROUBLE RELAXING: MORE THAN HALF THE DAYS
5. BEING SO RESTLESS THAT IT IS HARD TO SIT STILL: SEVERAL DAYS
GAD7 TOTAL SCORE: 11

## 2024-06-19 ASSESSMENT — PATIENT HEALTH QUESTIONNAIRE - PHQ9: SUM OF ALL RESPONSES TO PHQ QUESTIONS 1-9: 5

## 2024-06-19 NOTE — PATIENT INSTRUCTIONS
Modesta    It was great to see you today at your virtual visit. Here is a summary of our plan:    Start fluoxetine 20 mg once daily.    If you would like to retry wellbutrin to help with side effects, let me know and we can start a low dose (150mg)  and try it.    Let me know how you are doing in 6 weeks or so and at any point if you want to go back to sertraline.     Take care and let me know if you have any questions.    Warm Regards,  Kae Gomez M.D.

## 2024-06-19 NOTE — PROGRESS NOTES
"Modesta is a 58 year old who is being evaluated via a billable video visit.    How would you like to obtain your AVS? MyChart  If the video visit is dropped, the invitation should be resent by: Send to e-mail at: [email or web address]  Will anyone else be joining your video visit? No      Assessment & Plan     SARAI (generalized anxiety disorder)  Discussed options. While sertraline didn't seem to lose it's effectiveness even after 20 years, will try fluoxetine for side effect profile and more weight neutrality. See patient instructions section.    - FLUoxetine (PROZAC) 20 MG capsule; Take 1 capsule (20 mg) by mouth daily    Mild episode of recurrent major depressive disorder (H24)  See patient instructions section.    Class 1 obesity due to excess calories without serious comorbidity with body mass index (BMI) of 33.0 to 33.9 in adult  Had previously had semaglutide denied on PA. Will re-try with new year. Also discussed option for compounded form  - Semaglutide-Weight Management (WEGOVY) 0.25 MG/0.5ML pen; Inject 0.25 mg Subcutaneous once a week    NAFLD (nonalcoholic fatty liver disease)  Reviewed with her. This has been evaluated at [facility] in 2020 with extensive workup. Were able to get ABD CT results from [facility] after last visit. Did show fatty liver with hepatomegaly. Discussed option to do ultrasound to reassess liver size, etc. Will discuss further at next preventive.     Hyperparathyroidism (H24)  Had been followed at [facility]. Is due for repeat lab. Visit virtual today.           BMI  Estimated body mass index is 36.66 kg/m  as calculated from the following:    Height as of 1/19/24: 1.685 m (5' 6.34\").    Weight as of 1/19/24: 104.1 kg (229 lb 8 oz).   Weight management plan: Discussed healthy diet and exercise guidelines      See Patient Instructions    Subjective   Modesta is a 58 year old, presenting for the following health issues:  RECHECK        6/19/2024     2:38 PM   Additional Questions   Roomed by GRETCHEN Harvey "   Accompanied by no         6/19/2024     2:38 PM   Patient Reported Additional Medications   Patient reports taking the following new medications no     Via the Health Maintenance questionnaire, the patient has reported the following services have been completed -Mammogram: [facility] 2022-02-15, this information has been sent to the abstraction team.  History of Present Illness       Mental Health Follow-up:  Patient presents to follow-up on Anxiety.    Patient's anxiety since last visit has been:  Worse  The patient is not having other symptoms associated with anxiety.  Any significant life events: No  Patient is feeling anxious or having panic attacks.  Patient has no concerns about alcohol or drug use.She consumes 0 sweetened beverage(s) daily.She exercises with enough effort to increase her heart rate 10 to 19 minutes per day.  She exercises with enough effort to increase her heart rate 3 or less days per week.   She is taking medications regularly.     Started medication around age 30. First was Lexapro. Remembers she felt involuntary jerks of muscles. Then switched to paxil. Took for several years. Gained 30 lbs and went through terrible withdrawals. Then sertraline. On that for a good 15 years. Then added wellbutrin, her ob/gyn. Thinks was 500 mg. Then back to sertraline. Tried to go off and felt it was going pretty well, but now feeling more on edge. Doesn't want to do things she would normally want to do. Feels like everyone is talking her all the time. Almost like a social phobia.  Doesn't have stress.     Didn't end up starting semaglutide. Not covered. Weight gain started in her 30's after having kids and going on paxil. Hasn't ever been able to take that weight off. Has tried multiple times to lose but not successful.        Review of Systems  Constitutional, HEENT, cardiovascular, pulmonary, gi and gu systems are negative, except as otherwise noted.      Objective    Vitals - Patient  "Reported  Weight (Patient Reported): 88.5 kg (195 lb)  Height (Patient Reported): 171.5 cm (5' 7.5\")  BMI (Based on Pt Reported Ht/Wt): 30.09  Pain Score: No Pain (0)        Physical Exam   GENERAL: alert and no distress  EYES: Eyes grossly normal to inspection.  No discharge or erythema, or obvious scleral/conjunctival abnormalities.  RESP: No audible wheeze, cough, or visible cyanosis.    SKIN: Visible skin clear. No significant rash, abnormal pigmentation or lesions.  NEURO: Cranial nerves grossly intact.  Mentation and speech appropriate for age.  PSYCH: Appropriate affect, tone, and pace of words        Video-Visit Details    Type of service:  Video Visit   Originating Location (pt. Location): Home    Distant Location (provider location):  On-site  Platform used for Video Visit: Yobani  Signed Electronically by: Kae Gomez MD    "

## 2024-06-21 ENCOUNTER — TELEPHONE (OUTPATIENT)
Dept: PEDIATRICS | Facility: CLINIC | Age: 59
End: 2024-06-21
Payer: COMMERCIAL

## 2024-06-21 NOTE — TELEPHONE ENCOUNTER
Prior Authorization Retail Medication Request    Medication/Dose: Semaglutide-Weight Management (WEGOVY) 0.25 MG/0.5ML pen  0.25 mg, WEEKLY 0 ordered      Summary:  Inject 0.25 mg Subcutaneous once a week, Disp-2 mL      Insurance   Primary:   BCBS BCBS OUT OF STATE     Insurance ID:  [id number]     Pharmacy Information (if different than what is on RX)  Name:     [id number] - [place], [place] - [address] Dx Associated: Taking: Long-term: Med Note:    Summary:  Inject 0.25 mg Subcutaneous once a week, Disp-2 mL     Phone:  [phone]   Fax:    [phone]

## 2024-08-04 ENCOUNTER — HEALTH MAINTENANCE LETTER (OUTPATIENT)
Age: 59
End: 2024-08-04

## 2024-08-14 ENCOUNTER — HOSPITAL ENCOUNTER (OUTPATIENT)
Dept: MAMMOGRAPHY | Facility: CLINIC | Age: 59
Discharge: HOME | End: 2024-08-14
Attending: INTERNAL MEDICINE | Admitting: INTERNAL MEDICINE
Payer: COMMERCIAL

## 2024-08-14 DIAGNOSIS — Z12.31 ENCOUNTER FOR SCREENING MAMMOGRAM FOR BREAST CANCER: ICD-10-CM

## 2024-08-14 PROCEDURE — 77063 BREAST TOMOSYNTHESIS BI: CPT

## 2024-09-17 DIAGNOSIS — F41.1 GAD (GENERALIZED ANXIETY DISORDER): ICD-10-CM

## 2024-09-18 ASSESSMENT — ANXIETY QUESTIONNAIRES
1. FEELING NERVOUS, ANXIOUS, OR ON EDGE: SEVERAL DAYS
GAD7 TOTAL SCORE: 5
5. BEING SO RESTLESS THAT IT IS HARD TO SIT STILL: NOT AT ALL
IF YOU CHECKED OFF ANY PROBLEMS ON THIS QUESTIONNAIRE, HOW DIFFICULT HAVE THESE PROBLEMS MADE IT FOR YOU TO DO YOUR WORK, TAKE CARE OF THINGS AT HOME, OR GET ALONG WITH OTHER PEOPLE: NOT DIFFICULT AT ALL
2. NOT BEING ABLE TO STOP OR CONTROL WORRYING: SEVERAL DAYS
3. WORRYING TOO MUCH ABOUT DIFFERENT THINGS: SEVERAL DAYS
6. BECOMING EASILY ANNOYED OR IRRITABLE: SEVERAL DAYS
7. FEELING AFRAID AS IF SOMETHING AWFUL MIGHT HAPPEN: NOT AT ALL

## 2024-09-18 ASSESSMENT — PATIENT HEALTH QUESTIONNAIRE - PHQ9: 5. POOR APPETITE OR OVEREATING: SEVERAL DAYS

## 2024-09-23 ENCOUNTER — OFFICE VISIT (OUTPATIENT)
Dept: PEDIATRICS | Facility: CLINIC | Age: 59
End: 2024-09-23
Payer: COMMERCIAL

## 2024-09-23 VITALS
DIASTOLIC BLOOD PRESSURE: 76 MMHG | SYSTOLIC BLOOD PRESSURE: 128 MMHG | RESPIRATION RATE: 14 BRPM | TEMPERATURE: 97.5 F | BODY MASS INDEX: 35.12 KG/M2 | HEART RATE: 77 BPM | OXYGEN SATURATION: 98 % | HEIGHT: 67 IN | WEIGHT: 223.8 LBS

## 2024-09-23 DIAGNOSIS — Z23 VACCINE FOR VIRAL HEPATITIS: ICD-10-CM

## 2024-09-23 DIAGNOSIS — Z12.4 CERVICAL CANCER SCREENING: ICD-10-CM

## 2024-09-23 DIAGNOSIS — Z15.01 MONOALLELIC MUTATION OF CHEK2 GENE IN FEMALE PATIENT: ICD-10-CM

## 2024-09-23 DIAGNOSIS — Z11.59 NEED FOR HEPATITIS C SCREENING TEST: ICD-10-CM

## 2024-09-23 DIAGNOSIS — E78.00 HYPERCHOLESTEROLEMIA: ICD-10-CM

## 2024-09-23 DIAGNOSIS — Z12.11 COLON CANCER SCREENING: ICD-10-CM

## 2024-09-23 DIAGNOSIS — G47.33 OSA (OBSTRUCTIVE SLEEP APNEA): ICD-10-CM

## 2024-09-23 DIAGNOSIS — N95.2 ATROPHIC VAGINITIS: ICD-10-CM

## 2024-09-23 DIAGNOSIS — Z00.00 ROUTINE GENERAL MEDICAL EXAMINATION AT A HEALTH CARE FACILITY: Primary | ICD-10-CM

## 2024-09-23 DIAGNOSIS — Z15.89 MONOALLELIC MUTATION OF CHEK2 GENE IN FEMALE PATIENT: ICD-10-CM

## 2024-09-23 DIAGNOSIS — I10 ESSENTIAL HYPERTENSION WITH GOAL BLOOD PRESSURE LESS THAN 140/90: ICD-10-CM

## 2024-09-23 DIAGNOSIS — Z12.83 SKIN CANCER SCREENING: ICD-10-CM

## 2024-09-23 DIAGNOSIS — E66.812 CLASS 2 SEVERE OBESITY DUE TO EXCESS CALORIES WITH SERIOUS COMORBIDITY AND BODY MASS INDEX (BMI) OF 35.0 TO 35.9 IN ADULT (H): ICD-10-CM

## 2024-09-23 DIAGNOSIS — F41.1 GAD (GENERALIZED ANXIETY DISORDER): ICD-10-CM

## 2024-09-23 DIAGNOSIS — K76.0 NAFLD (NONALCOHOLIC FATTY LIVER DISEASE): ICD-10-CM

## 2024-09-23 DIAGNOSIS — E83.52 HYPERCALCEMIA: ICD-10-CM

## 2024-09-23 DIAGNOSIS — Z15.02 MONOALLELIC MUTATION OF CHEK2 GENE IN FEMALE PATIENT: ICD-10-CM

## 2024-09-23 DIAGNOSIS — Z80.3 FAMILY HISTORY OF BREAST CANCER IN SISTER: ICD-10-CM

## 2024-09-23 DIAGNOSIS — E66.01 CLASS 2 SEVERE OBESITY DUE TO EXCESS CALORIES WITH SERIOUS COMORBIDITY AND BODY MASS INDEX (BMI) OF 35.0 TO 35.9 IN ADULT (H): ICD-10-CM

## 2024-09-23 DIAGNOSIS — B00.1 COLD SORE: ICD-10-CM

## 2024-09-23 DIAGNOSIS — Z15.09 MONOALLELIC MUTATION OF CHEK2 GENE IN FEMALE PATIENT: ICD-10-CM

## 2024-09-23 LAB
ALBUMIN SERPL BCG-MCNC: 4.6 G/DL (ref 3.5–5.2)
ALP SERPL-CCNC: 93 U/L (ref 40–150)
ALT SERPL W P-5'-P-CCNC: 100 U/L (ref 0–50)
ANION GAP SERPL CALCULATED.3IONS-SCNC: 12 MMOL/L (ref 7–15)
AST SERPL W P-5'-P-CCNC: 82 U/L (ref 0–45)
BILIRUB SERPL-MCNC: 0.7 MG/DL
BUN SERPL-MCNC: 15 MG/DL (ref 6–20)
CALCIUM SERPL-MCNC: 10.6 MG/DL (ref 8.8–10.4)
CHLORIDE SERPL-SCNC: 100 MMOL/L (ref 98–107)
CHOLEST SERPL-MCNC: 198 MG/DL
CREAT SERPL-MCNC: 0.74 MG/DL (ref 0.51–0.95)
EGFRCR SERPLBLD CKD-EPI 2021: >90 ML/MIN/1.73M2
ERYTHROCYTE [DISTWIDTH] IN BLOOD BY AUTOMATED COUNT: 13.1 % (ref 10–15)
FASTING STATUS PATIENT QL REPORTED: YES
FASTING STATUS PATIENT QL REPORTED: YES
GLUCOSE SERPL-MCNC: 111 MG/DL (ref 70–99)
HCO3 SERPL-SCNC: 26 MMOL/L (ref 22–29)
HCT VFR BLD AUTO: 43.2 % (ref 35–47)
HCV AB SERPL QL IA: NONREACTIVE
HDLC SERPL-MCNC: 66 MG/DL
HGB BLD-MCNC: 13.8 G/DL (ref 11.7–15.7)
HPV HR 12 DNA CVX QL NAA+PROBE: NEGATIVE
HPV16 DNA CVX QL NAA+PROBE: NEGATIVE
HPV18 DNA CVX QL NAA+PROBE: NEGATIVE
HUMAN PAPILLOMA VIRUS FINAL DIAGNOSIS: NORMAL
LDLC SERPL CALC-MCNC: 108 MG/DL
MCH RBC QN AUTO: 31.9 PG (ref 26.5–33)
MCHC RBC AUTO-ENTMCNC: 31.9 G/DL (ref 31.5–36.5)
MCV RBC AUTO: 100 FL (ref 78–100)
NONHDLC SERPL-MCNC: 132 MG/DL
PLATELET # BLD AUTO: 231 10E3/UL (ref 150–450)
POTASSIUM SERPL-SCNC: 4.9 MMOL/L (ref 3.4–5.3)
PROT SERPL-MCNC: 8.3 G/DL (ref 6.4–8.3)
RBC # BLD AUTO: 4.33 10E6/UL (ref 3.8–5.2)
SODIUM SERPL-SCNC: 138 MMOL/L (ref 135–145)
TRIGL SERPL-MCNC: 119 MG/DL
VIT B12 SERPL-MCNC: 360 PG/ML (ref 232–1245)
VIT D+METAB SERPL-MCNC: 46 NG/ML (ref 20–50)
WBC # BLD AUTO: 6.7 10E3/UL (ref 4–11)

## 2024-09-23 PROCEDURE — 86803 HEPATITIS C AB TEST: CPT | Performed by: INTERNAL MEDICINE

## 2024-09-23 PROCEDURE — 85027 COMPLETE CBC AUTOMATED: CPT | Performed by: INTERNAL MEDICINE

## 2024-09-23 PROCEDURE — 36415 COLL VENOUS BLD VENIPUNCTURE: CPT | Performed by: INTERNAL MEDICINE

## 2024-09-23 PROCEDURE — 82607 VITAMIN B-12: CPT | Performed by: INTERNAL MEDICINE

## 2024-09-23 PROCEDURE — 99214 OFFICE O/P EST MOD 30 MIN: CPT | Mod: 25 | Performed by: INTERNAL MEDICINE

## 2024-09-23 PROCEDURE — 87624 HPV HI-RISK TYP POOLED RSLT: CPT | Performed by: INTERNAL MEDICINE

## 2024-09-23 PROCEDURE — 90746 HEPB VACCINE 3 DOSE ADULT IM: CPT | Performed by: INTERNAL MEDICINE

## 2024-09-23 PROCEDURE — 80061 LIPID PANEL: CPT | Performed by: INTERNAL MEDICINE

## 2024-09-23 PROCEDURE — 82306 VITAMIN D 25 HYDROXY: CPT | Performed by: INTERNAL MEDICINE

## 2024-09-23 PROCEDURE — G0145 SCR C/V CYTO,THINLAYER,RESCR: HCPCS | Performed by: INTERNAL MEDICINE

## 2024-09-23 PROCEDURE — 90472 IMMUNIZATION ADMIN EACH ADD: CPT | Performed by: INTERNAL MEDICINE

## 2024-09-23 PROCEDURE — 90471 IMMUNIZATION ADMIN: CPT | Performed by: INTERNAL MEDICINE

## 2024-09-23 PROCEDURE — 90673 RIV3 VACCINE NO PRESERV IM: CPT | Performed by: INTERNAL MEDICINE

## 2024-09-23 PROCEDURE — 80053 COMPREHEN METABOLIC PANEL: CPT | Performed by: INTERNAL MEDICINE

## 2024-09-23 PROCEDURE — 99396 PREV VISIT EST AGE 40-64: CPT | Mod: 25 | Performed by: INTERNAL MEDICINE

## 2024-09-23 RX ORDER — LISINOPRIL 20 MG/1
20 TABLET ORAL DAILY
Qty: 90 TABLET | Refills: 4 | Status: SHIPPED | OUTPATIENT
Start: 2024-09-23

## 2024-09-23 RX ORDER — FLUOXETINE 10 MG/1
10 CAPSULE ORAL DAILY
Qty: 90 CAPSULE | Refills: 1 | Status: SHIPPED | OUTPATIENT
Start: 2024-09-23 | End: 2024-09-23

## 2024-09-23 RX ORDER — ESTRADIOL 0.1 MG/G
2 CREAM VAGINAL
Qty: 42.5 G | Refills: 11 | Status: SHIPPED | OUTPATIENT
Start: 2024-09-23

## 2024-09-23 RX ORDER — BUPROPION HYDROCHLORIDE 150 MG/1
150 TABLET ORAL EVERY MORNING
Qty: 30 TABLET | Refills: 3 | Status: SHIPPED | OUTPATIENT
Start: 2024-09-23

## 2024-09-23 RX ORDER — ATORVASTATIN CALCIUM 20 MG/1
TABLET, FILM COATED ORAL
Qty: 90 TABLET | Refills: 4 | Status: SHIPPED | OUTPATIENT
Start: 2024-09-23

## 2024-09-23 ASSESSMENT — ANXIETY QUESTIONNAIRES: GAD7 TOTAL SCORE: 5

## 2024-09-23 NOTE — NURSING NOTE
Pap labeled with clinical patient label by Kae Gomez MD during collection. Lab label verified with identical patient identifiers and affixed to pap specimen. Transported to lab at  11:03 AM       Sruthi Herman MA on 9/23/2024 at 11:04 AM

## 2024-09-23 NOTE — PATIENT INSTRUCTIONS
Add wellbutrin 150 mg once daily.    Please call [facility] at [phone] to set up an appointment.   [facility], [place], [place]    Dr. Florina Weaver at [facility] in [place] ([phone]).    Sleep clinic will call you.        Patient Education   Preventive Care Advice   This is general advice given by our system to help you stay healthy. However, your care team may have specific advice just for you. Please talk to your care team about your preventive care needs.  Nutrition  Eat 5 or more servings of fruits and vegetables each day.  Try wheat bread, brown rice and whole grain pasta (instead of white bread, rice, and pasta).  Get enough calcium and vitamin D. Check the label on foods and aim for 100% of the RDA (recommended daily allowance).  Lifestyle  Exercise at least 150 minutes each week  (30 minutes a day, 5 days a week).  Do muscle strengthening activities 2 days a week. These help control your weight and prevent disease.  No smoking.  Wear sunscreen to prevent skin cancer.  Have a dental exam and cleaning every 6 months.  Yearly exams  See your health care team every year to talk about:  Any changes in your health.  Any medicines your care team has prescribed.  Preventive care, family planning, and ways to prevent chronic diseases.  Shots (vaccines)   HPV shots (up to age 26), if you've never had them before.  Hepatitis B shots (up to age 59), if you've never had them before.  COVID-19 shot: Get this shot when it's due.  Flu shot: Get a flu shot every year.  Tetanus shot: Get a tetanus shot every 10 years.  Pneumococcal, hepatitis A, and RSV shots: Ask your care team if you need these based on your risk.  Shingles shot (for age 50 and up)  General health tests  Diabetes screening:  Starting at age 35, Get screened for diabetes at least every 3 years.  If you are younger than age 35, ask your care team if you should be screened for diabetes.  Cholesterol test:  At age 39, start having a cholesterol test every 5 years, or more often if advised.  Bone density scan (DEXA): At age 50, ask your care team if you should have this scan for osteoporosis (brittle bones).  Hepatitis C: Get tested at least once in your life.  STIs (sexually transmitted infections)  Before age 24: Ask your care team if you should be screened for STIs.  After age 24: Get screened for STIs if you're at risk. You are at risk for STIs (including HIV) if:  You are sexually active with more than one person.  You don't use condoms every time.  You or a partner was diagnosed with a sexually transmitted infection.  If you are at risk for HIV, ask about PrEP medicine to prevent HIV.  Get tested for HIV at least once in your life, whether you are at risk for HIV or not.  Cancer screening tests  Cervical cancer screening: If you have a cervix, begin getting regular cervical cancer screening tests starting at age 21.  Breast cancer scan (mammogram): If you've ever had breasts, begin having regular mammograms starting at age 40. This is a scan to check for breast cancer.  Colon cancer screening: It is important to start screening for colon cancer at age 45.  Have a colonoscopy test every 10 years (or more often if you're at risk) Or, ask your provider about stool tests like a FIT test every year or Cologuard test every 3 years.  To learn more about your testing options, visit:   .  For help making a decision, visit:   https://bit.ly/fz14874.  Prostate cancer screening test: If you have a prostate, ask your care team if a prostate cancer screening test (PSA) at age 55 is right for you.  Lung cancer screening: If you are a current or former smoker ages 50 to 80, ask your care team if ongoing lung cancer screenings are right for you.  For informational purposes only. Not to replace the advice of your health care provider. Copyright   2023 [employer]. All rights reserved. Clinically reviewed by the [facility]. [employer] 470312 - REV 01/24.

## 2024-09-25 LAB
BKR AP ASSOCIATED HPV REPORT: NORMAL
BKR LAB AP GYN ADEQUACY: NORMAL
BKR LAB AP GYN INTERPRETATION: NORMAL
BKR LAB AP PREVIOUS ABNORMAL: NORMAL
PATH REPORT.COMMENTS IMP SPEC: NORMAL
PATH REPORT.COMMENTS IMP SPEC: NORMAL
PATH REPORT.RELEVANT HX SPEC: NORMAL

## 2024-09-26 ENCOUNTER — OFFICE VISIT (OUTPATIENT)
Dept: SLEEP MEDICINE | Facility: CLINIC | Age: 59
End: 2024-09-26
Attending: INTERNAL MEDICINE
Payer: COMMERCIAL

## 2024-09-26 VITALS
DIASTOLIC BLOOD PRESSURE: 84 MMHG | HEART RATE: 92 BPM | BODY MASS INDEX: 35 KG/M2 | WEIGHT: 223 LBS | SYSTOLIC BLOOD PRESSURE: 138 MMHG | OXYGEN SATURATION: 95 % | HEIGHT: 67 IN

## 2024-09-26 DIAGNOSIS — G47.21 DELAYED SLEEP PHASE SYNDROME: ICD-10-CM

## 2024-09-26 DIAGNOSIS — G47.33 OSA (OBSTRUCTIVE SLEEP APNEA): Primary | ICD-10-CM

## 2024-09-26 PROCEDURE — 99244 OFF/OP CNSLTJ NEW/EST MOD 40: CPT | Performed by: PHYSICIAN ASSISTANT

## 2024-09-26 NOTE — NURSING NOTE
"Chief Complaint   Patient presents with    Sleep Study     Establish care, ROXANN       Initial /84   Pulse 92   Ht 1.689 m (5' 6.5\")   Wt 101.2 kg (223 lb)   LMP  (LMP Unknown)   SpO2 95%   BMI 35.45 kg/m   Estimated body mass index is 35.45 kg/m  as calculated from the following:    Height as of this encounter: 1.689 m (5' 6.5\").    Weight as of this encounter: 101.2 kg (223 lb).    Medication Reconciliation: complete    Neck circumference: 17 inches / 43 centimeters.    DME: Transferring to [facility]  Tish Brush MA    "

## 2024-09-26 NOTE — PATIENT INSTRUCTIONS
Instructions for treating Delayed Sleep Phase Syndrome:    Delayed Sleep Phase Syndrome (DSPS) means that your body's internal timing is set late compared to the 24 hour day. Therefore, it is often difficult to get up on time for work in the morning and sometimes difficult to fall asleep on time, in order to get enough sleep. People with DSPS often tend to like to stay up late on weekends and sleep in until between 10 AM and noon, sometimes even later.  This is actually a bad habit that will perpetuate the problem. It reinforces your body's tendency to be on that later schedule. Keeping the same wake time on all days (or as close as possible) will help your body maintain any advancement you make to your circadian rhythms.    You should go to bed when you are sleepy and ready to sleep. During this entire process, you should not engage in activities that may make it worse, such as watching TV in bed, leaving the TV on all night, drinking any caffeine 6 hours before bed or exercising 1-2 hours before bed.     Start taking Melatonin, 0.5-1 mg tablet 3-5 hours before the time that you fall asleep on average (not your desired bedtime or time that you get in bed, but the time you normally fall asleep on your own).     Upon awakening, get exposure to sun-light for about 30-45 minutes. You do not need to look at the sun, in fact, this is dangerous. Reading the paper with the sun shining on you is adequate.  Alternatively, you may use a Seasonal Affective Disorder Lamp (intensity 10,000 Lux) instead of the sun. The lamp should be positioned 1-2 arms lengths away from you. They lamps are sold at Home [employer] Companies such as [employer] or [employer]. A prescription can be written to get insurance coverage in some cases. They are also sold on [employer]. Consider the [place] Happy Light Lucent.    Using the light and melatonin should help march your internal clock (known as your circadian rhythms) gradually  earlier. As your bedtime advances, remember to take your melatonin earlier, keeping it 5 hours before your fall asleep time.    Avoid naps and sleeping in because sleeping during the day will delay your body's clock and you will have to start from scratch.     More information about light therapy:  If the cost of any light box is too much, you can also purchase a compact fluorescent all spectrum light bulb at a local hardware store for about $8.  The most commonly available bulb is 1400 lumen.  You would need two of these positioned within 1 meter of yourself to be equivalent to 2,500 lux.  The bulbs can be placed in a standard light fixture.  Additionally, they can be placed in a mountable fixture that is used in greenhouses.  Mountable fixtures are also available at hardware stores for about $9.  Do not look directly at the light.  If you have any concerns regarding the safety of bright light therapy for you, it is recommended that you consult an ophthalmologist before using a light box.  If you have a condition that makes your eyes very sensitive to light, macular degeneration, a family history of such problems, or diabetic changes to your eyes, consult an ophthalmologist before using a light box. If you have anxiety disorder and have an increase in anxiety discontinue use.

## 2024-09-26 NOTE — PROGRESS NOTES
Outpatient Sleep Medicine Consultation:      Name: Modesta Bravo MRN# [id number]   Age: 58 year old YOB: 1965     Date of Consultation: September 26, 2024  Consultation is requested by: Kae Gomez MD  [address] [place] [address] Kae Gomez  Primary care provider: Kae Gomez       Reason for Sleep Consult:     Modesta Bravo is sent by Kae Gomez for a sleep consultation regarding ROXANN.    Patient s Reason for visit  Modesta Bravo main reason for visit: Switching from [facility]  Patient states problem(s) started: 11/20210  Modesta Bravo's goals for this visit: refill prescription/supplies           Assessment and Plan:     Summary Sleep Diagnoses and Recommendations:  (G47.33) ROXANN (obstructive sleep apnea)  (primary encounter diagnosis)  Comment: Modesta presents to establish care for severe ROXANN diagnosed at [facility] in 2020.  She has been using CPAP nightly and benefits significantly from use.  She drools in her mask. She does not use the water chamber.  She uses a bite guard for bruxism.  Her download shows well-controlled apnea.  She denies concerns about the CPAP, but does have other concerns about her sleep (see below). Her weight is up 9 pounds since her sleep study.  She has on average 1 glass of wine every night.  Plan: Comprehensive DME        Continue auto CPAP 5-15 cm. A prescription was written for new supplies. We reviewed recommendations for cleaning and replacing supplies.  She was shown CPAP strap wraps. We discussed use of the CPAP sterilizer, making sure she still rinses supplies. She was shown the CPAP end cap.  We discussed the effect of alcohol on sleep.      (G47.21) Delayed sleep phase syndrome  Comment: Modesta reports tossing and turning at the beginning of the night as well as in the middle of the night after awakenings.  Her download shows an average of over 9 hours of use per night.  She is trying to go to bed at 10 PM,  when her  goes to bed, even though she is not tired yet.  She then sleeps in a couple of hours after he gets out of bed.  Plan: She was encouraged to limit her time in bed to 8 hours per night.  Given that she would like to be on an earlier schedule, like her , we discussed strategies to help advance her circadian rhythms.  We discussed getting 30 minutes of bright light exposure in the morning, either with the sun or a SAD Lamp of 10,000 lux intensity. Avoid bright light, including electronics, in the hour before bedtime. Take 1 mg melatonin 3-5 hours prior to natural sleep onset. Keep a consistent sleep schedule, avoiding naps and sleeping in.         Comorbid Diagnoses:  Patient Active Problem List   Diagnosis    Hypercholesterolemia    Hyperparathyroidism (H24)    Hypertension goal BP (blood pressure) < 140/90    Anxiety    Endometritis    NAFLD (nonalcoholic fatty liver disease)    Class 2 severe obesity due to excess calories with serious comorbidity in adult (H)    ROXANN (obstructive sleep apnea)        Summary Counseling:    Sleep Testing Reviewed  Obstructive Sleep Apnea Reviewed  Complications of Untreated Sleep Apnea Reviewed      Patient will follow up in 2 years.  Bennett Goltz, PA-C      Total time spent reviewing medical records, history and physical examination, review of previous testing and interpretation as well as documentation on this date:60 min    CC: Kae Gomez          History of Present Illness:     Modesta Bravo presents to establish care for previously diagnosed ROXANN.   She is on auto CPAP 5-15 cm. She has been using CPAP regularly and feels better with it. She uses an AirFit F20. She has not replaced parts for a while because her prescription . She has been using [facility] but would like to transfer care. She does not snore with CPAP. She does have mask leak at the nasal bridge. She gets mild dry mouth. She does have some nasal congestion, regardless of CPAP. She  does change the filter monthly. She sporadically uses water in the humidifier. She uses a mouth guard and drools a lot. She washes the mask daily. She has tried nasal masks but it did not work. She has always been a mouth breather. She is very comfortable with the pressures. She uses a CPAP sterilizer on the replaceable parts.  She has gained 9 pounds since her sleep study.      She is a restless sleeper and constantly tossing and turning. She has difficulty falling asleep. Once she falls asleep, she thinks she sleeps ok. Sometimes she has difficulty getting back to sleep when she gets up for the restroom. She does feel rested when she wakes. She is trying to be in bed when her  goes to bed, which is earlier than her preferred schedule. She would like to be on his schedule. She also notes sensitivity to caffeine and she does not always stop it early enough.      She had a palate expansion procedure a few years ago and felt that helped her breathing.    The compliance data shows that the patient used the CPAP for 30/30 nights, 100% of nights for >4 hours.  The 95th% pressure is 13.3 cm.  The 95th% leak is 10 lpm.  The average nightly usage is 9:19.  The average AHI is 2.3/hr.     Past Sleep Evaluations: HST with [facility] on 11/17/2020. AHI 31.9/hr (central 2.3/hr), RDI 33.1/hr. O2 rona 81%. 2.7 min below 89% SpO2. 214#    SLEEP-WAKE SCHEDULE:     Work/School Days: Patient goes to school/work: No   Usually gets into bed at 9;30-10;00  Takes patient about at least 1hour to fall asleep  Has trouble falling asleep 4 or more nights per week  Wakes up in the middle of the night twice times.  Wakes up due to Use the bathroom  She has trouble falling back asleep several times a week.   It usually takes 10-15 minutes to get back to sleep  Patient is usually up at 7-8:30  Uses alarm: No    Weekends/Non-work Days/All Other Days:  Usually gets into bed at 11   Takes patient about 30 minutes to fall asleep  Patient is usually  up at 8:00  Uses alarm: No    Sleep Need  Patient gets  8 sleep on average   Patient thinks she needs about 8 sleep    Modesta Bravo prefers to sleep in this position(s): Side   Patient states they do the following activities in bed: Watch TV    Naps  Patient takes a purposeful nap hardly ever times a week and naps are usually   in duration  She feels better after a nap:    She dozes off unintentionally hardly ever days per week  Patient has had a driving accident or near-miss due to sleepiness/drowsiness: No      SLEEP DISRUPTIONS:    Breathing/Snoring  Patient snores:No  Other people complain about her snoring: No  Patient has been told she stops breathing in her sleep:No  She has issues with the following: Stuffy nose when you wake up;Getting up to urinate more than once. Morning headaches: no. Nocturnal heartburn or reflux: no. Nasal congestion at night: yes, in the morning.    Movement:  Patient gets pain, discomfort, with an urge to move:  No restless legs symptoms. Ferritin was 44 in 10/2021  It happens when she is resting:  No  It happens more at night:  Yes  Patient has been told she kicks her legs at night:  Yes tosses and turns a lot, likely related to being in bed too long and at a time that she is not very tired     Behaviors in Sleep:  Modesta Bravo has experienced the following behaviors while sleeping: Teeth grinding-uses a bite guard  Pt denies sleep talking, sleep walking, and dream enactment behavior. Pt denies sleep paralysis, hypnagogue and cataplexy.       Is there anything else you would like your sleep provider to know:        CAFFEINE AND OTHER SUBSTANCES:    Patient consumes caffeinated beverages per day:  2-4 coffee, pop, iced tea  Last caffeine use is usually: stop by 1  List of any prescribed or over the counter stimulants that patient takes: advil pm, melatonin (Advil PM makes her groggy the next day  List of any prescribed or over the counter sleep medication patient takes:    List  of previous sleep medications that patient has tried:    Patient drinks alcohol to help them sleep:    Patient drinks alcohol near bedtime: Yes 1 glass of wine nightly.     Family History:  Patient has a family member been diagnosed with a sleep disorder: Yes  both brothers     Social History:  She is retired since having kids. She worked as a . She was doing some standardized testing. She lives with her .       SCALES:    EPWORTH SLEEPINESS SCALE         9/25/2024    12:14 PM    Mackay Sleepiness Scale ( FORREST Temple  [id number]<br>ESS - [place]/English - Final version - 21 Nov 07 - [facility] [place].)   Sitting and reading Slight chance of dozing   Watching TV Slight chance of dozing   Sitting, inactive in a public place (e.g. a theatre or a meeting) Would never doze   As a passenger in a car for an hour without a break Slight chance of dozing   Lying down to rest in the afternoon when circumstances permit Slight chance of dozing   Sitting and talking to someone Would never doze   Sitting quietly after a lunch without alcohol Would never doze   In a car, while stopped for a few minutes in traffic Would never doze   Mackay Score (MC) 4   Mackay Score (Sleep) 4         INSOMNIA SEVERITY INDEX (STEVE)          9/25/2024    11:58 AM   Insomnia Severity Index (STEVE)   Difficulty falling asleep 2   Difficulty staying asleep 1   Problems waking up too early 1   How SATISFIED/DISSATISFIED are you with your CURRENT sleep pattern? 2   How NOTICEABLE to others do you think your sleep problem is in terms of impairing the quality of your life? 1   How WORRIED/DISTRESSED are you about your current sleep problem? 0   To what extent do you consider your sleep problem to INTERFERE with your daily functioning (e.g. daytime fatigue, mood, ability to function at work/daily chores, concentration, memory, mood, etc.) CURRENTLY? 1   STEVE Total Score 8       Guidelines for Scoring/Interpretation:  Total score  "categories:  0-7 = No clinically significant insomnia   8-14 = Subthreshold insomnia   15-21 = Clinical insomnia (moderate severity)  22-28 = Clinical insomnia (severe)  Used via courtesy of www.myhealth.va.gov with permission from Ezio Bautista PhD., [facility]      STOP BANG         9/26/2024     9:59 AM   STOP BANG Questionnaire (  2008, the American Society of Anesthesiologists, Inc. [employer]   Neck Cir (cm) Clinic: 43 cm   B/P Clinic: 138/84   BMI Clinic: 35.45         GAD7        9/18/2024     1:31 PM   SARAI-7    1. Feeling nervous, anxious, or on edge 1   2. Not being able to stop or control worrying 1   3. Worrying too much about different things 1   4. Trouble relaxing 1   5. Being so restless that it is hard to sit still 0   6. Becoming easily annoyed or irritable 1   7. Feeling afraid, as if something awful might happen 0   SARAI-7 Total Score 5   If you checked any problems, how difficult have they made it for you to do your work, take care of things at home, or get along with other people? Not difficult at all         CAGE-AID         No data to display                CAGE-AID reprinted with permission from the [place] Medical Journal, CASSY Cristobal. and RAMANDEEP Nick, \"Conjoint screening questionnaires for alcohol and drug abuse\" [place] Medical Journal 94: 135-140, 1995.      PATIENT HEALTH QUESTIONNAIRE-9 (PHQ - 9)        6/19/2024     2:43 PM   PHQ-9 ([employer])   1.  Little interest or pleasure in doing things 1   2.  Feeling down, depressed, or hopeless 0   3.  Trouble falling or staying asleep, or sleeping too much 1   4.  Feeling tired or having little energy 1   5.  Poor appetite or overeating 0   6.  Feeling bad about yourself - or that you are a failure or have let yourself or your family down 2   7.  Trouble concentrating on things, such as reading the newspaper or watching television 0   8.  Moving or speaking so slowly that other people could have noticed. Or " the opposite - being so fidgety or restless that you have been moving around a lot more than usual 0   9.  Thoughts that you would be better off dead, or of hurting yourself in some way 0   PHQ-9 Total Score 5   If you checked off any problems, how difficult have these problems made it for you to do your work, take care of things at home, or get along with other people? Somewhat difficult   6.  Feeling bad about yourself 2   7.  Trouble concentrating 0   8.  Moving slowly or restless 0   9.  Suicidal or self-harm thoughts 0   Difficulty at work, home, or with people Somewhat difficult       Developed by Jas Almaguer, Constance Faith, Shahab Perez and colleagues, with an educational jacky from [employer]. No permission required to reproduce, translate, display or distribute.        Allergies:    Allergies   Allergen Reactions    Amlodipine Swelling     Leg swelling    Hydrochlorothiazide Unknown     hypercalcemia       Medications:    Current Outpatient Medications   Medication Sig Dispense Refill    atorvastatin (LIPITOR) 20 MG tablet TAKE 1 TABLET(20 MG) BY MOUTH DAILY 90 tablet 4    buPROPion (WELLBUTRIN XL) 150 MG 24 hr tablet Take 1 tablet (150 mg) by mouth every morning. 30 tablet 3    estradiol (ESTRACE) 0.1 MG/GM vaginal cream Place 2 g vaginally twice a week. Start with nightly for 2 weeks, then twice weekly. 42.5 g 11    FLUoxetine (PROZAC) 20 MG capsule Take 1 capsule (20 mg) by mouth daily. 90 capsule 3    lisinopril (ZESTRIL) 20 MG tablet Take 1 tablet (20 mg) by mouth daily. 90 tablet 4    loratadine (CLARITIN) 10 MG tablet Take 10 mg by mouth daily prn      omeprazole (PRILOSEC) 40 MG DR capsule Take 1 capsule (40 mg) by mouth daily 90 capsule 3    UNABLE TO FIND MEDICATION NAME: Liver cleanse & detox      vitamin D3 (CHOLECALCIFEROL) 50 mcg (2000 units) tablet Take 1 tablet by mouth daily      Bacillus Coagulans-Inulin (ALIGN PREBIOTIC-PROBIOTIC) 5-1.25 MG-GM CHEW Take 2 tablets by mouth  At Bedtime (Patient not taking: Reported on 1/19/2024)         Problem List:  Patient Active Problem List    Diagnosis Date Noted    Class 2 severe obesity due to excess calories with serious comorbidity in adult (H) 09/23/2024     Priority: Medium    ROXANN (obstructive sleep apnea) 09/23/2024     Priority: Medium    NAFLD (nonalcoholic fatty liver disease) 06/19/2024     Priority: Medium    Monoallelic mutation of CHEK2 gene in female patient 01/26/2024     Priority: Medium     MODERATE RISK eris rosales p.I157T (c.470T>C)      Family history of breast cancer in sister 08/09/2023     Priority: Medium    Obesity, unspecified classification, unspecified obesity type, unspecified whether serious comorbidity present 01/28/2020     Priority: Medium    Endometritis 07/26/2017     Priority: Medium    Cough 06/21/2016     Priority: Medium     Suspect cough variant asthma and UACS/post nasal drip. ICS, saline nasal rinse and flonase.      Anxiety 03/15/2016     Priority: Medium    Hypertension goal BP (blood pressure) < 140/90 01/29/2016     Priority: Medium    Hypercholesterolemia      Priority: Medium    Hyperparathyroidism (H24)      Priority: Medium        Past Medical/Surgical History:  Past Medical History:   Diagnosis Date    Anxiety     Cough variant asthma     Family history of breast cancer     Pt is BrCa negative    Hyperlipidaemia     Hyperparathyroidism (H24)     seen by Dr. Bosch    Hypertension     Menorrhagia     Vitamin deficiency      Past Surgical History:   Procedure Laterality Date    BIOPSY      BREAST SURGERY      COLONOSCOPY      GYN SURGERY      NO HISTORY OF SURGERY         Social History:  Social History     Socioeconomic History    Marital status:      Spouse name: Not on file    Number of children: 2    Years of education: Not on file    Highest education level: Not on file   Occupational History    Not on file   Tobacco Use    Smoking status: Never    Smokeless tobacco: Never   Vaping Use     Vaping status: Never Used   Substance and Sexual Activity    Alcohol use: Yes     Comment: daily 1 glass wine    Drug use: No    Sexual activity: Yes     Partners: Male     Birth control/protection: Male Surgical     Comment: vasectomy   Other Topics Concern    Parent/sibling w/ CABG, MI or angioplasty before 65F 55M? No   Social History Narrative    Has a Paramjit Serna named Leodan. 2 children. 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 Own a cabin near [place], [place].     Social Determinants of Health     Financial Resource Strain: Low Risk  (9/18/2024)    Financial Resource Strain     Within the past 12 months, have you or your family members you live with been unable to get utilities (heat, electricity) when it was really needed?: No   Food Insecurity: Low Risk  (9/18/2024)    Food Insecurity     Within the past 12 months, did you worry that your food would run out before you got money to buy more?: No     Within the past 12 months, did the food you bought just not last and you didn t have money to get more?: No   Transportation Needs: Low Risk  (9/18/2024)    Transportation Needs     Within the past 12 months, has lack of transportation kept you from medical appointments, getting your medicines, non-medical meetings or appointments, work, or from getting things that you need?: No   Physical Activity: Insufficiently Active (9/18/2024)    Exercise Vital Sign     Days of Exercise per Week: 2 days     Minutes of Exercise per Session: 30 min   Stress: No Stress Concern Present (9/18/2024)    [employer] [employer] - Occupational Stress Questionnaire     Feeling of Stress : Only a little   Social Connections: Unknown (9/18/2024)    Social Connection and Isolation Panel [NHANES]     Frequency of Communication with Friends and Family: Not on file     Frequency of Social Gatherings with Friends and Family: More than three times a week     Attends Gnosticism Services: Not on file     Active Member of  Clubs or Organizations: Not on file     Attends Club or Organization Meetings: Not on file     Marital Status: Not on file   Interpersonal Safety: Low Risk  (9/23/2024)    Interpersonal Safety     Do you feel physically and emotionally safe where you currently live?: Yes     Within the past 12 months, have you been hit, slapped, kicked or otherwise physically hurt by someone?: No     Within the past 12 months, have you been humiliated or emotionally abused in other ways by your partner or ex-partner?: No   Housing Stability: High Risk (9/18/2024)    Housing Stability     Do you have housing? : No     Are you worried about losing your housing?: No       Family History:  Family History   Problem Relation Age of Onset    Hyperlipidemia Mother     Hypertension Mother     Diabetes Mother     Depression Mother     Hyperlipidemia Father     Diabetes Father     Hypertension Father         chf 73    Breast Cancer Maternal Grandmother     Breast Cancer Sister 47        pt is BRCA neg    Breast Cancer Sister     Diabetes Brother     Anxiety Disorder Son        Review of Systems:  A complete review of systems reviewed by me is negative with the exeption of what has been mentioned in the history of present illness.  In the last TWO WEEKS have you experienced any of the following symptoms?  Fevers: No  Night Sweats: No  Weight Gain: No  Pain at Night: No  Double Vision: No  Changes in Vision: No  Difficulty Breathing through Nose: No  Sore Throat in Morning: Yes  Dry Mouth in the Morning: Yes  Shortness of Breath Lying Flat: No  Shortness of Breath With Activity: Yes  Awakening with Shortness of Breath: No  Increased Cough: No  Heart Racing at Night: Yes  Swelling in Feet or Legs: No  Diarrhea at Night: No  Heartburn at Night: No  Urinating More than Once at Night: Yes  Losing Control of Urine at Night: No  Joint Pains at Night: Yes  Headaches in Morning: No  Weakness in Arms or Legs: No  Depressed Mood: No  Anxiety: No  "    Physical Examination:  Vitals: /84   Pulse 92   Ht 1.689 m (5' 6.5\")   Wt 101.2 kg (223 lb)   LMP  (LMP Unknown)   SpO2 95%   BMI 35.45 kg/m    BMI= Body mass index is 35.45 kg/m .    Neck Cir (cm): 43 cm      GENERAL APPEARANCE: healthy, alert, no distress, and cooperative  EYES: Eyes grossly normal to inspection, PERRL, conjunctivae and sclerae normal, and lids and lashes normal  HENT: oropharynx crowded/shallow and tongue base enlarged  NECK: no adenopathy, no asymmetry, masses, or scars, thyroid normal to palpation, and trachea midline and normal to palpation  RESP: lungs clear to auscultation - no rales, rhonchi or wheezes  CV: regular rates and rhythm, no murmur, click or rub, and no irregular beats  LYMPHATICS: no cervical adenopathy  MS: extremities normal- no gross deformities noted  NEURO: Normal strength and tone, mentation intact, and speech normal  Mallampati Class: III.  Tonsillar Stage: 1  hidden by pillars.         Data: All pertinent previous laboratory data reviewed     Recent Labs   Lab Test 09/23/24 0919 08/09/23  1019    140   POTASSIUM 4.9 5.1   CHLORIDE 100 102   CO2 26 27   ANIONGAP 12 11   * 102*   BUN 15.0 15.2   CR 0.74 0.75   AMARJIT 10.6* 10.7*       Recent Labs   Lab Test 09/23/24 0919   WBC 6.7   RBC 4.33   HGB 13.8   HCT 43.2      MCH 31.9   MCHC 31.9   RDW 13.1          Recent Labs   Lab Test 09/23/24 0919   PROTTOTAL 8.3   ALBUMIN 4.6   BILITOTAL 0.7   ALKPHOS 93   AST 82*   *       TSH (mU/L)   Date Value   04/06/2018 2.39   11/11/2016 2.63       No results found for: \"UAMP\", \"UBARB\", \"BENZODIAZEUR\", \"UCANN\", \"UCOC\", \"OPIT\", \"UPCP\"    No results found for: \"IRONSAT\", \"IT26346\", \"PAULINA\"    No results found for: \"PH\", \"PHARTERIAL\", \"PO2\", \"XR6MFUEQPMK\", \"SAT\", \"PCO2\", \"HCO3\", \"BASEEXCESS\", \"KIRILL\", \"BEB\"    @LABRCNTIPR(phv:4,pco2v:4,po2v:4,hco3v:4,marion:4,o2per:4)@    Echocardiology: No results found for this or any previous visit (from the " "past 4320 hour(s)).    Chest x-ray:   XR CHEST B READ 2 VIEWS 10/30/2023    Narrative  For Patients: As a result of the 21st Century Cures Act, medical imaging exams and procedure reports are released immediately into your electronic medical record. You may view this report before your referring provider. If you have questions, please contact your health care provider.    EXAM: XR CHEST 2 VIEWS PA AND LATERAL  LOCATION: [facility]  DATE: 10/30/2023    INDICATION: Cough  COMPARISON: 01/04/2016    Impression  No evidence for CHF or pneumonia. No pleural effusion or pneumothorax. Normal heart size. Old right eighth rib fracture suggested posterolaterally.      Chest CT: No results found for this or any previous visit from the past 365 days.      PFT: Most Recent [facility] Pulmonary Function Testing    FVC-Pred   Date Value Ref Range Status   02/09/2016 3.89 L      FVC-Pre   Date Value Ref Range Status   02/09/2016 3.40 L      FVC-%Pred-Pre   Date Value Ref Range Status   02/09/2016 87 %      FEV1-Pre   Date Value Ref Range Status   02/09/2016 2.38 L      FEV1-%Pred-Pre   Date Value Ref Range Status   02/09/2016 76 %      FEV1FVC-Pred   Date Value Ref Range Status   02/09/2016 80 %      FEV1FVC-Pre   Date Value Ref Range Status   02/09/2016 70 %      No results found for: \"20029\"  FEFMax-Pred   Date Value Ref Range Status   02/09/2016 7.28 L/sec      FEFMax-Pre   Date Value Ref Range Status   02/09/2016 6.32 L/sec      FEFMax-%Pred-Pre   Date Value Ref Range Status   02/09/2016 86 %      ExpTime-Pre   Date Value Ref Range Status   02/09/2016 11.98 sec      FIFMax-Pre   Date Value Ref Range Status   02/09/2016 6.41 L/sec      FEV1FEV6-Pred   Date Value Ref Range Status   02/09/2016 82 %      FEV1FEV6-Pre   Date Value Ref Range Status   02/09/2016 74 %      No results found for: \"20055\"      Bennett Ezra Goltz, PA-C, PHI 9/26/2024          "

## 2024-10-14 DIAGNOSIS — K21.9 GASTROESOPHAGEAL REFLUX DISEASE WITHOUT ESOPHAGITIS: ICD-10-CM

## 2024-10-14 RX ORDER — OMEPRAZOLE 40 MG/1
40 CAPSULE, DELAYED RELEASE ORAL DAILY
Qty: 90 CAPSULE | Refills: 2 | Status: SHIPPED | OUTPATIENT
Start: 2024-10-14

## 2024-11-08 ENCOUNTER — TRANSFERRED RECORDS (OUTPATIENT)
Dept: HEALTH INFORMATION MANAGEMENT | Facility: CLINIC | Age: 59
End: 2024-11-08
Payer: COMMERCIAL

## 2025-01-13 DIAGNOSIS — E66.812 CLASS 2 SEVERE OBESITY DUE TO EXCESS CALORIES WITH SERIOUS COMORBIDITY AND BODY MASS INDEX (BMI) OF 35.0 TO 35.9 IN ADULT (H): ICD-10-CM

## 2025-01-13 DIAGNOSIS — E66.01 CLASS 2 SEVERE OBESITY DUE TO EXCESS CALORIES WITH SERIOUS COMORBIDITY AND BODY MASS INDEX (BMI) OF 35.0 TO 35.9 IN ADULT (H): ICD-10-CM

## 2025-01-13 DIAGNOSIS — F41.1 GAD (GENERALIZED ANXIETY DISORDER): ICD-10-CM

## 2025-01-13 RX ORDER — BUPROPION HYDROCHLORIDE 150 MG/1
150 TABLET ORAL EVERY MORNING
Qty: 30 TABLET | Refills: 2 | Status: SHIPPED | OUTPATIENT
Start: 2025-01-13

## 2025-05-01 DIAGNOSIS — E66.812 CLASS 2 SEVERE OBESITY DUE TO EXCESS CALORIES WITH SERIOUS COMORBIDITY AND BODY MASS INDEX (BMI) OF 35.0 TO 35.9 IN ADULT (H): ICD-10-CM

## 2025-05-01 DIAGNOSIS — F41.1 GAD (GENERALIZED ANXIETY DISORDER): ICD-10-CM

## 2025-05-01 DIAGNOSIS — E66.01 CLASS 2 SEVERE OBESITY DUE TO EXCESS CALORIES WITH SERIOUS COMORBIDITY AND BODY MASS INDEX (BMI) OF 35.0 TO 35.9 IN ADULT (H): ICD-10-CM

## 2025-05-01 RX ORDER — BUPROPION HYDROCHLORIDE 150 MG/1
150 TABLET ORAL EVERY MORNING
Qty: 90 TABLET | Refills: 0 | Status: SHIPPED | OUTPATIENT
Start: 2025-05-01

## 2025-07-15 ENCOUNTER — PATIENT OUTREACH (OUTPATIENT)
Dept: CARE COORDINATION | Facility: CLINIC | Age: 60
End: 2025-07-15
Payer: COMMERCIAL

## 2025-07-23 ENCOUNTER — TELEPHONE (OUTPATIENT)
Dept: SLEEP MEDICINE | Facility: CLINIC | Age: 60
End: 2025-07-23
Payer: COMMERCIAL

## 2025-07-23 DIAGNOSIS — G47.33 OSA (OBSTRUCTIVE SLEEP APNEA): Primary | ICD-10-CM

## 2025-07-23 NOTE — TELEPHONE ENCOUNTER
Medication Question or Refill    Contacts       Contact Date/Time Type Contact Phone/Fax    07/23/2025 02:01 PM CDT Phone (Incoming) Modesta Bravo (Self) [phone] (M)            What medication are you calling about (include dose and sig)?: cpap supplies    Preferred Pharmacy:         Controlled Substance Agreement on file:   CSA -- Patient Level:    CSA: None found at the patient level.       Who prescribed the medication?: Ry Sue    Do you need a refill? Yes    When did you use the medication last? N/A    Patient offered an appointment? No    Do you have any questions or concerns?  Yes: Send to [facility]       Could we send this information to you in [facility] or would you prefer to receive a phone call?:   Patient would prefer a phone call   Okay to leave a detailed message?: Yes at Cell number on file:    Telephone Information:   Mobile [phone]

## 2025-07-25 NOTE — TELEPHONE ENCOUNTER
Last ov 9/26/24  Next ov follow up in 2 years    Patient requesting updated order for CPAP supplies prior to appointment. Order pended and routed to provider for consideration.    EMMA SUGGS RN  [facility]

## 2025-08-08 DIAGNOSIS — E66.812 CLASS 2 SEVERE OBESITY DUE TO EXCESS CALORIES WITH SERIOUS COMORBIDITY AND BODY MASS INDEX (BMI) OF 35.0 TO 35.9 IN ADULT (H): ICD-10-CM

## 2025-08-08 DIAGNOSIS — E66.01 CLASS 2 SEVERE OBESITY DUE TO EXCESS CALORIES WITH SERIOUS COMORBIDITY AND BODY MASS INDEX (BMI) OF 35.0 TO 35.9 IN ADULT (H): ICD-10-CM

## 2025-08-08 DIAGNOSIS — F41.1 GAD (GENERALIZED ANXIETY DISORDER): ICD-10-CM

## 2025-08-11 RX ORDER — BUPROPION HYDROCHLORIDE 150 MG/1
150 TABLET ORAL EVERY MORNING
Qty: 90 TABLET | Refills: 1 | Status: SHIPPED | OUTPATIENT
Start: 2025-08-11